# Patient Record
Sex: MALE | Race: WHITE | Employment: PART TIME | URBAN - METROPOLITAN AREA
[De-identification: names, ages, dates, MRNs, and addresses within clinical notes are randomized per-mention and may not be internally consistent; named-entity substitution may affect disease eponyms.]

---

## 2017-06-01 ENCOUNTER — OFFICE VISIT (OUTPATIENT)
Dept: INTERNAL MEDICINE CLINIC | Age: 62
End: 2017-06-01

## 2017-06-01 VITALS
TEMPERATURE: 98 F | BODY MASS INDEX: 28.63 KG/M2 | HEIGHT: 70 IN | WEIGHT: 200 LBS | SYSTOLIC BLOOD PRESSURE: 110 MMHG | DIASTOLIC BLOOD PRESSURE: 70 MMHG

## 2017-06-01 DIAGNOSIS — L98.9 SKIN ABNORMALITY: Primary | ICD-10-CM

## 2017-06-01 PROCEDURE — 99213 OFFICE O/P EST LOW 20 MIN: CPT | Performed by: NURSE PRACTITIONER

## 2017-06-01 RX ORDER — PREDNISONE 20 MG/1
20 TABLET ORAL DAILY
COMMUNITY
End: 2017-07-20

## 2017-06-01 RX ORDER — DOXYCYCLINE HYCLATE 100 MG
100 TABLET ORAL 2 TIMES DAILY
COMMUNITY
End: 2017-06-22

## 2017-06-01 ASSESSMENT — ENCOUNTER SYMPTOMS
EYES NEGATIVE: 1
CONSTIPATION: 0
BACK PAIN: 0
DIARRHEA: 0
COLOR CHANGE: 0
SHORTNESS OF BREATH: 0
BLOOD IN STOOL: 0
COUGH: 0

## 2017-06-05 ENCOUNTER — TELEPHONE (OUTPATIENT)
Dept: INTERNAL MEDICINE CLINIC | Age: 62
End: 2017-06-05

## 2017-06-21 ENCOUNTER — TELEPHONE (OUTPATIENT)
Dept: INTERNAL MEDICINE CLINIC | Age: 62
End: 2017-06-21

## 2017-06-21 ENCOUNTER — OFFICE VISIT (OUTPATIENT)
Dept: INTERNAL MEDICINE CLINIC | Age: 62
End: 2017-06-21

## 2017-06-21 ENCOUNTER — HOSPITAL ENCOUNTER (OUTPATIENT)
Dept: GENERAL RADIOLOGY | Age: 62
Discharge: HOME | End: 2017-06-21
Attending: INTERNAL MEDICINE | Admitting: INTERNAL MEDICINE

## 2017-06-21 VITALS
DIASTOLIC BLOOD PRESSURE: 86 MMHG | OXYGEN SATURATION: 96 % | BODY MASS INDEX: 28.12 KG/M2 | WEIGHT: 196 LBS | SYSTOLIC BLOOD PRESSURE: 136 MMHG | HEART RATE: 94 BPM

## 2017-06-21 DIAGNOSIS — E78.5 HYPERLIPIDEMIA, UNSPECIFIED HYPERLIPIDEMIA TYPE: ICD-10-CM

## 2017-06-21 DIAGNOSIS — R21 RASH: Primary | ICD-10-CM

## 2017-06-21 DIAGNOSIS — R21 SKIN RASH: ICD-10-CM

## 2017-06-21 DIAGNOSIS — Z13.9 SCREENING: ICD-10-CM

## 2017-06-21 DIAGNOSIS — H02.402 PTOSIS OF EYELID, LEFT: Primary | ICD-10-CM

## 2017-06-21 LAB
A/G RATIO: 1.4 (ref 1.1–2.2)
ALBUMIN SERPL-MCNC: 4.2 G/DL (ref 3.4–5)
ALP BLD-CCNC: 61 U/L (ref 40–129)
ALT SERPL-CCNC: 26 U/L (ref 10–40)
ANION GAP SERPL CALCULATED.3IONS-SCNC: 16 MMOL/L (ref 3–16)
AST SERPL-CCNC: 23 U/L (ref 15–37)
BASOPHILS ABSOLUTE: 0 K/UL (ref 0–0.2)
BASOPHILS RELATIVE PERCENT: 0.4 %
BILIRUB SERPL-MCNC: 0.5 MG/DL (ref 0–1)
BUN BLDV-MCNC: 14 MG/DL (ref 7–20)
CALCIUM SERPL-MCNC: 9.6 MG/DL (ref 8.3–10.6)
CHLORIDE BLD-SCNC: 100 MMOL/L (ref 99–110)
CHOLESTEROL, TOTAL: 212 MG/DL (ref 0–199)
CO2: 23 MMOL/L (ref 21–32)
CREAT SERPL-MCNC: 0.8 MG/DL (ref 0.8–1.3)
EOSINOPHILS ABSOLUTE: 0.1 K/UL (ref 0–0.6)
EOSINOPHILS RELATIVE PERCENT: 1.1 %
GFR AFRICAN AMERICAN: >60
GFR NON-AFRICAN AMERICAN: >60
GLOBULIN: 2.9 G/DL
GLUCOSE BLD-MCNC: 117 MG/DL (ref 70–99)
HCT VFR BLD CALC: 46.1 % (ref 40.5–52.5)
HDLC SERPL-MCNC: 59 MG/DL (ref 40–60)
HEMOGLOBIN: 15.1 G/DL (ref 13.5–17.5)
LDL CHOLESTEROL CALCULATED: 123 MG/DL
LYMPHOCYTES ABSOLUTE: 2.5 K/UL (ref 1–5.1)
LYMPHOCYTES RELATIVE PERCENT: 25.9 %
MCH RBC QN AUTO: 28.8 PG (ref 26–34)
MCHC RBC AUTO-ENTMCNC: 32.7 G/DL (ref 31–36)
MCV RBC AUTO: 87.9 FL (ref 80–100)
MONOCYTES ABSOLUTE: 0.6 K/UL (ref 0–1.3)
MONOCYTES RELATIVE PERCENT: 6.2 %
NEUTROPHILS ABSOLUTE: 6.3 K/UL (ref 1.7–7.7)
NEUTROPHILS RELATIVE PERCENT: 66.4 %
PDW BLD-RTO: 13.6 % (ref 12.4–15.4)
PLATELET # BLD: 239 K/UL (ref 135–450)
PMV BLD AUTO: 9.3 FL (ref 5–10.5)
POTASSIUM SERPL-SCNC: 4.4 MMOL/L (ref 3.5–5.1)
PROSTATE SPECIFIC ANTIGEN: 3.02 NG/ML (ref 0–4)
RBC # BLD: 5.24 M/UL (ref 4.2–5.9)
SEDIMENTATION RATE, ERYTHROCYTE: 12 MM/HR (ref 0–20)
SODIUM BLD-SCNC: 139 MMOL/L (ref 136–145)
TOTAL PROTEIN: 7.1 G/DL (ref 6.4–8.2)
TRIGL SERPL-MCNC: 151 MG/DL (ref 0–150)
VLDLC SERPL CALC-MCNC: 30 MG/DL
WBC # BLD: 9.5 K/UL (ref 4–11)

## 2017-06-21 PROCEDURE — 99215 OFFICE O/P EST HI 40 MIN: CPT | Performed by: INTERNAL MEDICINE

## 2017-06-21 RX ORDER — VALACYCLOVIR HYDROCHLORIDE 1 G/1
1000 TABLET, FILM COATED ORAL 3 TIMES DAILY
Qty: 21 TABLET | Refills: 0 | Status: SHIPPED | OUTPATIENT
Start: 2017-06-21 | End: 2017-07-20

## 2017-06-22 ENCOUNTER — INITIAL CONSULT (OUTPATIENT)
Dept: NEUROLOGY | Age: 62
End: 2017-06-22

## 2017-06-22 VITALS
DIASTOLIC BLOOD PRESSURE: 73 MMHG | BODY MASS INDEX: 28.49 KG/M2 | HEIGHT: 70 IN | HEART RATE: 71 BPM | OXYGEN SATURATION: 97 % | WEIGHT: 199 LBS | SYSTOLIC BLOOD PRESSURE: 117 MMHG

## 2017-06-22 DIAGNOSIS — G70.00 MG (MYASTHENIA GRAVIS) (HCC): ICD-10-CM

## 2017-06-22 DIAGNOSIS — H02.402 PTOSIS, LEFT: Primary | ICD-10-CM

## 2017-06-22 DIAGNOSIS — I77.74 DISSECTION, VERTEBRAL ARTERY (HCC): ICD-10-CM

## 2017-06-22 DIAGNOSIS — E78.5 DYSLIPIDEMIA: ICD-10-CM

## 2017-06-22 PROBLEM — H02.409 PTOSIS: Status: ACTIVE | Noted: 2017-06-22

## 2017-06-22 PROCEDURE — 99244 OFF/OP CNSLTJ NEW/EST MOD 40: CPT | Performed by: PSYCHIATRY & NEUROLOGY

## 2017-06-23 ENCOUNTER — HOSPITAL ENCOUNTER (OUTPATIENT)
Dept: GENERAL RADIOLOGY | Age: 62
Discharge: HOME | End: 2017-06-23
Attending: PSYCHIATRY & NEUROLOGY | Admitting: PSYCHIATRY & NEUROLOGY

## 2017-06-23 LAB
FOLATE: >20 NG/ML (ref 4.78–24.2)
LYME, EIA: 0.36 LIV (ref 0–1.2)
VITAMIN B-12: 514 PG/ML (ref 211–911)

## 2017-06-26 LAB
ANA INTERPRETATION: ABNORMAL
ANA TITER: ABNORMAL
ANTI-NUCLEAR ANTIBODY (ANA): POSITIVE

## 2017-06-27 LAB
ACETYLCHOLINE BINDING ANTIBODY: 0 NMOL/L (ref 0–0.4)
ACETYLCHOLINE BLOCKING AB: 14 % (ref 0–26)

## 2017-06-29 ENCOUNTER — HOSPITAL ENCOUNTER (OUTPATIENT)
Dept: CT IMAGING | Age: 62
Discharge: HOME | End: 2017-06-29
Attending: PSYCHIATRY & NEUROLOGY | Admitting: PSYCHIATRY & NEUROLOGY

## 2017-06-29 DIAGNOSIS — I77.74 DISSECTION, VERTEBRAL ARTERY (HCC): ICD-10-CM

## 2017-06-29 DIAGNOSIS — I77.74 DISSECTION OF VERTEBRAL ARTERY (HCC): ICD-10-CM

## 2017-07-06 ENCOUNTER — OFFICE VISIT (OUTPATIENT)
Dept: INTERNAL MEDICINE CLINIC | Age: 62
End: 2017-07-06

## 2017-07-06 VITALS
WEIGHT: 200 LBS | HEART RATE: 74 BPM | SYSTOLIC BLOOD PRESSURE: 128 MMHG | OXYGEN SATURATION: 98 % | DIASTOLIC BLOOD PRESSURE: 82 MMHG | BODY MASS INDEX: 28.63 KG/M2 | HEIGHT: 70 IN

## 2017-07-06 DIAGNOSIS — R21 RASH: ICD-10-CM

## 2017-07-06 DIAGNOSIS — H02.402 PTOSIS, LEFT: Primary | ICD-10-CM

## 2017-07-06 DIAGNOSIS — E78.5 HYPERLIPIDEMIA, UNSPECIFIED HYPERLIPIDEMIA TYPE: ICD-10-CM

## 2017-07-06 DIAGNOSIS — Z82.49 FAMILY HISTORY OF EARLY CAD: ICD-10-CM

## 2017-07-06 PROCEDURE — 99214 OFFICE O/P EST MOD 30 MIN: CPT | Performed by: INTERNAL MEDICINE

## 2017-07-06 RX ORDER — TRIAMCINOLONE ACETONIDE 1 MG/G
CREAM TOPICAL 2 TIMES DAILY
COMMUNITY
End: 2017-07-20

## 2017-07-20 ENCOUNTER — OFFICE VISIT (OUTPATIENT)
Dept: NEUROLOGY | Age: 62
End: 2017-07-20

## 2017-07-20 VITALS
HEIGHT: 70 IN | SYSTOLIC BLOOD PRESSURE: 111 MMHG | WEIGHT: 200 LBS | BODY MASS INDEX: 28.63 KG/M2 | OXYGEN SATURATION: 98 % | DIASTOLIC BLOOD PRESSURE: 75 MMHG | HEART RATE: 68 BPM

## 2017-07-20 DIAGNOSIS — R21 RASH: ICD-10-CM

## 2017-07-20 DIAGNOSIS — H02.402 PTOSIS, LEFT: Primary | ICD-10-CM

## 2017-07-20 DIAGNOSIS — E78.5 DYSLIPIDEMIA: ICD-10-CM

## 2017-07-20 PROCEDURE — 99213 OFFICE O/P EST LOW 20 MIN: CPT | Performed by: PSYCHIATRY & NEUROLOGY

## 2017-07-20 RX ORDER — CLOBETASOL PROPIONATE 0.5 MG/G
AEROSOL, FOAM TOPICAL
COMMUNITY
Start: 2017-07-14

## 2017-07-21 ENCOUNTER — OFFICE VISIT (OUTPATIENT)
Dept: INTERNAL MEDICINE CLINIC | Age: 62
End: 2017-07-21

## 2017-07-21 VITALS
SYSTOLIC BLOOD PRESSURE: 118 MMHG | OXYGEN SATURATION: 98 % | BODY MASS INDEX: 28.46 KG/M2 | WEIGHT: 198.8 LBS | TEMPERATURE: 98.2 F | HEART RATE: 62 BPM | DIASTOLIC BLOOD PRESSURE: 74 MMHG | HEIGHT: 70 IN

## 2017-07-21 DIAGNOSIS — R21 RASH: Primary | ICD-10-CM

## 2017-07-21 PROCEDURE — 99213 OFFICE O/P EST LOW 20 MIN: CPT | Performed by: FAMILY MEDICINE

## 2017-07-21 RX ORDER — HYDROXYZINE HYDROCHLORIDE 25 MG/1
25 TABLET, FILM COATED ORAL 3 TIMES DAILY PRN
Qty: 30 TABLET | Refills: 0 | Status: SHIPPED | OUTPATIENT
Start: 2017-07-21 | End: 2017-07-31

## 2017-07-21 RX ORDER — PREDNISONE 10 MG/1
TABLET ORAL
Qty: 30 TABLET | Refills: 0 | Status: SHIPPED | OUTPATIENT
Start: 2017-07-21 | End: 2017-07-31

## 2017-09-26 ENCOUNTER — OFFICE VISIT (OUTPATIENT)
Dept: INTERNAL MEDICINE CLINIC | Age: 62
End: 2017-09-26

## 2017-09-26 VITALS
BODY MASS INDEX: 28.98 KG/M2 | WEIGHT: 202 LBS | DIASTOLIC BLOOD PRESSURE: 70 MMHG | HEART RATE: 68 BPM | SYSTOLIC BLOOD PRESSURE: 128 MMHG | OXYGEN SATURATION: 98 %

## 2017-09-26 DIAGNOSIS — R21 RASH: Primary | ICD-10-CM

## 2017-09-26 PROCEDURE — 99213 OFFICE O/P EST LOW 20 MIN: CPT | Performed by: FAMILY MEDICINE

## 2017-09-26 NOTE — PATIENT INSTRUCTIONS
Try taking claritin daily to help with itching, can take additional benadryl at night if needed     Try taking tylenol 500 mg tablets - take 2 tablets every 6-8 hours

## 2017-09-26 NOTE — PROGRESS NOTES
[facility]          Chief Complaint   Patient presents with    Rash       HPI:     Has persistent issues with intermittent itchy red rash all over his body, including on his scalp. It is so uncomfortable it gets painful. Has been seen several times in this office and treated with oral steroids for this over the past few months. Sx's improve with steroids, but only temporarily. Has seen derm Dr Malika Gupta or Dr Kimberly Nelson at the [facility] office, and prescribed topical steroid shampoo and cream. Using them both, but sx's are flared up again. No wheezing or SOB. No new foods or exposures. He made an appt with a Tafhernandezy Kandi Dr Hoda Pascual, but unfortunately he missed his appt and they can't get him in for 2 more months. He came to ask for help getting in sooner somewhere        Current Outpatient Prescriptions on File Prior to Visit   Medication Sig Dispense Refill    clobetasol (OLUX) 0.05 % foam       econazole nitrate 1 % cream       pravastatin (PRAVACHOL) 80 MG tablet TAKE ONE TABLET BY MOUTH EVERY EVENING FOR HIGH CHOLESTEROL 30 tablet 9    multivitamin (THERAGRAN) per tablet Take 1 tablet by mouth daily. No current facility-administered medications on file prior to visit. Review of Systems   Constitutional: Negative for activity change, diaphoresis, fatigue and fever. HENT: Negative for congestion. Eyes: Negative for redness, itching and visual disturbance. Respiratory: Negative for shortness of breath and wheezing. Gastrointestinal: Negative for diarrhea, nausea and vomiting. Skin: Positive for rash (diffuse, recurrent, see HPI). Neurological: Negative for headaches. Psychiatric/Behavioral: The patient is nervous/anxious. Physical Exam   Constitutional: He is oriented to person, place, and time. He appears well-developed and well-nourished. No distress. HENT:   Head: Normocephalic and atraumatic.    Mouth/Throat: Oropharynx is clear and moist.   Eyes:

## 2017-09-26 NOTE — MR AVS SNAPSHOT
After Visit Summary             [facility]   2017 4:20 PM   Office Visit    Description:  Male : 1955   Provider:  Jamilah Ray MD   Department:  [facility]              Your Follow-Up and Future Appointments         Below is a list of your follow-up and future appointments. This may not be a complete list as you may have made appointments directly with providers that we are not aware of or your providers may have made some for you. Please call your providers to confirm appointments. It is important to keep your appointments. Please bring your current insurance card, photo ID, co-pay, and all medication bottles to your appointment. If self-pay, payment is expected at the time of service. Information from Your Visit        Department     Name Address Phone Fax    Unbound [facility]) [address] [facility] [address] [facility]  [address] [place] [address] Street  [address] [place] [address] [address] [place] [address] [phone]      You Were Seen for:         Comments    Rash   [[id number]]         Vital Signs     Blood Pressure Pulse Weight Oxygen Saturation Body Mass Index Smoking Status    128/70 (Site: Right Arm, Position: Sitting, Cuff Size: Large Adult) 68 202 lb (91.6 kg) 98% 28.98 kg/m2 Never Smoker      Additional Information about your Body Mass Index (BMI)           Your BMI as listed above is considered overweight (25.0-29.9). BMI is an estimate of body fat, calculated from your height and weight. The higher your BMI, the greater your risk of heart disease, high blood pressure, type 2 diabetes, stroke, gallstones, arthritis, sleep apnea, and certain cancers. BMI is not perfect. It may overestimate body fat in athletes and people who are more muscular. If your body fat is high you can improve your BMI by decreasing your calorie intake and becoming more physically active.      Learn more at: [employer].co.uk          Instructions Try taking claritin daily to help with itching, can take additional benadryl at night if needed     Try taking tylenol 500 mg tablets - take 2 tablets every 6-8 hours            Medications and Orders      Your Current Medications Are              clobetasol (OLUX) 0.05 % foam     econazole nitrate 1 % cream     pravastatin (PRAVACHOL) 80 MG tablet TAKE ONE TABLET BY MOUTH EVERY EVENING FOR HIGH CHOLESTEROL    multivitamin (THERAGRAN) per tablet Take 1 tablet by mouth daily. Allergies           No Known Allergies         Additional Information        Basic Information     Date Of Birth Sex Race Ethnicity Preferred Language    1955 Male White Non-/Non  English      Problem List as of 9/26/2017  Date Reviewed: 7/21/2017                Hyperlipidemia    Family history of early CAD    ED (erectile dysfunction)    Kidney stone    Adenomatous colon polyp    Shingles    Agitation    Rash    Ptosis    MG (myasthenia gravis) ([facility] [address]    Dissection, vertebral artery (HCC)    Dyslipidemia    Diarrhea    Lesion of ulnar nerve    Pain in limb    Lateral epicondylitis of left elbow      Immunizations as of 9/26/2017     Name Date    Influenza Virus Vaccine 1/26/2016, 12/11/2014, 11/15/2010, 1/25/2010    Influenza, Quadrivalent, 3 Years and above, IM 11/4/2016    Tdap (Boostrix, Adacel) 4/6/2009      Preventive Care        Date Due    Hepatitis C screening is recommended for all adults regardless of risk factors born between [facility] at least once (lifetime) who have never been tested. 1955    HIV screening is recommended for all people regardless of risk factors  aged 15-65 years at least once (lifetime) who have never been HIV tested.  1/19/1972    Zoster Vaccine 1/19/2017    Yearly Flu Vaccine (1) 9/1/2017    Tetanus Combination Vaccine (2 - Td) 4/6/2019    Diabetes Screening 9/22/2019    Cholesterol Screening 6/21/2022    Colonoscopy 1/12/2025            [facility] Signup [employer] allows you to send messages to your doctor, view your test results, renew your prescriptions, schedule appointments, view visit notes, and more. How Do I Sign Up? 1. In your Internet browser, go to [email or web address]  2. Click on the Sign Up Now link in the Sign In box. You will see the New Member Sign Up page. 3. Enter your P[employer] Access Code exactly as it appears below. You will not need to use this code after youve completed the sign-up process. If you do not sign up before the expiration date, you must request a new code. P[employer]Pal Access Code: [email or web address]  Expires: 11/20/2017 10:06 AM    4. Enter your Social Security Number ([id number]) and Date of Birth (mm/dd/yyyy) as indicated and click Submit. You will be taken to the next sign-up page. 5. Create a PicksPal ID. This will be your P[employer]Pal login ID and cannot be changed, so think of one that is secure and easy to remember. 6. Create a P[employer]Pal password. You can change your password at any time. 7. Enter your Password Reset Question and Answer. This can be used at a later time if you forget your password. 8. Enter your e-mail address. You will receive e-mail notification when new information is available in [address] 9. Click Sign Up. You can now view your medical record. Additional Information  If you have questions, please contact the physician practice where you receive care. Remember, P[employer]Pal is NOT to be used for urgent needs. For medical emergencies, dial 911. For questions regarding your [employer] account call [phone]. If you have a clinical question, please call your doctor's office.

## 2017-10-01 ASSESSMENT — ENCOUNTER SYMPTOMS
DIARRHEA: 0
VOMITING: 0
WHEEZING: 0
SHORTNESS OF BREATH: 0
NAUSEA: 0
EYE ITCHING: 0
EYE REDNESS: 0

## 2017-12-29 ENCOUNTER — NURSE ONLY (OUTPATIENT)
Dept: INTERNAL MEDICINE CLINIC | Age: 62
End: 2017-12-29

## 2017-12-29 DIAGNOSIS — Z23 NEED FOR IMMUNIZATION AGAINST INFLUENZA: Primary | ICD-10-CM

## 2017-12-29 PROCEDURE — 90688 IIV4 VACCINE SPLT 0.5 ML IM: CPT | Performed by: INTERNAL MEDICINE

## 2017-12-29 PROCEDURE — 90471 IMMUNIZATION ADMIN: CPT | Performed by: INTERNAL MEDICINE

## 2017-12-29 NOTE — PROGRESS NOTES
Vaccine Information Sheet, \"Influenza - Inactivated\"  given to Hugo Wong, or parent/legal guardian of  Hugo Wong and verbalized understanding. Patient responses:    Have you ever had a reaction to a flu vaccine? No  Are you able to eat eggs without adverse effects? Yes  Do you have any current illness? No  Have you ever had Guillian Littlerock Syndrome? No    Flu vaccine given per order. Please see immunization tab.

## 2018-02-06 ENCOUNTER — OFFICE VISIT (OUTPATIENT)
Dept: INTERNAL MEDICINE CLINIC | Age: 63
End: 2018-02-06

## 2018-02-06 VITALS
DIASTOLIC BLOOD PRESSURE: 86 MMHG | WEIGHT: 212 LBS | SYSTOLIC BLOOD PRESSURE: 142 MMHG | OXYGEN SATURATION: 98 % | HEART RATE: 85 BPM | BODY MASS INDEX: 30.42 KG/M2

## 2018-02-06 DIAGNOSIS — S39.012A STRAIN OF LUMBAR REGION, INITIAL ENCOUNTER: Primary | ICD-10-CM

## 2018-02-06 PROCEDURE — 99213 OFFICE O/P EST LOW 20 MIN: CPT | Performed by: FAMILY MEDICINE

## 2018-02-06 RX ORDER — METHYLPREDNISOLONE 4 MG/1
TABLET ORAL
COMMUNITY
Start: 2018-02-04 | End: 2018-08-09 | Stop reason: ALTCHOICE

## 2018-02-06 RX ORDER — NAPROXEN 500 MG/1
500 TABLET ORAL 2 TIMES DAILY WITH MEALS
Qty: 60 TABLET | Refills: 0 | Status: SHIPPED | OUTPATIENT
Start: 2018-02-06 | End: 2020-01-13 | Stop reason: ALTCHOICE

## 2018-02-06 RX ORDER — TRAMADOL HYDROCHLORIDE 50 MG/1
TABLET ORAL
COMMUNITY
Start: 2018-02-04 | End: 2018-08-09 | Stop reason: ALTCHOICE

## 2018-02-06 RX ORDER — CYCLOBENZAPRINE HCL 10 MG
TABLET ORAL
COMMUNITY
Start: 2018-02-04 | End: 2019-10-02 | Stop reason: CLARIF

## 2018-02-06 RX ORDER — HYDROCODONE BITARTRATE AND ACETAMINOPHEN 5; 325 MG/1; MG/1
1 TABLET ORAL
Qty: 30 TABLET | Refills: 0 | Status: SHIPPED | OUTPATIENT
Start: 2018-02-06 | End: 2018-02-11 | Stop reason: SDUPTHER

## 2018-02-11 ENCOUNTER — TELEPHONE (OUTPATIENT)
Dept: INTERNAL MEDICINE CLINIC | Age: 63
End: 2018-02-11

## 2018-02-13 ENCOUNTER — OFFICE VISIT (OUTPATIENT)
Dept: INTERNAL MEDICINE CLINIC | Age: 63
End: 2018-02-13

## 2018-02-13 VITALS
HEIGHT: 70 IN | TEMPERATURE: 97.8 F | WEIGHT: 206 LBS | SYSTOLIC BLOOD PRESSURE: 134 MMHG | DIASTOLIC BLOOD PRESSURE: 70 MMHG | BODY MASS INDEX: 29.49 KG/M2

## 2018-02-13 DIAGNOSIS — M54.50 ACUTE RIGHT-SIDED LOW BACK PAIN WITHOUT SCIATICA: Primary | ICD-10-CM

## 2018-02-13 DIAGNOSIS — N20.0 KIDNEY STONE: ICD-10-CM

## 2018-02-13 PROCEDURE — 99214 OFFICE O/P EST MOD 30 MIN: CPT | Performed by: NURSE PRACTITIONER

## 2018-02-13 ASSESSMENT — ENCOUNTER SYMPTOMS
BLOOD IN STOOL: 0
EYES NEGATIVE: 1
DIARRHEA: 0
SHORTNESS OF BREATH: 0
BACK PAIN: 0
COUGH: 0
COLOR CHANGE: 0
CONSTIPATION: 0

## 2018-06-22 ENCOUNTER — TELEPHONE (OUTPATIENT)
Dept: INTERNAL MEDICINE CLINIC | Age: 63
End: 2018-06-22

## 2018-06-24 DIAGNOSIS — Z00.00 ANNUAL PHYSICAL EXAM: Primary | ICD-10-CM

## 2018-07-19 ENCOUNTER — HOSPITAL ENCOUNTER (OUTPATIENT)
Age: 63
Discharge: HOME | End: 2018-07-19
Payer: COMMERCIAL

## 2018-07-19 DIAGNOSIS — Z00.00 ANNUAL PHYSICAL EXAM: ICD-10-CM

## 2018-07-19 LAB
A/G RATIO: 1.4 (ref 1.1–2.2)
ALBUMIN SERPL-MCNC: 4.3 G/DL (ref 3.4–5)
ALP BLD-CCNC: 59 U/L (ref 40–129)
ALT SERPL-CCNC: 29 U/L (ref 10–40)
ANION GAP SERPL CALCULATED.3IONS-SCNC: 11 MMOL/L (ref 3–16)
AST SERPL-CCNC: 25 U/L (ref 15–37)
BILIRUB SERPL-MCNC: 0.4 MG/DL (ref 0–1)
BUN BLDV-MCNC: 13 MG/DL (ref 7–20)
CALCIUM SERPL-MCNC: 9.5 MG/DL (ref 8.3–10.6)
CHLORIDE BLD-SCNC: 104 MMOL/L (ref 99–110)
CHOLESTEROL, TOTAL: 196 MG/DL (ref 0–199)
CO2: 28 MMOL/L (ref 21–32)
CREAT SERPL-MCNC: 0.8 MG/DL (ref 0.8–1.3)
GFR AFRICAN AMERICAN: >60
GFR NON-AFRICAN AMERICAN: >60
GLOBULIN: 3 G/DL
GLUCOSE BLD-MCNC: 100 MG/DL (ref 70–99)
HCT VFR BLD CALC: 45.6 % (ref 40.5–52.5)
HDLC SERPL-MCNC: 58 MG/DL (ref 40–60)
HEMOGLOBIN: 15.8 G/DL (ref 13.5–17.5)
LDL CHOLESTEROL CALCULATED: 106 MG/DL
MCH RBC QN AUTO: 30 PG (ref 26–34)
MCHC RBC AUTO-ENTMCNC: 34.6 G/DL (ref 31–36)
MCV RBC AUTO: 86.9 FL (ref 80–100)
PDW BLD-RTO: 14 % (ref 12.4–15.4)
PLATELET # BLD: 237 K/UL (ref 135–450)
PMV BLD AUTO: 9.2 FL (ref 5–10.5)
POTASSIUM SERPL-SCNC: 4.7 MMOL/L (ref 3.5–5.1)
PROSTATE SPECIFIC ANTIGEN: 4.15 NG/ML (ref 0–4)
RBC # BLD: 5.25 M/UL (ref 4.2–5.9)
SODIUM BLD-SCNC: 143 MMOL/L (ref 136–145)
TOTAL PROTEIN: 7.3 G/DL (ref 6.4–8.2)
TRIGL SERPL-MCNC: 162 MG/DL (ref 0–150)
VLDLC SERPL CALC-MCNC: 32 MG/DL
WBC # BLD: 5.6 K/UL (ref 4–11)

## 2018-07-19 PROCEDURE — 36415 COLL VENOUS BLD VENIPUNCTURE: CPT

## 2018-07-19 PROCEDURE — 85027 COMPLETE CBC AUTOMATED: CPT

## 2018-07-19 PROCEDURE — 80061 LIPID PANEL: CPT

## 2018-07-19 PROCEDURE — G0103 PSA SCREENING: HCPCS

## 2018-07-19 PROCEDURE — 80053 COMPREHEN METABOLIC PANEL: CPT

## 2018-08-09 ENCOUNTER — OFFICE VISIT (OUTPATIENT)
Dept: INTERNAL MEDICINE CLINIC | Age: 63
End: 2018-08-09

## 2018-08-09 VITALS
BODY MASS INDEX: 28.55 KG/M2 | OXYGEN SATURATION: 96 % | SYSTOLIC BLOOD PRESSURE: 124 MMHG | DIASTOLIC BLOOD PRESSURE: 78 MMHG | WEIGHT: 199 LBS | HEART RATE: 80 BPM

## 2018-08-09 DIAGNOSIS — M19.90 ARTHRITIS: ICD-10-CM

## 2018-08-09 DIAGNOSIS — N20.0 KIDNEY STONE: ICD-10-CM

## 2018-08-09 DIAGNOSIS — Z82.49 FAMILY HISTORY OF EARLY CAD: ICD-10-CM

## 2018-08-09 DIAGNOSIS — G70.00 MG (MYASTHENIA GRAVIS) (HCC): ICD-10-CM

## 2018-08-09 DIAGNOSIS — R06.00 DYSPNEA, UNSPECIFIED TYPE: ICD-10-CM

## 2018-08-09 DIAGNOSIS — Z00.00 ANNUAL PHYSICAL EXAM: Primary | ICD-10-CM

## 2018-08-09 DIAGNOSIS — E78.5 HYPERLIPIDEMIA, UNSPECIFIED HYPERLIPIDEMIA TYPE: ICD-10-CM

## 2018-08-09 LAB
BILIRUBIN, POC: NORMAL
BLOOD URINE, POC: NORMAL
CLARITY, POC: CLEAR
COLOR, POC: YELLOW
GLUCOSE URINE, POC: NORMAL
KETONES, POC: NORMAL
LEUKOCYTE EST, POC: NORMAL
NITRITE, POC: NORMAL
PH, POC: 5
PROTEIN, POC: NORMAL
SPECIFIC GRAVITY, POC: 1.03
UROBILINOGEN, POC: 0.2

## 2018-08-09 PROCEDURE — 99396 PREV VISIT EST AGE 40-64: CPT | Performed by: INTERNAL MEDICINE

## 2018-08-09 PROCEDURE — 81002 URINALYSIS NONAUTO W/O SCOPE: CPT | Performed by: INTERNAL MEDICINE

## 2018-08-09 ASSESSMENT — PATIENT HEALTH QUESTIONNAIRE - PHQ9
SUM OF ALL RESPONSES TO PHQ9 QUESTIONS 1 & 2: 0
SUM OF ALL RESPONSES TO PHQ QUESTIONS 1-9: 0
SUM OF ALL RESPONSES TO PHQ QUESTIONS 1-9: 0
2. FEELING DOWN, DEPRESSED OR HOPELESS: 0
1. LITTLE INTEREST OR PLEASURE IN DOING THINGS: 0

## 2018-08-09 NOTE — PROGRESS NOTES
History and Physical      [facility]  YOB: 1955    Date of Service:  8/9/2018    Chief Complaint:   Augie Swift is a 66 y.o. male who presents for complete physical examination. HPI: Patient here for wellness exam.  2/11/2018 seen in emergency room for right flank pain. CT scan abdomen/pelvis bilateral nonobstructing nephrolithiasis with 2 lesions question of benign and hyperdense cyst suggested pre-and post contrast renal mass CT study. Urology: 3/8/2018 follow-up for kidney stones. Dr. Lawrence Ruelas.     Wt Readings from Last 3 Encounters:   08/09/18 199 lb (90.3 kg)   02/13/18 206 lb (93.4 kg)   02/11/18 200 lb (90.7 kg)     BP Readings from Last 3 Encounters:   08/09/18 114/80   02/13/18 134/70   02/11/18 (!) 125/93       Patient Active Problem List   Diagnosis    Shingles    Agitation    ED (erectile dysfunction)    Kidney stone    Hyperlipidemia    Adenomatous colon polyp    Family history of early CAD    Lateral epicondylitis of left elbow    Pain in limb    Lesion of ulnar nerve    Diarrhea    Ptosis    MG (myasthenia gravis) ([address]    Dissection, vertebral artery ([address]    Dyslipidemia    Rash       Preventive Care:  Health Maintenance   Topic Date Due    Hepatitis C screen  1955    HIV screen  01/19/1972    Shingles Vaccine (1 of 2 - 2 Dose Series) 01/19/2007    Flu vaccine (1) 09/01/2018    DTaP/Tdap/Td vaccine (2 - Td) 04/06/2019    Lipid screen  07/19/2023    Colon cancer screen colonoscopy  01/12/2025      Self-testicular exams:   Sexual activity:  y  Last eye exam:  remote  Exercise: y  Seatbelt use:  y  Lipid panel:    Lab Results   Component Value Date    CHOL 196 07/19/2018    TRIG 162 (H) 07/19/2018    HDL 58 07/19/2018    LDLCALC 106 (H) 07/19/2018    LDLDIRECT 118 (H) 12/06/2011       Living will: n    Immunization History   Administered Date(s) Administered    Influenza Virus Vaccine 01/25/2010, 11/15/2010, 12/11/2014, 01/26/2016    Influenza, Quadv, 3 Years and older, IM 11/04/2016, 12/29/2017    Tdap (Boostrix, Adacel) 04/06/2009       Allergies   Allergen Reactions    Penicillins Nausea And Vomiting     Outpatient Prescriptions Marked as Taking for the 8/9/18 encounter (Office Visit) with Rosi Gao MD   Medication Sig Dispense Refill    pravastatin (PRAVACHOL) 80 MG tablet TAKE ONE TABLET BY MOUTH EVERY EVENING FOR HIGH CHOLESTEROL 30 tablet 9    cyclobenzaprine (FLEXERIL) 10 MG tablet       naproxen (NAPROXEN) 500 MG EC tablet Take 1 tablet by mouth 2 times daily (with meals) 60 tablet 0    clobetasol (OLUX) 0.05 % foam       econazole nitrate 1 % cream       multivitamin (THERAGRAN) per tablet Take 1 tablet by mouth daily. Past Medical History:   Diagnosis Date    Adenomatous colon polyp 04/17/09    re do 2014    Agitation     Erectile dysfunction     Family history of early CAD 04/2010    Coronary Ca+ Score = \" 10\"    Hx of blood clots     calf post op     Hyperlipidemia     Kidney stone 03/2005    Lesion of ulnar nerve 4/2/2015    PONV (postoperative nausea and vomiting)     Shingles      Past Surgical History:   Procedure Laterality Date    ACHILLES TENDON SURGERY      rupture/repair    COLONOSCOPY  04/07/09    Tubular Adenoma    COLONOSCOPY  1/12/15    ELBOW SURGERY Right     EXCISION OF AURAL MASS      KIDNEY STONE SURGERY      KNEE ARTHROSCOPY      rt    TENOLYSIS      VASECTOMY      VASECTOMY  1992     Family History   Problem Relation Age of Onset    Cancer Father         Stomach CA    Heart Disease Father 54        MI x 2     Social History     Social History    Marital status:      Spouse name: N/A    Number of children: N/A    Years of education: N/A     Occupational History    Not on file.      Social History Main Topics    Smoking status: Never Smoker    Smokeless tobacco: Current User    Alcohol use 3.6 oz/week     6 Cans of beer per week      Comment: 10 - 15  BEERS A

## 2018-08-15 ENCOUNTER — APPOINTMENT (OUTPATIENT)
Dept: CT IMAGING | Age: 63
End: 2018-08-15
Payer: COMMERCIAL

## 2018-08-15 ENCOUNTER — HOSPITAL ENCOUNTER (EMERGENCY)
Age: 63
Discharge: HOME | End: 2018-08-15
Attending: EMERGENCY MEDICINE
Payer: COMMERCIAL

## 2018-08-15 VITALS
DIASTOLIC BLOOD PRESSURE: 75 MMHG | SYSTOLIC BLOOD PRESSURE: 125 MMHG | HEIGHT: 70 IN | OXYGEN SATURATION: 97 % | RESPIRATION RATE: 20 BRPM | HEART RATE: 66 BPM | WEIGHT: 200 LBS | BODY MASS INDEX: 28.63 KG/M2

## 2018-08-15 DIAGNOSIS — N20.1 LEFT URETERAL CALCULUS: Primary | ICD-10-CM

## 2018-08-15 DIAGNOSIS — R10.9 ACUTE LEFT FLANK PAIN: ICD-10-CM

## 2018-08-15 DIAGNOSIS — R73.9 HYPERGLYCEMIA: ICD-10-CM

## 2018-08-15 LAB
A/G RATIO: 1.6 (ref 1.1–2.2)
ALBUMIN SERPL-MCNC: 4 G/DL (ref 3.4–5)
ALP BLD-CCNC: 53 U/L (ref 40–129)
ALT SERPL-CCNC: 30 U/L (ref 10–40)
ANION GAP SERPL CALCULATED.3IONS-SCNC: 11 MMOL/L (ref 3–16)
AST SERPL-CCNC: 30 U/L (ref 15–37)
BACTERIA: ABNORMAL /HPF
BASOPHILS ABSOLUTE: 0 K/UL (ref 0–0.2)
BASOPHILS RELATIVE PERCENT: 0.7 %
BILIRUB SERPL-MCNC: 0.4 MG/DL (ref 0–1)
BILIRUBIN URINE: NEGATIVE
BLOOD, URINE: ABNORMAL
BUN BLDV-MCNC: 16 MG/DL (ref 7–20)
CALCIUM SERPL-MCNC: 8.9 MG/DL (ref 8.3–10.6)
CHLORIDE BLD-SCNC: 102 MMOL/L (ref 99–110)
CLARITY: CLEAR
CO2: 28 MMOL/L (ref 21–32)
COLOR: YELLOW
CREAT SERPL-MCNC: 0.9 MG/DL (ref 0.8–1.3)
EOSINOPHILS ABSOLUTE: 0.2 K/UL (ref 0–0.6)
EOSINOPHILS RELATIVE PERCENT: 3.2 %
EPITHELIAL CELLS, UA: ABNORMAL /HPF
GFR AFRICAN AMERICAN: >60
GFR NON-AFRICAN AMERICAN: >60
GLOBULIN: 2.5 G/DL
GLUCOSE BLD-MCNC: 191 MG/DL (ref 70–99)
GLUCOSE URINE: NEGATIVE MG/DL
HCT VFR BLD CALC: 43.7 % (ref 40.5–52.5)
HEMOGLOBIN: 14.7 G/DL (ref 13.5–17.5)
KETONES, URINE: NEGATIVE MG/DL
LEUKOCYTE ESTERASE, URINE: NEGATIVE
LYMPHOCYTES ABSOLUTE: 3.2 K/UL (ref 1–5.1)
LYMPHOCYTES RELATIVE PERCENT: 48.2 %
MCH RBC QN AUTO: 29.2 PG (ref 26–34)
MCHC RBC AUTO-ENTMCNC: 33.6 G/DL (ref 31–36)
MCV RBC AUTO: 87 FL (ref 80–100)
MICROSCOPIC EXAMINATION: YES
MONOCYTES ABSOLUTE: 0.4 K/UL (ref 0–1.3)
MONOCYTES RELATIVE PERCENT: 5.9 %
MUCUS: ABNORMAL /LPF
NEUTROPHILS ABSOLUTE: 2.8 K/UL (ref 1.7–7.7)
NEUTROPHILS RELATIVE PERCENT: 42 %
NITRITE, URINE: NEGATIVE
PDW BLD-RTO: 14.1 % (ref 12.4–15.4)
PH UA: 6
PLATELET # BLD: 255 K/UL (ref 135–450)
PMV BLD AUTO: 7.6 FL (ref 5–10.5)
POTASSIUM REFLEX MAGNESIUM: 3.7 MMOL/L (ref 3.5–5.1)
PROTEIN UA: NEGATIVE MG/DL
RBC # BLD: 5.03 M/UL (ref 4.2–5.9)
RBC UA: ABNORMAL /HPF (ref 0–2)
SODIUM BLD-SCNC: 141 MMOL/L (ref 136–145)
SPECIFIC GRAVITY UA: >=1.03
TOTAL PROTEIN: 6.5 G/DL (ref 6.4–8.2)
URINE REFLEX TO CULTURE: ABNORMAL
URINE TYPE: ABNORMAL
UROBILINOGEN, URINE: 0.2 E.U./DL
WBC # BLD: 6.7 K/UL (ref 4–11)
WBC UA: ABNORMAL /HPF (ref 0–5)

## 2018-08-15 PROCEDURE — 96361 HYDRATE IV INFUSION ADD-ON: CPT

## 2018-08-15 PROCEDURE — 6360000002 HC RX W HCPCS

## 2018-08-15 PROCEDURE — 96374 THER/PROPH/DIAG INJ IV PUSH: CPT

## 2018-08-15 PROCEDURE — 6360000002 HC RX W HCPCS: Performed by: EMERGENCY MEDICINE

## 2018-08-15 PROCEDURE — 96375 TX/PRO/DX INJ NEW DRUG ADDON: CPT

## 2018-08-15 PROCEDURE — 99284 EMERGENCY DEPT VISIT MOD MDM: CPT

## 2018-08-15 PROCEDURE — 85025 COMPLETE CBC W/AUTO DIFF WBC: CPT

## 2018-08-15 PROCEDURE — 74176 CT ABD & PELVIS W/O CONTRAST: CPT

## 2018-08-15 PROCEDURE — 2580000003 HC RX 258: Performed by: EMERGENCY MEDICINE

## 2018-08-15 PROCEDURE — 6370000000 HC RX 637 (ALT 250 FOR IP): Performed by: EMERGENCY MEDICINE

## 2018-08-15 PROCEDURE — 80053 COMPREHEN METABOLIC PANEL: CPT

## 2018-08-15 PROCEDURE — 81001 URINALYSIS AUTO W/SCOPE: CPT

## 2018-08-15 RX ORDER — ONDANSETRON 4 MG/1
4 TABLET, ORALLY DISINTEGRATING ORAL EVERY 8 HOURS PRN
Qty: 20 TABLET | Refills: 0 | Status: SHIPPED | OUTPATIENT
Start: 2018-08-15 | End: 2019-01-07 | Stop reason: ALTCHOICE

## 2018-08-15 RX ORDER — KETOROLAC TROMETHAMINE 30 MG/ML
30 INJECTION, SOLUTION INTRAMUSCULAR; INTRAVENOUS ONCE
Status: COMPLETED | OUTPATIENT
Start: 2018-08-15 | End: 2018-08-15

## 2018-08-15 RX ORDER — HYDROCODONE BITARTRATE AND ACETAMINOPHEN 5; 325 MG/1; MG/1
1 TABLET ORAL EVERY 8 HOURS PRN
Qty: 5 TABLET | Refills: 0 | Status: SHIPPED | OUTPATIENT
Start: 2018-08-15 | End: 2018-08-17

## 2018-08-15 RX ORDER — TAMSULOSIN HYDROCHLORIDE 0.4 MG/1
0.4 CAPSULE ORAL ONCE
Status: COMPLETED | OUTPATIENT
Start: 2018-08-15 | End: 2018-08-15

## 2018-08-15 RX ORDER — ONDANSETRON 2 MG/ML
INJECTION INTRAMUSCULAR; INTRAVENOUS
Status: COMPLETED
Start: 2018-08-15 | End: 2018-08-15

## 2018-08-15 RX ORDER — ONDANSETRON 2 MG/ML
4 INJECTION INTRAMUSCULAR; INTRAVENOUS ONCE
Status: COMPLETED | OUTPATIENT
Start: 2018-08-15 | End: 2018-08-15

## 2018-08-15 RX ORDER — 0.9 % SODIUM CHLORIDE 0.9 %
1000 INTRAVENOUS SOLUTION INTRAVENOUS ONCE
Status: COMPLETED | OUTPATIENT
Start: 2018-08-15 | End: 2018-08-15

## 2018-08-15 RX ADMIN — Medication 1 MG: at 05:43

## 2018-08-15 RX ADMIN — ONDANSETRON 4 MG: 2 INJECTION INTRAMUSCULAR; INTRAVENOUS at 05:43

## 2018-08-15 RX ADMIN — HYDROMORPHONE HYDROCHLORIDE 1 MG: 1 INJECTION, SOLUTION INTRAMUSCULAR; INTRAVENOUS; SUBCUTANEOUS at 05:43

## 2018-08-15 RX ADMIN — KETOROLAC TROMETHAMINE 30 MG: 30 INJECTION, SOLUTION INTRAMUSCULAR at 06:44

## 2018-08-15 RX ADMIN — TAMSULOSIN HYDROCHLORIDE 0.4 MG: 0.4 CAPSULE ORAL at 06:44

## 2018-08-15 RX ADMIN — SODIUM CHLORIDE 1000 ML: 9 INJECTION, SOLUTION INTRAVENOUS at 06:44

## 2018-08-15 ASSESSMENT — PAIN DESCRIPTION - PAIN TYPE: TYPE: ACUTE PAIN

## 2018-08-15 ASSESSMENT — ENCOUNTER SYMPTOMS
CONSTIPATION: 0
ABDOMINAL PAIN: 1
COLOR CHANGE: 0
SHORTNESS OF BREATH: 0
DIARRHEA: 0
NAUSEA: 1
BACK PAIN: 0
VOMITING: 1

## 2018-08-15 ASSESSMENT — PAIN DESCRIPTION - PROGRESSION: CLINICAL_PROGRESSION: NOT CHANGED

## 2018-08-15 ASSESSMENT — PAIN DESCRIPTION - DESCRIPTORS: DESCRIPTORS: SHARP;SHOOTING;ACHING

## 2018-08-15 ASSESSMENT — PAIN DESCRIPTION - DIRECTION: RADIATING_TOWARDS: BACK

## 2018-08-15 ASSESSMENT — PAIN DESCRIPTION - FREQUENCY: FREQUENCY: CONTINUOUS

## 2018-08-15 ASSESSMENT — PAIN SCALES - GENERAL: PAINLEVEL_OUTOF10: 10

## 2018-08-15 ASSESSMENT — PAIN DESCRIPTION - ONSET: ONSET: AWAKENED FROM SLEEP

## 2018-08-15 ASSESSMENT — PAIN DESCRIPTION - ORIENTATION: ORIENTATION: LEFT

## 2018-08-15 NOTE — ED PROVIDER NOTES
[facility]  ED  eMERGENCY dEPARTMENT eNCOUnter        Pt Name: Avril Mendoza  MRN: [id number]  Romgfdon 1955  Date of evaluation: 8/15/2018  Provider: Sakina Dhaliwal MD  PCP: Erick Martinez MD      CHIEF COMPLAINT       Chief Complaint   Patient presents with    Groin Swelling     Pt ambulatory to triuage for report of intense left testicle pain that woke him from sleep. Pain is now radiating towards left flank. Hx kidney stones. HISTORY OF PRESENT ILLNESS   (Location/Symptom, Timing/Onset, Context/Setting, Quality, Duration, Modifying Factors, Severity)  Note limiting factors. Avril Mendoza is a 66 y.o. male presenting today due to concern for left flank pain radiating to his left testicle that awoke him at 4:30 AM this morning with 2 episodes of vomiting. Denies any chest pain or shortness of breath. No fever. No hematuria or dysuria. He does have a history of kidney stones but states this feels slightly worse than normal.  Denies taking any medications normally. He was feeling fine whenever he went to bed last night. The pain has been constant but waxing and waning since this morning. REVIEW OF SYSTEMS    (2-9 systems for level 4, 10 or more for level 5)     Review of Systems   Constitutional: Negative for chills and fever. HENT: Negative for congestion. Respiratory: Negative for shortness of breath. Cardiovascular: Negative for chest pain. Gastrointestinal: Positive for abdominal pain, nausea and vomiting. Negative for constipation and diarrhea. Genitourinary: Positive for flank pain (left) and testicular pain (left). Negative for difficulty urinating, discharge, dysuria and penile pain. Musculoskeletal: Negative for back pain (left flank only) and neck pain. Skin: Negative for color change. Neurological: Negative for headaches. Psychiatric/Behavioral: Negative for confusion. Positives and Pertinent negatives as per HPI. PAST MEDICAL HISTORY     Past Medical History:   Diagnosis Date    Adenomatous colon polyp 04/17/09    re do 2014    Agitation     Erectile dysfunction     Family history of early CAD 04/2010    Coronary Ca+ Score = \" 10\"    Hx of blood clots     calf post op     Hyperlipidemia     Kidney stone 03/2005    Lesion of ulnar nerve 4/2/2015    PONV (postoperative nausea and vomiting)     Shingles          SURGICAL HISTORY       Past Surgical History:   Procedure Laterality Date    ACHILLES TENDON SURGERY      rupture/repair    COLONOSCOPY  04/07/09    Tubular Adenoma    COLONOSCOPY  1/12/15    ELBOW SURGERY Right     EXCISION OF AURAL MASS      KIDNEY STONE SURGERY      KNEE ARTHROSCOPY      rt    TENOLYSIS      VASECTOMY      VASECTOMY  1992         CURRENT MEDICATIONS       Previous Medications    CLOBETASOL (OLUX) 0.05 % FOAM        CYCLOBENZAPRINE (FLEXERIL) 10 MG TABLET        ECONAZOLE NITRATE 1 % CREAM        MULTIVITAMIN (THERAGRAN) PER TABLET    Take 1 tablet by mouth daily.       NAPROXEN (NAPROXEN) 500 MG EC TABLET    Take 1 tablet by mouth 2 times daily (with meals)    PRAVASTATIN (PRAVACHOL) 80 MG TABLET    TAKE ONE TABLET BY MOUTH EVERY EVENING FOR HIGH CHOLESTEROL       ALLERGIES     Penicillins    FAMILY HISTORY       Family History   Problem Relation Age of Onset    Cancer Father         Stomach CA    Heart Disease Father 54        MI x 2          SOCIAL HISTORY       Social History     Social History    Marital status:      Spouse name: N/A    Number of children: N/A    Years of education: N/A     Social History Main Topics    Smoking status: Never Smoker    Smokeless tobacco: Current User    Alcohol use 3.6 oz/week     6 Cans of beer per week      Comment: 10 - 15  BEERS A WK x 15 yrs then decreased 10 a month    Drug use: No    Sexual activity: Not Asked     Other Topics Concern    None     Social History Narrative    None       SCREENINGS             PHYSICAL mg (1 mg Intravenous Given 8/15/18 0543)   ondansetron (ZOFRAN) injection 4 mg (4 mg Intravenous Given 8/15/18 0543)   ketorolac (TORADOL) injection 30 mg (30 mg Intravenous Given 8/15/18 0644)   tamsulosin (FLOMAX) capsule 0.4 mg (0.4 mg Oral Given 8/15/18 0644)   0.9 % sodium chloride bolus (0 mLs Intravenous Stopped 8/15/18 0722)     Patient was evaluated due to concern for left flank pain radiating to the left lower quadrant and left testicle that awoke him this morning. I considered kidney stone, pyelonephritis, testicular torsion, diverticulitis, AAA, bowel obstruction, amongst other pathology. CT scan showed kidney stone at the left UVJ that was 3 mm. After Toradol, his pain was gone and he no longer had any flank pain, testicular pain. Repeat abdominal exam and back exam had no tenderness. The patient then palpated his own testicles and stated he had absolute no tenderness. He was given strict return precautions for any worsening abdominal pain, back pain, testicular pain, but otherwise told to follow-up with urology in the next 3-5 days for any other concerns with his kidney stone. He was well-appearing and in no acute distress at time of discharge and felt comfortable this plan. The patient tolerated their visit well. The patient and / or the family were informed of the results of any tests, a time was given to answer questions. FINAL IMPRESSION      1. Left ureteral calculus    2. Acute left flank pain    3.  Hyperglycemia          DISPOSITION/PLAN   DISPOSITION Decision To Discharge 08/15/2018 07:53:27 AM      PATIENT REFERRED TO:  [facility]  ED  [address]  Go to   If symptoms worsen    [facility]  [phone]    Schedule an appointment as soon as possible for a visit in 3 days  For any other concerns with flank or testicular pain    Quita Gao MD  [address] [facility] [address]

## 2018-08-22 ENCOUNTER — TELEPHONE (OUTPATIENT)
Dept: INTERNAL MEDICINE CLINIC | Age: 63
End: 2018-08-22

## 2018-08-22 ENCOUNTER — HOSPITAL ENCOUNTER (OUTPATIENT)
Age: 63
Discharge: HOME | End: 2018-08-22
Payer: COMMERCIAL

## 2018-08-22 DIAGNOSIS — E78.5 DYSLIPIDEMIA: Primary | ICD-10-CM

## 2018-08-22 DIAGNOSIS — G70.00 MG (MYASTHENIA GRAVIS) (HCC): ICD-10-CM

## 2018-08-22 DIAGNOSIS — E78.5 HYPERLIPIDEMIA, UNSPECIFIED HYPERLIPIDEMIA TYPE: ICD-10-CM

## 2018-08-22 DIAGNOSIS — M19.90 ARTHRITIS: ICD-10-CM

## 2018-08-22 LAB
RHEUMATOID FACTOR: <10 IU/ML
SEDIMENTATION RATE, ERYTHROCYTE: 15 MM/HR (ref 0–20)

## 2018-08-22 PROCEDURE — 86431 RHEUMATOID FACTOR QUANT: CPT

## 2018-08-22 PROCEDURE — 85652 RBC SED RATE AUTOMATED: CPT

## 2018-08-22 PROCEDURE — 36415 COLL VENOUS BLD VENIPUNCTURE: CPT

## 2018-08-27 ENCOUNTER — TELEPHONE (OUTPATIENT)
Dept: INTERNAL MEDICINE CLINIC | Age: 63
End: 2018-08-27

## 2018-08-27 DIAGNOSIS — M25.561 CHRONIC PAIN OF BOTH KNEES: Primary | ICD-10-CM

## 2018-08-27 DIAGNOSIS — G89.29 CHRONIC PAIN OF BOTH KNEES: Primary | ICD-10-CM

## 2018-08-27 DIAGNOSIS — M25.562 CHRONIC PAIN OF BOTH KNEES: Primary | ICD-10-CM

## 2019-01-07 ENCOUNTER — OFFICE VISIT (OUTPATIENT)
Dept: INTERNAL MEDICINE CLINIC | Age: 64
End: 2019-01-07
Payer: COMMERCIAL

## 2019-01-07 VITALS
DIASTOLIC BLOOD PRESSURE: 82 MMHG | BODY MASS INDEX: 30.28 KG/M2 | SYSTOLIC BLOOD PRESSURE: 128 MMHG | OXYGEN SATURATION: 98 % | WEIGHT: 211 LBS | HEART RATE: 82 BPM

## 2019-01-07 DIAGNOSIS — Z23 NEED FOR PROPHYLACTIC VACCINATION AND INOCULATION AGAINST INFLUENZA: ICD-10-CM

## 2019-01-07 DIAGNOSIS — J01.00 ACUTE MAXILLARY SINUSITIS, RECURRENCE NOT SPECIFIED: Primary | ICD-10-CM

## 2019-01-07 DIAGNOSIS — G70.00 MG (MYASTHENIA GRAVIS) (HCC): ICD-10-CM

## 2019-01-07 DIAGNOSIS — Z82.49 FAMILY HISTORY OF EARLY CAD: ICD-10-CM

## 2019-01-07 DIAGNOSIS — Z12.5 SPECIAL SCREENING FOR MALIGNANT NEOPLASM OF PROSTATE: ICD-10-CM

## 2019-01-07 DIAGNOSIS — E78.5 HYPERLIPIDEMIA, UNSPECIFIED HYPERLIPIDEMIA TYPE: ICD-10-CM

## 2019-01-07 DIAGNOSIS — N20.0 KIDNEY STONE: ICD-10-CM

## 2019-01-07 DIAGNOSIS — R21 RASH: ICD-10-CM

## 2019-01-07 PROCEDURE — 90471 IMMUNIZATION ADMIN: CPT | Performed by: INTERNAL MEDICINE

## 2019-01-07 PROCEDURE — 90686 IIV4 VACC NO PRSV 0.5 ML IM: CPT | Performed by: INTERNAL MEDICINE

## 2019-01-07 PROCEDURE — 99214 OFFICE O/P EST MOD 30 MIN: CPT | Performed by: INTERNAL MEDICINE

## 2019-01-07 RX ORDER — AZITHROMYCIN 250 MG/1
TABLET, FILM COATED ORAL
Qty: 1 PACKET | Refills: 0 | Status: SHIPPED | OUTPATIENT
Start: 2019-01-07 | End: 2019-01-17

## 2019-01-25 ENCOUNTER — HOSPITAL ENCOUNTER (OUTPATIENT)
Age: 64
Discharge: HOME | End: 2019-01-25
Payer: COMMERCIAL

## 2019-01-25 DIAGNOSIS — G70.00 MG (MYASTHENIA GRAVIS) (HCC): ICD-10-CM

## 2019-01-25 DIAGNOSIS — E78.5 DYSLIPIDEMIA: ICD-10-CM

## 2019-01-25 LAB
A/G RATIO: 1.4 (ref 1.1–2.2)
ALBUMIN SERPL-MCNC: 4.3 G/DL (ref 3.4–5)
ALP BLD-CCNC: 59 U/L (ref 40–129)
ALT SERPL-CCNC: 39 U/L (ref 10–40)
ANION GAP SERPL CALCULATED.3IONS-SCNC: 12 MMOL/L (ref 3–16)
AST SERPL-CCNC: 32 U/L (ref 15–37)
BILIRUB SERPL-MCNC: 0.5 MG/DL (ref 0–1)
BUN BLDV-MCNC: 14 MG/DL (ref 7–20)
CALCIUM SERPL-MCNC: 9.5 MG/DL (ref 8.3–10.6)
CHLORIDE BLD-SCNC: 106 MMOL/L (ref 99–110)
CHOLESTEROL, TOTAL: 176 MG/DL (ref 0–199)
CO2: 26 MMOL/L (ref 21–32)
CREAT SERPL-MCNC: 0.9 MG/DL (ref 0.8–1.3)
GFR AFRICAN AMERICAN: >60
GFR NON-AFRICAN AMERICAN: >60
GLOBULIN: 3.1 G/DL
GLUCOSE BLD-MCNC: 114 MG/DL (ref 70–99)
HCT VFR BLD CALC: 45.4 % (ref 40.5–52.5)
HDLC SERPL-MCNC: 48 MG/DL (ref 40–60)
HEMOGLOBIN: 15.1 G/DL (ref 13.5–17.5)
LDL CHOLESTEROL CALCULATED: 90 MG/DL
MCH RBC QN AUTO: 29.3 PG (ref 26–34)
MCHC RBC AUTO-ENTMCNC: 33.2 G/DL (ref 31–36)
MCV RBC AUTO: 88.4 FL (ref 80–100)
PDW BLD-RTO: 13.7 % (ref 12.4–15.4)
PLATELET # BLD: 236 K/UL (ref 135–450)
PMV BLD AUTO: 8.8 FL (ref 5–10.5)
POTASSIUM SERPL-SCNC: 4.8 MMOL/L (ref 3.5–5.1)
RBC # BLD: 5.13 M/UL (ref 4.2–5.9)
SODIUM BLD-SCNC: 144 MMOL/L (ref 136–145)
TOTAL PROTEIN: 7.4 G/DL (ref 6.4–8.2)
TRIGL SERPL-MCNC: 189 MG/DL (ref 0–150)
VLDLC SERPL CALC-MCNC: 38 MG/DL
WBC # BLD: 6 K/UL (ref 4–11)

## 2019-01-25 PROCEDURE — 80061 LIPID PANEL: CPT

## 2019-01-25 PROCEDURE — 80053 COMPREHEN METABOLIC PANEL: CPT

## 2019-01-25 PROCEDURE — 36415 COLL VENOUS BLD VENIPUNCTURE: CPT

## 2019-01-25 PROCEDURE — 85027 COMPLETE CBC AUTOMATED: CPT

## 2019-10-02 ENCOUNTER — OFFICE VISIT (OUTPATIENT)
Dept: INTERNAL MEDICINE CLINIC | Age: 64
End: 2019-10-02
Payer: COMMERCIAL

## 2019-10-02 VITALS
HEART RATE: 82 BPM | OXYGEN SATURATION: 98 % | BODY MASS INDEX: 29.7 KG/M2 | WEIGHT: 207 LBS | DIASTOLIC BLOOD PRESSURE: 82 MMHG | SYSTOLIC BLOOD PRESSURE: 120 MMHG

## 2019-10-02 DIAGNOSIS — G89.29 CHRONIC PAIN OF BOTH FEET: Primary | ICD-10-CM

## 2019-10-02 DIAGNOSIS — M79.672 CHRONIC PAIN OF BOTH FEET: Primary | ICD-10-CM

## 2019-10-02 DIAGNOSIS — G89.29 CHRONIC PAIN OF BOTH KNEES: ICD-10-CM

## 2019-10-02 DIAGNOSIS — G25.0 ESSENTIAL TREMOR: ICD-10-CM

## 2019-10-02 DIAGNOSIS — R29.898 LEG HEAVINESS: ICD-10-CM

## 2019-10-02 DIAGNOSIS — M25.561 CHRONIC PAIN OF BOTH KNEES: ICD-10-CM

## 2019-10-02 DIAGNOSIS — M25.562 CHRONIC PAIN OF BOTH KNEES: ICD-10-CM

## 2019-10-02 DIAGNOSIS — M79.671 CHRONIC PAIN OF BOTH FEET: Primary | ICD-10-CM

## 2019-10-02 PROCEDURE — 99213 OFFICE O/P EST LOW 20 MIN: CPT | Performed by: NURSE PRACTITIONER

## 2019-10-02 RX ORDER — PREDNISONE 20 MG/1
TABLET ORAL
Qty: 10 TABLET | Refills: 0 | Status: SHIPPED | OUTPATIENT
Start: 2019-10-02 | End: 2020-01-13 | Stop reason: ALTCHOICE

## 2019-10-02 ASSESSMENT — PATIENT HEALTH QUESTIONNAIRE - PHQ9
SUM OF ALL RESPONSES TO PHQ QUESTIONS 1-9: 0
SUM OF ALL RESPONSES TO PHQ9 QUESTIONS 1 & 2: 0
1. LITTLE INTEREST OR PLEASURE IN DOING THINGS: 0
SUM OF ALL RESPONSES TO PHQ QUESTIONS 1-9: 0
2. FEELING DOWN, DEPRESSED OR HOPELESS: 0

## 2019-10-03 ASSESSMENT — ENCOUNTER SYMPTOMS
DIARRHEA: 0
VOMITING: 0
COUGH: 0
FACIAL SWELLING: 0
SINUS PRESSURE: 0
NAUSEA: 0
SORE THROAT: 0

## 2019-10-07 ENCOUNTER — HOSPITAL ENCOUNTER (OUTPATIENT)
Dept: GENERAL RADIOLOGY | Age: 64
Discharge: HOME | End: 2019-10-07
Payer: COMMERCIAL

## 2019-10-07 ENCOUNTER — HOSPITAL ENCOUNTER (OUTPATIENT)
Age: 64
Discharge: HOME | End: 2019-10-07
Payer: COMMERCIAL

## 2019-10-07 DIAGNOSIS — G89.29 CHRONIC PAIN OF BOTH KNEES: ICD-10-CM

## 2019-10-07 DIAGNOSIS — M79.671 CHRONIC PAIN OF BOTH FEET: ICD-10-CM

## 2019-10-07 DIAGNOSIS — M79.672 CHRONIC PAIN OF BOTH FEET: ICD-10-CM

## 2019-10-07 DIAGNOSIS — M25.561 CHRONIC PAIN OF BOTH KNEES: ICD-10-CM

## 2019-10-07 DIAGNOSIS — G89.29 CHRONIC PAIN OF BOTH FEET: ICD-10-CM

## 2019-10-07 DIAGNOSIS — M25.562 CHRONIC PAIN OF BOTH KNEES: ICD-10-CM

## 2019-10-07 PROCEDURE — 73620 X-RAY EXAM OF FOOT: CPT

## 2019-10-07 PROCEDURE — 73560 X-RAY EXAM OF KNEE 1 OR 2: CPT

## 2019-10-08 DIAGNOSIS — M25.562 CHRONIC PAIN OF BOTH KNEES: Primary | ICD-10-CM

## 2019-10-08 DIAGNOSIS — M25.561 CHRONIC PAIN OF BOTH KNEES: Primary | ICD-10-CM

## 2019-10-08 DIAGNOSIS — R93.6 ABNORMAL X-RAY OF KNEE: ICD-10-CM

## 2019-10-08 DIAGNOSIS — G89.29 CHRONIC PAIN OF BOTH KNEES: Primary | ICD-10-CM

## 2019-10-17 ENCOUNTER — OFFICE VISIT (OUTPATIENT)
Dept: ORTHOPEDIC SURGERY | Age: 64
End: 2019-10-17
Payer: COMMERCIAL

## 2019-10-17 VITALS — BODY MASS INDEX: 29.64 KG/M2 | HEIGHT: 70 IN | WEIGHT: 207.01 LBS

## 2019-10-17 DIAGNOSIS — M25.561 PAIN IN BOTH KNEES, UNSPECIFIED CHRONICITY: Primary | ICD-10-CM

## 2019-10-17 DIAGNOSIS — M17.0 BILATERAL PRIMARY OSTEOARTHRITIS OF KNEE: ICD-10-CM

## 2019-10-17 DIAGNOSIS — M25.562 PAIN IN BOTH KNEES, UNSPECIFIED CHRONICITY: Primary | ICD-10-CM

## 2019-10-17 PROCEDURE — 99214 OFFICE O/P EST MOD 30 MIN: CPT | Performed by: ORTHOPAEDIC SURGERY

## 2019-10-17 RX ORDER — DICLOFENAC SODIUM 75 MG/1
75 TABLET, DELAYED RELEASE ORAL 2 TIMES DAILY WITH MEALS
Qty: 60 TABLET | Refills: 1 | Status: SHIPPED | OUTPATIENT
Start: 2019-10-17

## 2020-01-10 ENCOUNTER — TELEPHONE (OUTPATIENT)
Dept: INTERNAL MEDICINE CLINIC | Age: 65
End: 2020-01-10

## 2020-01-11 ENCOUNTER — HOSPITAL ENCOUNTER (OUTPATIENT)
Age: 65
Discharge: HOME | End: 2020-01-11
Payer: COMMERCIAL

## 2020-01-11 LAB
A/G RATIO: 1.5 (ref 1.1–2.2)
ALBUMIN SERPL-MCNC: 4.2 G/DL (ref 3.4–5)
ALP BLD-CCNC: 65 U/L (ref 40–129)
ALT SERPL-CCNC: 43 U/L (ref 10–40)
ANION GAP SERPL CALCULATED.3IONS-SCNC: 14 MMOL/L (ref 3–16)
AST SERPL-CCNC: 31 U/L (ref 15–37)
BILIRUB SERPL-MCNC: 0.5 MG/DL (ref 0–1)
BUN BLDV-MCNC: 14 MG/DL (ref 7–20)
CALCIUM SERPL-MCNC: 9.4 MG/DL (ref 8.3–10.6)
CHLORIDE BLD-SCNC: 104 MMOL/L (ref 99–110)
CHOLESTEROL, TOTAL: 148 MG/DL (ref 0–199)
CO2: 24 MMOL/L (ref 21–32)
CREAT SERPL-MCNC: 0.8 MG/DL (ref 0.8–1.3)
GFR AFRICAN AMERICAN: >60
GFR NON-AFRICAN AMERICAN: >60
GLOBULIN: 2.8 G/DL
GLUCOSE BLD-MCNC: 115 MG/DL (ref 70–99)
HDLC SERPL-MCNC: 50 MG/DL (ref 40–60)
HEPATITIS C ANTIBODY INTERPRETATION: NORMAL
LDL CHOLESTEROL CALCULATED: 63 MG/DL
POTASSIUM SERPL-SCNC: 4.5 MMOL/L (ref 3.5–5.1)
PROSTATE SPECIFIC ANTIGEN: 4.93 NG/ML (ref 0–4)
SODIUM BLD-SCNC: 142 MMOL/L (ref 136–145)
TOTAL PROTEIN: 7 G/DL (ref 6.4–8.2)
TRIGL SERPL-MCNC: 175 MG/DL (ref 0–150)
VLDLC SERPL CALC-MCNC: 35 MG/DL

## 2020-01-11 PROCEDURE — 36415 COLL VENOUS BLD VENIPUNCTURE: CPT

## 2020-01-11 PROCEDURE — 86803 HEPATITIS C AB TEST: CPT

## 2020-01-11 PROCEDURE — 87390 HIV-1 AG IA: CPT

## 2020-01-11 PROCEDURE — 80061 LIPID PANEL: CPT

## 2020-01-11 PROCEDURE — 84153 ASSAY OF PSA TOTAL: CPT

## 2020-01-11 PROCEDURE — 80053 COMPREHEN METABOLIC PANEL: CPT

## 2020-01-11 PROCEDURE — 86702 HIV-2 ANTIBODY: CPT

## 2020-01-11 PROCEDURE — 86701 HIV-1ANTIBODY: CPT

## 2020-01-12 LAB
HIV AG/AB: NORMAL
HIV ANTIGEN: NORMAL
HIV-1 ANTIBODY: NORMAL
HIV-2 AB: NORMAL

## 2020-01-13 ENCOUNTER — OFFICE VISIT (OUTPATIENT)
Dept: INTERNAL MEDICINE CLINIC | Age: 65
End: 2020-01-13
Payer: COMMERCIAL

## 2020-01-13 VITALS
SYSTOLIC BLOOD PRESSURE: 118 MMHG | HEART RATE: 78 BPM | DIASTOLIC BLOOD PRESSURE: 78 MMHG | WEIGHT: 213 LBS | RESPIRATION RATE: 16 BRPM | BODY MASS INDEX: 30.56 KG/M2 | OXYGEN SATURATION: 96 %

## 2020-01-13 PROBLEM — M17.0 OSTEOARTHRITIS OF BOTH KNEES: Status: ACTIVE | Noted: 2020-01-13

## 2020-01-13 PROCEDURE — 99214 OFFICE O/P EST MOD 30 MIN: CPT | Performed by: INTERNAL MEDICINE

## 2020-01-13 PROCEDURE — 90686 IIV4 VACC NO PRSV 0.5 ML IM: CPT | Performed by: INTERNAL MEDICINE

## 2020-01-13 PROCEDURE — 90471 IMMUNIZATION ADMIN: CPT | Performed by: INTERNAL MEDICINE

## 2020-01-13 ASSESSMENT — PATIENT HEALTH QUESTIONNAIRE - PHQ9
SUM OF ALL RESPONSES TO PHQ QUESTIONS 1-9: 0
SUM OF ALL RESPONSES TO PHQ9 QUESTIONS 1 & 2: 0
SUM OF ALL RESPONSES TO PHQ QUESTIONS 1-9: 0
1. LITTLE INTEREST OR PLEASURE IN DOING THINGS: 0
2. FEELING DOWN, DEPRESSED OR HOPELESS: 0

## 2020-01-13 NOTE — PROGRESS NOTES
2020     Nichole Iglesias (:  1955) is a 60 y.o. male, here for evaluation of the following medical concerns:  CC: Hyperlipidemia  HPI  Patient with hyperlipidemia, arthritis, myasthenia gravis, positive family history for coronary artery disease, kidney stone history. Reviewed: Office visit: 10/17/2019: Dr. Mele Del Rosario, [address] [place] [address] orthopedics, bilateral knee pain. DX: Osteoarthritis. Treated with Voltaren and glucosamine. Persistent pain problems. Reviewed laboratory data 2020. Chemistry panel: Glucose: 115. ALT: 43.  Lipid panel: Total cholesterol: 148. LDL cholesterol: 63.  Triglycerides: 175. PSA  4.93.  HIV and hepatitis C nonreactive. Social history: Alcohol: 6-8 beers in a week. Review of Systems    Review of Systems   Constitutional: negative   HENT: negative   EYES: negative   Respiratory: negative   Gastrointestinal: negative   Endocrine: negative   Musculoskeletal: Bilateral knee osteoarthritis  Skin: negative   Allergic/Immunological: negative   Hematological: negative   Psychiatric/Behavorial: negative   CV: negative   CNS: negative   :Negative   S/E:Negative  Renal: Negative      Prior to Visit Medications    Medication Sig Taking? Authorizing Provider   diclofenac (VOLTAREN) 75 MG EC tablet Take 1 tablet by mouth 2 times daily (with meals) Yes Taran Bright MD   pravastatin (PRAVACHOL) 80 MG tablet TAKE ONE TABLET BY MOUTH EVERY EVENING FOR HIGH CHOLESTEROL Yes April Gao MD   clobetasol (OLUX) 0.05 % foam  Yes Historical Provider, MD   econazole nitrate 1 % cream  Yes Historical Provider, MD   multivitamin (THERAGRAN) per tablet Take 1 tablet by mouth daily. Yes Historical Provider, MD        Social History     Tobacco Use    Smoking status: Never Smoker    Smokeless tobacco: Current User   Substance Use Topics    Alcohol use:  Yes     Alcohol/week: 6.0 standard drinks     Types: 6 Cans of beer per week     Comment: 10 - 15  BEERS A WK x 15 yrs then decreased 10 a month        Vitals:    01/13/20 1448   BP: 118/78   Site: Right Upper Arm   Position: Sitting   Cuff Size: Large Adult   Pulse: 78   Resp: 16   SpO2: 96%   Weight: 213 lb (96.6 kg)     Estimated body mass index is 30.56 kg/m² as calculated from the following:    Height as of 10/17/19: 5' 10\" (1.778 m). Weight as of this encounter: 213 lb (96.6 kg). Physical Exam  Head/neck: Ears: Normal TM. No obstruction. Throat: No exudates, no erythema, no tonsillar enlargement. Neck: No lymphadenopathy. Thyroid not palpable. Eyes: EOMI, PERRLA with no nystagmus  Lungs: Clear to auscultation. CV: S1-S2 normal.   RAJENDRA murmur. Carotid: No bruit. Abdominal Examination: Bowel sounds present. Soft nontender. No mass no guarding or   rebound. Spine/extremities: No edema. No tenderness to palpation. Skin: No rash  CNS: Patient is alert, cooperative, moves all 4 limbs, ambulates without difficulty, light touch normal.  Deep tendon reflexes normal.  Good orientation. Rectal/Prostate Exam: Declined by patient  Blood pressure 118/78, pulse 78, resp. rate 16, weight 213 lb (96.6 kg), SpO2 96 %. ASSESSMENT/PLAN:  1. Hyperlipidemia, unspecified hyperlipidemia type     Borderline elevated triglycerides. - Lipid Panel; Future  - Comprehensive Metabolic Panel; Future    2. Elevated PSA     Elevated PSA  4.93. Alisha Chairez Referral to urology:  [facility]    3. IFG (impaired fasting glucose)  Glucose: 115.   - Hemoglobin A1C; Future    4. Primary osteoarthritis of both knees  Seen by orthopedics as noted above. 5.  Immunization:  Influenza vaccine given today after discussing risk and benefits    6. Give slip to obtain fasting laboratory study before next office visit  Return to follow-up  Dr. brandi Coelho in about 6 months (around 7/13/2020) for LIPIDS. An electronic signature was used to authenticate this note.     --Rancho Gaspar MD on 1/13/2020 at 6:17 PM

## 2020-02-14 ENCOUNTER — TELEPHONE (OUTPATIENT)
Dept: INTERNAL MEDICINE CLINIC | Age: 65
End: 2020-02-14

## 2020-02-14 RX ORDER — TAMSULOSIN HYDROCHLORIDE 0.4 MG/1
0.4 CAPSULE ORAL DAILY
Qty: 10 CAPSULE | Refills: 0 | Status: SHIPPED | OUTPATIENT
Start: 2020-02-14 | End: 2022-09-19

## 2020-02-14 RX ORDER — HYDROCODONE BITARTRATE AND ACETAMINOPHEN 5; 325 MG/1; MG/1
1 TABLET ORAL EVERY 6 HOURS PRN
Qty: 12 TABLET | Refills: 0 | Status: SHIPPED | OUTPATIENT
Start: 2020-02-14 | End: 2020-03-13 | Stop reason: SDUPTHER

## 2020-02-15 ENCOUNTER — HOSPITAL ENCOUNTER (EMERGENCY)
Age: 65
Discharge: HOME | End: 2020-02-15
Attending: EMERGENCY MEDICINE
Payer: COMMERCIAL

## 2020-02-15 ENCOUNTER — APPOINTMENT (OUTPATIENT)
Dept: CT IMAGING | Age: 65
End: 2020-02-15
Payer: COMMERCIAL

## 2020-02-15 VITALS
HEIGHT: 70 IN | BODY MASS INDEX: 30.06 KG/M2 | RESPIRATION RATE: 10 BRPM | HEART RATE: 81 BPM | SYSTOLIC BLOOD PRESSURE: 130 MMHG | OXYGEN SATURATION: 98 % | DIASTOLIC BLOOD PRESSURE: 77 MMHG | WEIGHT: 210 LBS | TEMPERATURE: 97.6 F

## 2020-02-15 LAB
A/G RATIO: 1.5 (ref 1.1–2.2)
ALBUMIN SERPL-MCNC: 4.4 G/DL (ref 3.4–5)
ALP BLD-CCNC: 81 U/L (ref 40–129)
ALT SERPL-CCNC: 29 U/L (ref 10–40)
ANION GAP SERPL CALCULATED.3IONS-SCNC: 14 MMOL/L (ref 3–16)
AST SERPL-CCNC: 28 U/L (ref 15–37)
BASOPHILS ABSOLUTE: 0 K/UL (ref 0–0.2)
BASOPHILS RELATIVE PERCENT: 0.4 %
BILIRUB SERPL-MCNC: 0.6 MG/DL (ref 0–1)
BILIRUBIN URINE: NEGATIVE
BLOOD, URINE: NEGATIVE
BUN BLDV-MCNC: 13 MG/DL (ref 7–20)
CALCIUM SERPL-MCNC: 9.2 MG/DL (ref 8.3–10.6)
CHLORIDE BLD-SCNC: 101 MMOL/L (ref 99–110)
CLARITY: CLEAR
CO2: 25 MMOL/L (ref 21–32)
COLOR: YELLOW
CREAT SERPL-MCNC: 0.8 MG/DL (ref 0.8–1.3)
EOSINOPHILS ABSOLUTE: 0.2 K/UL (ref 0–0.6)
EOSINOPHILS RELATIVE PERCENT: 3 %
GFR AFRICAN AMERICAN: >60
GFR NON-AFRICAN AMERICAN: >60
GLOBULIN: 3 G/DL
GLUCOSE BLD-MCNC: 134 MG/DL (ref 70–99)
GLUCOSE URINE: NEGATIVE MG/DL
HCT VFR BLD CALC: 45.9 % (ref 40.5–52.5)
HEMOGLOBIN: 15.4 G/DL (ref 13.5–17.5)
KETONES, URINE: NEGATIVE MG/DL
LEUKOCYTE ESTERASE, URINE: NEGATIVE
LYMPHOCYTES ABSOLUTE: 1.6 K/UL (ref 1–5.1)
LYMPHOCYTES RELATIVE PERCENT: 19.4 %
MCH RBC QN AUTO: 29.3 PG (ref 26–34)
MCHC RBC AUTO-ENTMCNC: 33.6 G/DL (ref 31–36)
MCV RBC AUTO: 87.3 FL (ref 80–100)
MICROSCOPIC EXAMINATION: NORMAL
MONOCYTES ABSOLUTE: 0.7 K/UL (ref 0–1.3)
MONOCYTES RELATIVE PERCENT: 8.9 %
NEUTROPHILS ABSOLUTE: 5.5 K/UL (ref 1.7–7.7)
NEUTROPHILS RELATIVE PERCENT: 68.3 %
NITRITE, URINE: NEGATIVE
PDW BLD-RTO: 14 % (ref 12.4–15.4)
PH UA: 5.5 (ref 5–8)
PLATELET # BLD: 234 K/UL (ref 135–450)
PMV BLD AUTO: 7.7 FL (ref 5–10.5)
POTASSIUM SERPL-SCNC: 3.8 MMOL/L (ref 3.5–5.1)
PROTEIN UA: NEGATIVE MG/DL
RBC # BLD: 5.26 M/UL (ref 4.2–5.9)
SODIUM BLD-SCNC: 140 MMOL/L (ref 136–145)
SPECIFIC GRAVITY UA: 1.02 (ref 1–1.03)
TOTAL PROTEIN: 7.4 G/DL (ref 6.4–8.2)
URINE REFLEX TO CULTURE: NORMAL
URINE TYPE: NORMAL
UROBILINOGEN, URINE: 0.2 E.U./DL
WBC # BLD: 8.1 K/UL (ref 4–11)

## 2020-02-15 PROCEDURE — 6370000000 HC RX 637 (ALT 250 FOR IP): Performed by: EMERGENCY MEDICINE

## 2020-02-15 PROCEDURE — 99284 EMERGENCY DEPT VISIT MOD MDM: CPT

## 2020-02-15 PROCEDURE — 2580000003 HC RX 258: Performed by: EMERGENCY MEDICINE

## 2020-02-15 PROCEDURE — 96374 THER/PROPH/DIAG INJ IV PUSH: CPT

## 2020-02-15 PROCEDURE — 85025 COMPLETE CBC W/AUTO DIFF WBC: CPT

## 2020-02-15 PROCEDURE — 6360000002 HC RX W HCPCS: Performed by: EMERGENCY MEDICINE

## 2020-02-15 PROCEDURE — 74176 CT ABD & PELVIS W/O CONTRAST: CPT

## 2020-02-15 PROCEDURE — 81003 URINALYSIS AUTO W/O SCOPE: CPT

## 2020-02-15 PROCEDURE — 80053 COMPREHEN METABOLIC PANEL: CPT

## 2020-02-15 RX ORDER — KETOROLAC TROMETHAMINE 30 MG/ML
30 INJECTION, SOLUTION INTRAMUSCULAR; INTRAVENOUS ONCE
Status: COMPLETED | OUTPATIENT
Start: 2020-02-15 | End: 2020-02-15

## 2020-02-15 RX ORDER — METHOCARBAMOL 500 MG/1
500 TABLET, FILM COATED ORAL 4 TIMES DAILY PRN
Qty: 20 TABLET | Refills: 0 | Status: SHIPPED | OUTPATIENT
Start: 2020-02-15

## 2020-02-15 RX ORDER — LIDOCAINE 4 G/G
1 PATCH TOPICAL ONCE
Status: DISCONTINUED | OUTPATIENT
Start: 2020-02-15 | End: 2020-02-15 | Stop reason: HOSPADM

## 2020-02-15 RX ORDER — 0.9 % SODIUM CHLORIDE 0.9 %
1000 INTRAVENOUS SOLUTION INTRAVENOUS ONCE
Status: COMPLETED | OUTPATIENT
Start: 2020-02-15 | End: 2020-02-15

## 2020-02-15 RX ADMIN — SODIUM CHLORIDE 1000 ML: 9 INJECTION, SOLUTION INTRAVENOUS at 08:13

## 2020-02-15 RX ADMIN — KETOROLAC TROMETHAMINE 30 MG: 30 INJECTION, SOLUTION INTRAMUSCULAR at 08:41

## 2020-02-15 RX ADMIN — HYDROMORPHONE HYDROCHLORIDE 1 MG: 1 INJECTION, SOLUTION INTRAMUSCULAR; INTRAVENOUS; SUBCUTANEOUS at 08:13

## 2020-02-15 ASSESSMENT — PAIN DESCRIPTION - ORIENTATION
ORIENTATION: LEFT
ORIENTATION: LEFT

## 2020-02-15 ASSESSMENT — PAIN SCALES - GENERAL
PAINLEVEL_OUTOF10: 7
PAINLEVEL_OUTOF10: 9
PAINLEVEL_OUTOF10: 5
PAINLEVEL_OUTOF10: 9

## 2020-02-15 ASSESSMENT — PAIN DESCRIPTION - LOCATION
LOCATION: FLANK
LOCATION: FLANK

## 2020-02-15 NOTE — ED PROVIDER NOTES
[address] [facility]  ED      CHIEF COMPLAINT  Flank Pain (left sided flank pain started mid day yesterday. history of kidney stones quite often. Last spring he was in for the same thing. Urologist with Macario Munguia)       HISTORY OF PRESENT ILLNESS  Kate Lewis is a 63 y.o. male  who presents to the ED complaining of left-sided flank pain. Started yesterday afternoon and he has been working out quite a bit to thought that possibly it was secondary to that. He states that he is bone spur in his back. However, is progressively worsened and last night he had called the on-call provider and was told to try to start Flomax and Percocet as the ER is quite busy at that time. He tried to treat it supportively at home but the pain is been unrelenting so he presents now for further evaluation. Is a sharp pain localized to the left flank without any radiation. It is worse with certain movements. The Percocet only helped for about 15 minutes but otherwise has been not controlling the pain. He is not noticing difficulty urinating, dysuria or hematuria. He has felt slightly nauseated but no vomiting. No change to bowel movements. He has a history of kidney stones and follows with Dr. Macario Munguia for his urologic care. He states that his last stone that needed any type of intervention was about 2 years ago. No other complaints, modifying factors or associated symptoms. I have reviewed the following from the nursing documentation.     Past Medical History:   Diagnosis Date    Adenomatous colon polyp 04/17/09    re do 2014    Agitation     Erectile dysfunction     Family history of early CAD 04/2010    Coronary Ca+ Score = \" 10\"    Hx of blood clots     calf post op     Hyperlipidemia     Kidney stone 03/2005    Lesion of ulnar nerve 4/2/2015    Osteoarthritis of both knees 1/13/2020    PONV (postoperative nausea and vomiting)     Shingles      Past Surgical History:   Procedure Laterality Date  ACHILLES TENDON SURGERY      rupture/repair    COLONOSCOPY  04/07/09    Tubular Adenoma    COLONOSCOPY  1/12/15    ELBOW SURGERY Right     EXCISION OF AURAL MASS      KIDNEY STONE SURGERY      KNEE ARTHROSCOPY      rt    TENOLYSIS      VASECTOMY      VASECTOMY  1992     Family History   Problem Relation Age of Onset    Cancer Father         Stomach CA    Heart Disease Father 54        MI x 2     Social History     Socioeconomic History    Marital status:      Spouse name: Not on file    Number of children: Not on file    Years of education: Not on file    Highest education level: Not on file   Occupational History    Not on file   Social Needs    Financial resource strain: Not on file    Food insecurity:     Worry: Not on file     Inability: Not on file    Transportation needs:     Medical: Not on file     Non-medical: Not on file   Tobacco Use    Smoking status: Never Smoker    Smokeless tobacco: Current User   Substance and Sexual Activity    Alcohol use:  Yes     Alcohol/week: 6.0 standard drinks     Types: 6 Cans of beer per week     Comment: 10 - 15  BEERS A WK x 15 yrs then decreased 10 a month    Drug use: No    Sexual activity: Not on file   Lifestyle    Physical activity:     Days per week: Not on file     Minutes per session: Not on file    Stress: Not on file   Relationships    Social connections:     Talks on phone: Not on file     Gets together: Not on file     Attends Catholic service: Not on file     Active member of club or organization: Not on file     Attends meetings of clubs or organizations: Not on file     Relationship status: Not on file    Intimate partner violence:     Fear of current or ex partner: Not on file     Emotionally abused: Not on file     Physically abused: Not on file     Forced sexual activity: Not on file   Other Topics Concern    Not on file   Social History Narrative    Not on file     Current Facility-Administered Medications Medication Dose Route Frequency Provider Last Rate Last Dose    HYDROmorphone (DILAUDID) injection 1 mg  1 mg Intravenous Q1H PRN Abigail Billy MD   1 mg at 02/15/20 0813    lidocaine 4 % external patch 1 patch  1 patch Transdermal Once Abigail Billy MD   1 patch at 02/15/20 1000     Current Outpatient Medications   Medication Sig Dispense Refill    methocarbamol (ROBAXIN) 500 MG tablet Take 1 tablet by mouth 4 times daily as needed (muscle spasms) 20 tablet 0    HYDROcodone-acetaminophen (NORCO) 5-325 MG per tablet Take 1 tablet by mouth every 6 hours as needed for Pain for up to 3 days. Intended supply: 3 days. Take lowest dose possible to manage pain 12 tablet 0    tamsulosin (FLOMAX) 0.4 MG capsule Take 1 capsule by mouth daily 10 capsule 0    diclofenac (VOLTAREN) 75 MG EC tablet Take 1 tablet by mouth 2 times daily (with meals) 60 tablet 1    pravastatin (PRAVACHOL) 80 MG tablet TAKE ONE TABLET BY MOUTH EVERY EVENING FOR HIGH CHOLESTEROL 30 tablet 9    clobetasol (OLUX) 0.05 % foam       econazole nitrate 1 % cream       multivitamin (THERAGRAN) per tablet Take 1 tablet by mouth daily. Allergies   Allergen Reactions    Penicillins Nausea And Vomiting       REVIEW OF SYSTEMS  10 systems reviewed, pertinent positives per HPI otherwise noted to be negative. PHYSICAL EXAM  /77   Pulse 81   Temp 97.6 °F (36.4 °C) (Oral)   Resp 10   Ht 5' 10\" (1.778 m)   Wt 210 lb (95.3 kg)   SpO2 98%   BMI 30.13 kg/m²    GENERAL APPEARANCE: Awake and alert. Cooperative. No acute distress. HENT: Normocephalic. Atraumatic. Mucous membranes are moist.  No drooling or stridor. NECK: Supple. EYES: PERRL. EOM's grossly intact. HEART/CHEST: RRR. No murmurs. LUNGS: Respirations unlabored. CTAB. Good air exchange. Speaking comfortably in full sentences. ABDOMEN: No tenderness. Soft. Non-distended. No masses. No organomegaly. No guarding or rebound. MUSCULOSKELETAL: No extremity edema. Globulin 3.0 g/dL   Urinalysis Reflex to Culture   Result Value Ref Range    Color, UA Yellow Straw/Yellow    Clarity, UA Clear Clear    Glucose, Ur Negative Negative mg/dL    Bilirubin Urine Negative Negative    Ketones, Urine Negative Negative mg/dL    Specific Gravity, UA 1.025 1.005 - 1.030    Blood, Urine Negative Negative    pH, UA 5.5 5.0 - 8.0    Protein, UA Negative Negative mg/dL    Urobilinogen, Urine 0.2 <2.0 E.U./dL    Nitrite, Urine Negative Negative    Leukocyte Esterase, Urine Negative Negative    Microscopic Examination Not Indicated     Urine Type NotGiven     Urine Reflex to Culture Not Indicated        RADIOLOGY  Ct Abdomen Pelvis Wo Contrast Additional Contrast? None    Result Date: 2/15/2020  EXAMINATION: CT OF THE ABDOMEN AND PELVIS WITHOUT CONTRAST 2/15/2020 8:29 am TECHNIQUE: CT of the abdomen and pelvis was performed without the administration of intravenous contrast. Multiplanar reformatted images are provided for review. Dose modulation, iterative reconstruction, and/or weight based adjustment of the mA/kV was utilized to reduce the radiation dose to as low as reasonably achievable. COMPARISON: 08/15/2018, 02/11/2018 HISTORY: ORDERING SYSTEM PROVIDED HISTORY: left flank pain, hx stones TECHNOLOGIST PROVIDED HISTORY: Additional Contrast?->None Reason for exam:->left flank pain, hx stones Reason for Exam: left flank pain Relevant Medical/Surgical History: hx of kidney  stones FINDINGS: Lower Chest: Bibasilar atelectasis. Organs: Lack of IV and oral contrast limits sensitivity for visceral organ pathology. Bilateral nonobstructing renal calculi are seen. No hydronephrosis. 1.2 x 1.0 cm hyperdense lesion at the upper pole of the right kidney laterally again demonstrated, nonspecific. No ureteral calculus. Liver is fatty. Gallbladder is grossly unremarkable. Spleen is within normal limits.   1.4 cm possible diverticulum along the medial margin of stomach, fluid-filled, similar to prior

## 2020-02-17 ENCOUNTER — OFFICE VISIT (OUTPATIENT)
Dept: INTERNAL MEDICINE CLINIC | Age: 65
End: 2020-02-17
Payer: COMMERCIAL

## 2020-02-17 VITALS
BODY MASS INDEX: 29.7 KG/M2 | SYSTOLIC BLOOD PRESSURE: 128 MMHG | DIASTOLIC BLOOD PRESSURE: 66 MMHG | WEIGHT: 207 LBS | HEART RATE: 67 BPM | OXYGEN SATURATION: 98 %

## 2020-02-17 PROCEDURE — 99213 OFFICE O/P EST LOW 20 MIN: CPT | Performed by: NURSE PRACTITIONER

## 2020-02-17 RX ORDER — PREDNISONE 20 MG/1
TABLET ORAL
Qty: 19 TABLET | Refills: 0 | Status: SHIPPED | OUTPATIENT
Start: 2020-02-17 | End: 2020-03-09

## 2020-02-17 ASSESSMENT — ENCOUNTER SYMPTOMS
BACK PAIN: 1
VOMITING: 0
NAUSEA: 0
SINUS PRESSURE: 0
DIARRHEA: 0
FACIAL SWELLING: 0
COUGH: 1
SORE THROAT: 0

## 2020-02-17 NOTE — LETTER
[facility] [address] [facility]  Phone: [phone]  Fax: [phone]    NIC Hardy CNP        February 17, 2020     Patient: Nolan Edmonds [facility]   YOB: 1955   Date of Visit: 2/17/2020       To Whom it May Concern:    Romana Poll was seen in my clinic on 2/17/2020. He may return to work on 2/19/2020. If you have any questions or concerns, please don't hesitate to call.     Sincerely,         NIC Hardy CNP

## 2020-02-17 NOTE — PATIENT INSTRUCTIONS
Work on increasing stretching (hamstrings)    Core work out to prevent in future    Use prednisone and tylenol but if you need ibuprofen once in awhile over the next few days that is ok.      Robitussin for cough

## 2020-02-17 NOTE — PROGRESS NOTES
HENT: Positive for congestion. Negative for ear discharge, ear pain, facial swelling, hearing loss, sinus pressure, sneezing and sore throat. Respiratory: Positive for cough. Cardiovascular: Negative for chest pain. Gastrointestinal: Negative for diarrhea, nausea and vomiting. Genitourinary: Negative for difficulty urinating, dysuria, hematuria and urgency. Musculoskeletal: Positive for back pain and gait problem. Negative for arthralgias. Neurological: Negative for dizziness, weakness and headaches. Hematological: Negative for adenopathy. Psychiatric/Behavioral: Negative for sleep disturbance and suicidal ideas. Physical Exam  Constitutional:       Appearance: Normal appearance. He is normal weight. HENT:      Head: Normocephalic. Right Ear: Tympanic membrane, ear canal and external ear normal.      Left Ear: Tympanic membrane, ear canal and external ear normal.      Nose: Nose normal.      Mouth/Throat:      Mouth: Mucous membranes are moist.      Pharynx: Oropharynx is clear. Eyes:      Conjunctiva/sclera: Conjunctivae normal.      Pupils: Pupils are equal, round, and reactive to light. Cardiovascular:      Rate and Rhythm: Normal rate and regular rhythm. Pulses: Normal pulses. Heart sounds: Normal heart sounds. Pulmonary:      Effort: Pulmonary effort is normal.      Breath sounds: Normal breath sounds. Abdominal:      General: Bowel sounds are normal.      Tenderness: There is no abdominal tenderness. Musculoskeletal:      Right lower leg: No edema. Left lower leg: No edema. Comments: Tenderness over L SI joint. Pain on flexion and extension. FROM but with pain   Lymphadenopathy:      Cervical: No cervical adenopathy. Neurological:      General: No focal deficit present. Mental Status: He is alert and oriented to person, place, and time. Psychiatric:         Mood and Affect: Mood normal.         Thought Content:  Thought content normal.

## 2020-03-09 ENCOUNTER — OFFICE VISIT (OUTPATIENT)
Dept: INTERNAL MEDICINE CLINIC | Age: 65
End: 2020-03-09
Payer: COMMERCIAL

## 2020-03-09 VITALS
HEART RATE: 75 BPM | SYSTOLIC BLOOD PRESSURE: 120 MMHG | OXYGEN SATURATION: 96 % | RESPIRATION RATE: 16 BRPM | DIASTOLIC BLOOD PRESSURE: 78 MMHG | BODY MASS INDEX: 30.64 KG/M2 | WEIGHT: 214 LBS | TEMPERATURE: 98 F | HEIGHT: 70 IN

## 2020-03-09 LAB
BILIRUBIN, POC: NORMAL
BLOOD URINE, POC: NORMAL
CLARITY, POC: NORMAL
COLOR, POC: NORMAL
GLUCOSE URINE, POC: NORMAL
KETONES, POC: NORMAL
LEUKOCYTE EST, POC: NORMAL
NITRITE, POC: NORMAL
PH, POC: 5
PROTEIN, POC: NORMAL
SPECIFIC GRAVITY, POC: 1.03
UROBILINOGEN, POC: 0.2

## 2020-03-09 PROCEDURE — 81002 URINALYSIS NONAUTO W/O SCOPE: CPT | Performed by: NURSE PRACTITIONER

## 2020-03-09 PROCEDURE — 99213 OFFICE O/P EST LOW 20 MIN: CPT | Performed by: NURSE PRACTITIONER

## 2020-03-09 ASSESSMENT — ENCOUNTER SYMPTOMS
FACIAL SWELLING: 0
VOMITING: 0
SORE THROAT: 0
BACK PAIN: 1
DIARRHEA: 0
NAUSEA: 0
SINUS PRESSURE: 0
COUGH: 0

## 2020-03-09 NOTE — LETTER
[facility] [address]anna [address] [facility]  Phone: [phone]  Fax: [phone]    NIC Samaniego CNP        March 9, 2020     Patient: Junior Delgadillo [facility]   YOB: 1955   Date of Visit: 3/9/2020       To Whom it May Concern:    Afia Nolasco was seen in my clinic on 3/9/2020. It is not recommended that the patient perform jury duty at this time as he is not physically able to sit for long periods of time without interruption. He suffers from frequent urination s/t kidney stones and enlarged prostate. If you have any questions or concerns, please don't hesitate to call.     Sincerely,         NIC Samaniego CNP

## 2020-03-09 NOTE — PROGRESS NOTES
[facility]  1955      HPI:  Chief Complaint   Patient presents with    Back Pain     lower slight to left       L lower back pain. He has improved but not continuing to improve (Stable for 1 week). No radiation into L leg. No numbness or tingling. Exercise / stretching. No gross blood in urine. Stream is normal, intermittent flow. Hydrocodone 1/2 pill twice a day. /78 (Site: Left Upper Arm, Position: Sitting)   Pulse 75   Temp 98 °F (36.7 °C) (Oral)   Resp 16   Ht 5' 10\" (1.778 m)   Wt 214 lb (97.1 kg)   SpO2 96%   BMI 30.71 kg/m²     Prior to Visit Medications    Medication Sig Taking? Authorizing Provider   methocarbamol (ROBAXIN) 500 MG tablet Take 1 tablet by mouth 4 times daily as needed (muscle spasms) Yes Nohemy Crystal MD   diclofenac (VOLTAREN) 75 MG EC tablet Take 1 tablet by mouth 2 times daily (with meals) Yes Vicky Johnson MD   pravastatin (PRAVACHOL) 80 MG tablet TAKE ONE TABLET BY MOUTH EVERY EVENING FOR HIGH CHOLESTEROL Yes Inessa Gao MD   clobetasol (OLUX) 0.05 % foam  Yes Historical Provider, MD   econazole nitrate 1 % cream  Yes Historical Provider, MD   multivitamin [facility]) per tablet Take 1 tablet by mouth daily.    Yes Historical Provider, MD   tamsulosin (FLOMAX) 0.4 MG capsule Take 1 capsule by mouth daily  Patient not taking: Reported on 3/9/2020  NIC Leroy - CNP     Family History   Problem Relation Age of Onset    Cancer Father         Stomach CA    Heart Disease Father 54        MI x 2     Social History     Socioeconomic History    Marital status:      Spouse name: Not on file    Number of children: Not on file    Years of education: Not on file    Highest education level: Not on file   Occupational History    Not on file   Social Needs    Financial resource strain: Not on file    Food insecurity     Worry: Not on file     Inability: Not on file    Transportation needs     Medical: Not on file     Non-medical: Not on file   Tobacco Use    Smoking status: Never Smoker    Smokeless tobacco: Current User   Substance and Sexual Activity    Alcohol use: Yes     Alcohol/week: 6.0 standard drinks     Types: 6 Cans of beer per week     Comment: 10 - 15  BEERS A WK x 15 yrs then decreased 10 a month    Drug use: No    Sexual activity: Not on file   Lifestyle    Physical activity     Days per week: Not on file     Minutes per session: Not on file    Stress: Not on file   Relationships    Social connections     Talks on phone: Not on file     Gets together: Not on file     Attends Anabaptism service: Not on file     Active member of club or organization: Not on file     Attends meetings of clubs or organizations: Not on file     Relationship status: Not on file    Intimate partner violence     Fear of current or ex partner: Not on file     Emotionally abused: Not on file     Physically abused: Not on file     Forced sexual activity: Not on file   Other Topics Concern    Not on file   Social History Narrative    Not on file       Review of Systems   Constitutional: Negative for appetite change, chills, fatigue, fever and unexpected weight change. HENT: Negative for congestion, ear discharge, ear pain, facial swelling, hearing loss, sinus pressure, sneezing and sore throat. Respiratory: Negative for cough. Cardiovascular: Negative for chest pain. Gastrointestinal: Negative for diarrhea, nausea and vomiting. Genitourinary: Negative for difficulty urinating, dysuria, hematuria and urgency. Musculoskeletal: Positive for back pain (L lumbar) and gait problem. Negative for arthralgias. Neurological: Negative for dizziness, weakness and headaches. Hematological: Negative for adenopathy. Psychiatric/Behavioral: Negative for sleep disturbance and suicidal ideas. Physical Exam  HENT:      Head: Normocephalic. Cardiovascular:      Rate and Rhythm: Normal rate and regular rhythm.       Heart sounds: Normal heart

## 2020-03-12 ENCOUNTER — HOSPITAL ENCOUNTER (OUTPATIENT)
Dept: PHYSICAL THERAPY | Age: 65
Setting detail: THERAPIES SERIES
Discharge: HOME | End: 2020-03-12
Payer: COMMERCIAL

## 2020-03-12 PROCEDURE — 97161 PT EVAL LOW COMPLEX 20 MIN: CPT | Performed by: PHYSICAL THERAPIST

## 2020-03-12 PROCEDURE — 97110 THERAPEUTIC EXERCISES: CPT | Performed by: PHYSICAL THERAPIST

## 2020-03-12 PROCEDURE — 97140 MANUAL THERAPY 1/> REGIONS: CPT | Performed by: PHYSICAL THERAPIST

## 2020-03-12 NOTE — FLOWSHEET NOTE
[place] [facility] [facility],  [address] [place]  Phone: [phone]  Fax [phone]    Physical Therapy Treatment Note/ Progress Report:           Date:  3/12/2020    Patient Name:  Geovanna Condon    :  1955  MRN: [id number]  Restrictions/Precautions:    Medical/Treatment Diagnosis Information:  · Diagnosis: M54.32 (ICD-10-CM) - Sciatica of left side, S39.012D (ICD-10-CM) - Strain of lumbar region, subsequent encounter  Treatment Diagnosis: lumbar [id number] ICD-10-CM Diagnosis Code M54.5   , SIJ/pelvic component, L hip [id number] ICD-10-CM Diagnosis Code M25.652  ·    Insurance/Certification information:  PT Insurance Information: BCBS  Physician Information:  Referring Practitioner: NIC Tao CNP  Has the plan of care been signed (Y/N):        []  Yes  [x]  No     Date of Patient follow up with Physician:       Is this a Progress Report:     []  Yes  [x]  No        If Yes:  Date Range for reporting period:  Beginning  Ending    Progress report will be due (10 Rx or 30 days whichever is less):        Recertification will be due (POC Duration  / 90 days whichever is less): 6 weeks       Visit # Insurance Allowable Requires auth   1 ?     []no        []yes:     Functional Scale: LEFS 58%    Date assessed:  3/12/20      Therapy Diagnosis/Practice Pattern:F      Number of Comorbidities:  []0     [x]1-2    []3+    Latex Allergy:  [x]NO      []YES  Preferred Language for Healthcare:   [x]English       []other:      Pain level:  4-6/10     SUBJECTIVE:  See eval    OBJECTIVE: See eval   Observation:    Test measurements:      RESTRICTIONS/PRECAUTIONS: OA    Exercises/Interventions:     Therapeutic Ex (33837) Sets/sec Reps Notes/CUES   Hand heel rock x6     Prone HF stretch with strap and GS :20x4     Supine HS stretch with strap :30x3                                                           Manual Intervention (11370)      Prone Lumbar PA's L1-S1 x6'  Gr 1   LLP L 5'     Supine long leg SIJ manip x5     Supine lumbo pelvic roll L X                 NMR re-education (93165)   CUES NEEDED                                                         Therapeutic Activity (61426)                                                Therapeutic Exercise and NMR EXR  [x] (79937) Provided verbal/tactile cueing for activities related to strengthening, flexibility, endurance, ROM for improvements in LE, proximal hip, and core control with self care, mobility, lifting, ambulation. [x] (78322) Provided verbal/tactile cueing for activities related to improving balance, coordination, kinesthetic sense, posture, motor skill, proprioception  to assist with LE, proximal hip, and core control in self care, mobility, lifting, ambulation and eccentric single leg control.      NMR and Therapeutic Activities:    [] (34231 or 07498) Provided verbal/tactile cueing for activities related to improving balance, coordination, kinesthetic sense, posture, motor skill, proprioception and motor activation to allow for proper function of core, proximal hip and LE with self care and ADLs  [] (39046) Gait Re-education- Provided training and instruction to the patient for proper LE, core and proximal hip recruitment and positioning and eccentric body weight control with ambulation re-education including up and down stairs     Home Exercise Program:    [x] (38056) Reviewed/Progressed HEP activities related to strengthening, flexibility, endurance, ROM of core, proximal hip and LE for functional self-care, mobility, lifting and ambulation/stair navigation   [] (02256)Reviewed/Progressed HEP activities related to improving balance, coordination, kinesthetic sense, posture, motor skill, proprioception of core, proximal hip and LE for self care, mobility, lifting, and ambulation/stair navigation      Manual Treatments:  PROM / STM / Oscillations-Mobs:  G-I, II, III, IV (PA's, Inf., Post.)  [x] (03757) Provided manual therapy to mobilize LE, proximal hip and/or LS spine soft tissue/joints for the purpose of modulating pain, promoting relaxation,  increasing ROM, reducing/eliminating soft tissue swelling/inflammation/restriction, improving soft tissue extensibility and allowing for proper ROM for normal function with self care, mobility, lifting and ambulation. Modalities:     [] GAME READY (VASO)- for significant edema, swelling, pain control. Charges:  Timed Code Treatment Minutes: 30   Total Treatment Minutes: 60     BWC time in/time out:   (and requires time in and out for each CPT code)    [x] EVAL (LOW) 94714 (typically 20 minutes face-to-face)  [] EVAL (MOD) 02082 (typically 30 minutes face-to-face)  [] EVAL (HIGH) 00165 (typically 45 minutes face-to-face)  [] RE-EVAL     [x] YB(00795) x 1    [] IONTO  [] NMR (76667) x     [] VASO  [x] Manual (43340) x 1     [] Other:  [] TA x      [] Mech Traction (01131)  [] ES(attended) (88340)      [] ES (un) (60316):       GOALS:   Patient stated goal: Return to working out and golf, pain free with PLOF  [] Progressing: [] Met: [] Not Met: [] Adjusted    Therapist goals for Patient:   Short Term Goals: To be achieved in: 2 weeks  1. Independent in HEP and progression per patient tolerance, in order to prevent re-injury. [] Progressing: [] Met: [] Not Met: [] Adjusted  2. Patient will have a decrease in pain to facilitate improvement in movement, function, and ADLs as indicated by Functional Deficits. [] Progressing: [] Met: [] Not Met: [] Adjusted      Long Term Goals: To be achieved in: 6 weeks  1. Disability index score of 29% or less for the LEFS  to assist with reaching prior level of function. [] Progressing: [] Met: [] Not Met: [] Adjusted  2. Patient will demonstrate increased AROM to WNL, good LS mobility, good hip ROM to allow for proper joint functioning as indicated by patients Functional Deficits.    [] Progressing: [] Met: [] Not Met: [] Adjusted  3. Patient will demonstrate an increase in Strength to good proximal hip and core activation to allow for proper functional mobility as indicated by patients Functional Deficits. [] Progressing: [] Met: [] Not Met: [] Adjusted  4. Patient will return to bending and lifting functional activities without increased symptoms or restriction. [] Progressing: [] Met: [] Not Met: [] Adjusted  5. Pt. To be able to return to golf without limitation(patient specific functional goal)    [] Progressing: [] Met: [] Not Met: [] Adjusted       Progression Towards Functional goals:  [] Patient is progressing as expected towards functional goals listed. [] Progression is slowed due to complexities listed. [] Progression has been slowed due to co-morbidities. [x] Plan just implemented, too soon to assess goals progression  [] Other:         Overall Progression Towards Functional goals/ Treatment Progress Update:  [] Patient is progressing as expected towards functional goals listed. [] Progression is slowed due to complexities/Impairments listed. [] Progression has been slowed due to co-morbidities.   [x] Plan just implemented, too soon to assess goals progression <30days   [] Goals require adjustment due to lack of progress  [] Patient is not progressing as expected and requires additional follow up with physician  [] Other    Prognosis for POC: [x] Good [] Fair  [] Poor      Patient requires continued skilled intervention: [x] Yes  [] No    Treatment/Activity Tolerance:  [x] Patient able to complete treatment  [] Patient limited by fatigue  [] Patient limited by pain    [] Patient limited by other medical complications  [] Other:     ASSESSMENT: see eval    Return to Play: (if applicable)   []  Stage 1: Intro to Strength   []  Stage 2: Return to Run and Strength   []  Stage 3: Return to Jump and Strength   []  Stage 4: Dynamic Strength and Agility   []  Stage 5: Sport Specific Training     []  Ready to Return to Play, Meets All Above Stages   []  Not Ready for Return to Sports   Comments:                               PLAN: See eval  [] Continue per plan of care [] Alter current plan (see comments above)  [x] Plan of care initiated [] Hold pending MD visit [] Discharge      Electronically signed by:  González Gay PT, MSPT, OMT-C [id number]    Note: If patient does not return for scheduled/ recommended follow up visits, this note will serve as a discharge from care along with most recent update on progress.

## 2020-03-12 NOTE — PLAN OF CARE
spring R, (+)lumbar spring L 1-S1, tight L HF                       [x] Patient history, allergies, meds reviewed. Medical chart reviewed. See intake form. Review Of Systems (ROS):  [x]Performed Review of systems (Integumentary, CardioPulmonary, Neurological) by intake and observation. Intake form has been scanned into medical record. Patient has been instructed to contact their primary care physician regarding ROS issues if not already being addressed at this time. Co-morbidities/Complexities (which will affect course of rehabilitation):   []None           Arthritic conditions   []Rheumatoid arthritis (M05.9)  [x]Osteoarthritis (M19.91)   Cardiovascular conditions   []Hypertension (I10)  []Hyperlipidemia (E78.5)  []Angina pectoris (I20)  []Atherosclerosis (I70)   Musculoskeletal conditions   []Disc pathology   []Congenital spine pathologies   []Prior surgical intervention  []Osteoporosis (M81.8)  []Osteopenia (M85.8)   Endocrine conditions   []Hypothyroid (E03.9)  []Hyperthyroid Gastrointestinal conditions   []Constipation (O72.15)   Metabolic conditions   []Morbid obesity (E66.01)  []Diabetes type 1(E10.65) or 2 (E11.65)   []Neuropathy (G60.9)     Pulmonary conditions   []Asthma (J45)  []Coughing   []COPD (J44.9)   Psychological Disorders  []Anxiety (F41.9)  []Depression (F32.9)   []Other:   [x]Other:   L achilles repair '89       Barriers to/and or personal factors that will affect rehab potential:              []Age  []Sex              []Motivation/Lack of Motivation                        [x]Co-Morbidities              []Cognitive Function, education/learning barriers              []Environmental, home barriers              []profession/work barriers  []past PT/medical experience  []other:  Justification: Comorbidities may effect response to treatment, however should respond well    Falls Risk Assessment (30 days):   [x] Falls Risk assessed and no intervention required.   [] Falls Risk assessed and Patient requires intervention due to being higher risk   TUG score (>12s at risk):     [] Falls education provided, including       G-Codes:       ASSESSMENT:   Functional Impairments:     [x]Noted lumbar/proximal hip hypomobility   []Noted lumbosacral and/or generalized hypermobility   [x]Decreased Lumbosacral/hip/LE functional ROM   [x]Decreased core/proximal hip strength and neuromuscular control    []Decreased LE functional strength    []Abnormal reflexes/sensation/myotomal/dermatomal deficits  []Reduced balance/proprioceptive control    []other:      Functional Activity Limitations (from functional questionnaire and intake)   [x]Reduced ability to tolerate prolonged functional positions   []Reduced ability or difficulty with changes of positions or transfers between positions   [x]Reduced ability to maintain good posture and demonstrate good body mechanics with sitting, bending, and lifting   [x]Reduced ability to sleep   [] Reduced ability or tolerance with driving and/or computer work   [x]Reduced ability to perform lifting, reaching, carrying tasks   []Reduced ability to squat   [x]Reduced ability to forward bend   [x]Reduced ability to ambulate prolonged functional periods/distances/surfaces   [x]Reduced ability to ascend/descend stairs   []other:       Participation Restrictions   [x]Reduced participation in self care activities   []Reduced participation in home management activities   []Reduced participation in work activities   [x]Reduced participation in social activities. [x]Reduced participation in sport/recreation activities. Classification:   []Signs/symptoms consistent with Lumbar instability/stabilization subgroup. [x]Signs/symptoms consistent with Lumbar mobilization/manipulation subgroup, myotomes and dermatomes intact. Meets manipulation criteria.     []Signs/symptoms consistent with Lumbar direction specific/centralization subgroup   []Signs/symptoms consistent with Lumbar traction subgroup     []Signs/symptoms consistent with lumbar facet dysfunction   []Signs/symptoms consistent with lumbar stenosis type dysfunction   []Signs/symptoms consistent with nerve root involvement including myotome & dermatome dysfunction   []Signs/symptoms consistent with post-surgical status including: decreased ROM, strength and function. []signs/symptoms consistent with pathology which may benefit from Dry needling     [x]other: pelvic vs. Sacral component    Prognosis/Rehab Potential:      []Excellent   [x]Good    []Fair   []Poor    Tolerance of evaluation/treatment:    []Excellent   [x]Good    []Fair   []Poor  Physical Therapy Evaluation Complexity Justification  [x] A history of present problem with:  [] no personal factors and/or comorbidities that impact the plan of care;  [x]1-2 personal factors and/or comorbidities that impact the plan of care  []3 personal factors and/or comorbidities that impact the plan of care  [x] An examination of body systems using standardized tests and measures addressing any of the following: body structures and functions (impairments), activity limitations, and/or participation restrictions;:  [] a total of 1-2 or more elements   [x] a total of 3 or more elements   [] a total of 4 or more elements   [x] A clinical presentation with:  [x] stable and/or uncomplicated characteristics   [] evolving clinical presentation with changing characteristics  [] unstable and unpredictable characteristics;   [x] Clinical decision making of [x] low, [] moderate, [] high complexity using standardized patient assessment instrument and/or measurable assessment of functional outcome.     [x] EVAL (LOW) 35994 (typically 20 minutes face-to-face)  [] EVAL (MOD) 33683 (typically 30 minutes face-to-face)  [] EVAL (HIGH) 29799 (typically 45 minutes face-to-face)  [] RE-EVAL         PLAN: Begin PT focusing on: proximal hip mobilizations, LB mobs, LB core activation, proximal hip activation, and Progressing: [] Met: [] Not Met: [] Adjusted  5. Pt.  To be able to return to golf without limitation(patient specific functional goal)    [] Progressing: [] Met: [] Not Met: [] Adjusted       Electronically signed by:  Sally Funes, PT, [address] [place] [address], T-C [id number]

## 2020-03-13 ENCOUNTER — TELEPHONE (OUTPATIENT)
Dept: INTERNAL MEDICINE CLINIC | Age: 65
End: 2020-03-13

## 2020-03-13 RX ORDER — HYDROCODONE BITARTRATE AND ACETAMINOPHEN 5; 325 MG/1; MG/1
1 TABLET ORAL EVERY 6 HOURS PRN
Qty: 28 TABLET | Refills: 0 | Status: SHIPPED | OUTPATIENT
Start: 2020-03-13 | End: 2020-03-20

## 2020-03-16 NOTE — TELEPHONE ENCOUNTER
Pt states that back pain is improving. He is performing PT. He is out of Norco which he has used as needed 1/2 pill twice daily. Needs refills. Sent rx.

## 2020-03-23 ENCOUNTER — HOSPITAL ENCOUNTER (OUTPATIENT)
Dept: PHYSICAL THERAPY | Age: 65
Setting detail: THERAPIES SERIES
Discharge: HOME | End: 2020-03-23
Payer: COMMERCIAL

## 2020-03-25 PROBLEM — E78.5 HYPERLIPIDEMIA: Status: RESOLVED | Noted: 2020-03-25 | Resolved: 2020-03-24

## 2020-05-15 ENCOUNTER — APPOINTMENT (OUTPATIENT)
Dept: PHYSICAL THERAPY | Age: 65
End: 2020-05-15
Payer: COMMERCIAL

## 2020-05-18 ENCOUNTER — HOSPITAL ENCOUNTER (OUTPATIENT)
Dept: PHYSICAL THERAPY | Age: 65
Setting detail: THERAPIES SERIES
Discharge: HOME | End: 2020-05-18
Payer: COMMERCIAL

## 2020-05-18 PROCEDURE — 97140 MANUAL THERAPY 1/> REGIONS: CPT

## 2020-05-18 PROCEDURE — 97110 THERAPEUTIC EXERCISES: CPT

## 2020-05-18 PROCEDURE — 97164 PT RE-EVAL EST PLAN CARE: CPT

## 2020-05-18 NOTE — PLAN OF CARE
[]? Adjusted  5. Pt. To be able to return to golf without limitation(patient specific functional goal)    []? Progressing: []? Met: []? Not Met: []? Adjusted         Rehab Potential:   []Excellent   [x] Good   [] Fair   [] Poor    Plan of Care:  [x] Continue Current Therapy Intervention  Frequency/Duration:  1-2 days per week for 6 Weeks:  HEP instruction: See media  1. Therapeutic exercsise including: strength training, ROM, NMR and proprioception for the proximal hip, core and Lower extremity  2. Manual therapy as indicated including Dry Needling/IASTM, STM, PROM, Gr I-IV mobilizations, spinal mobilization/manipulation. 3. Modalities as needed including: thermal agents, E-stim, US, iontophoresis as indicated. 4. Patient education on spine protection, activity modification, progression of HEP.         Electronically signed by:  Liz Woodward, PT,DPT [id number]

## 2020-05-18 NOTE — FLOWSHEET NOTE
[facility],  [address]  Phone: [phone]  Fax [phone]    Physical Therapy Treatment Note/ Progress Report:           Date:  2020    Patient Name:  Ki Isaac   \"Luis Miguel\"   :  1955  MRN: [id number]  Restrictions/Precautions:    Medical/Treatment Diagnosis Information:  · Diagnosis: M54.32 (ICD-10-CM) - Sciatica of left side, S39.012D (ICD-10-CM) - Strain of lumbar region, subsequent encounter  · Treatment Diagnosis: lumbar pain (M54.5) weakness (R53.1), Abnormal posture (R29.3), right hip pain (R73.549)  Insurance/Certification information:  PT Insurance Information: [employer]  Physician Information:  Referring Practitioner: NIC Dalton CNP  Has the plan of care been signed (Y/N):        []  Yes  [x]  No     Date of Patient follow up with Physician: none scheduled      Is this a Progress Report:     []  Yes  [x]  No        If Yes:  Date Range for reporting period:  Beginnin20  Ending    Progress report will be due (10 Rx or 30 days whichever is less):        Recertification will be due (POC Duration  / 90 days whichever is less): 20         Visit # Insurance Allowable Auth Required   1 30 (1 used) []  Yes [x]  No        Functional Scale: Modified oswestry 2250 (44%)   Date assessed: 20     Therapy Diagnosis/Practice Pattern:F     Number of Comorbidities:  []0     [x]1-2    []3+    Latex Allergy:  [x]NO      []YES  Preferred Language for Healthcare:   [x]English       []other:      Pain level:  3-8/10    SUBJECTIVE:  See eval    OBJECTIVE: See eval   Observation:    Test measurements:      RESTRICTIONS/PRECAUTIONS: none    Exercises/Interventions:     Therapeutic Ex (31027) Sets/sec Reps Notes/CUES   SB roll in  10\" 10    Child's pose 3D 10\" 10x fwd  3x ea side +HEP   HS stretch B 30\" 3 HEP   LTR 5\" 20x HEP   Fig 4  30\" 3 HEP

## 2020-05-21 ENCOUNTER — HOSPITAL ENCOUNTER (OUTPATIENT)
Dept: PHYSICAL THERAPY | Age: 65
Setting detail: THERAPIES SERIES
Discharge: HOME | End: 2020-05-21
Payer: COMMERCIAL

## 2020-05-28 ENCOUNTER — HOSPITAL ENCOUNTER (OUTPATIENT)
Dept: PHYSICAL THERAPY | Age: 65
Setting detail: THERAPIES SERIES
Discharge: HOME | End: 2020-05-28
Payer: COMMERCIAL

## 2020-05-28 ENCOUNTER — HOSPITAL ENCOUNTER (OUTPATIENT)
Age: 65
Discharge: HOME | End: 2020-05-28
Payer: COMMERCIAL

## 2020-05-28 LAB
ALBUMIN SERPL-MCNC: 4.2 G/DL (ref 3.4–5)
ALP BLD-CCNC: 69 U/L (ref 40–129)
ALT SERPL-CCNC: 34 U/L (ref 10–40)
AST SERPL-CCNC: 47 U/L (ref 15–37)
BILIRUB SERPL-MCNC: 0.6 MG/DL (ref 0–1)
BILIRUBIN DIRECT: <0.2 MG/DL (ref 0–0.3)
BILIRUBIN, INDIRECT: ABNORMAL MG/DL (ref 0–1)
TOTAL PROTEIN: 6.8 G/DL (ref 6.4–8.2)

## 2020-05-28 PROCEDURE — 97110 THERAPEUTIC EXERCISES: CPT

## 2020-05-28 PROCEDURE — 97140 MANUAL THERAPY 1/> REGIONS: CPT

## 2020-05-28 PROCEDURE — 80076 HEPATIC FUNCTION PANEL: CPT

## 2020-05-28 PROCEDURE — 36415 COLL VENOUS BLD VENIPUNCTURE: CPT

## 2020-05-28 NOTE — FLOWSHEET NOTE
[facility],  [address] [place]  Phone: [phone]  Fax [phone]    Physical Therapy Treatment Note/ Progress Report:           Date:  2020    Patient Name:  Lazarus Moritz   \"Luis Miguel\"   :  1955  MRN: [id number]  Restrictions/Precautions:    Medical/Treatment Diagnosis Information:  · Diagnosis: M54.32 (ICD-10-CM) - Sciatica of left side, S39.012D (ICD-10-CM) - Strain of lumbar region, subsequent encounter  · Treatment Diagnosis: lumbar pain (M54.5) weakness (R53.1), Abnormal posture (R29.3), right hip pain (E61.756)  Insurance/Certification information:  PT Insurance Information: [employer]  Physician Information:  Referring Practitioner: NIC Galloway CNP  Has the plan of care been signed (Y/N):        []  Yes  [x]  No     Date of Patient follow up with Physician: none scheduled      Is this a Progress Report:     []  Yes  [x]  No        If Yes:  Date Range for reporting period:  Beginnin20  Ending    Progress report will be due (10 Rx or 30 days whichever is less):        Recertification will be due (POC Duration  / 90 days whichever is less): 20         Visit # Insurance Allowable Auth Required   2 30 (1 used) []  Yes [x]  No        Functional Scale: Modified oswestry 22/50 (44%)   Date assessed: 20     Therapy Diagnosis/Practice Pattern:F     Number of Comorbidities:  []0     [x]1-2    []3+    Latex Allergy:  [x]NO      []YES  Preferred Language for Healthcare:   [x]English       []other:      Pain level:  4-5/10    SUBJECTIVE:  Pt states LTR irritates his back. Went to the [employer] yesterday and did the elliptical for 40 minutes so he is pretty sore.     OBJECTIVE:   Observation:    Test measurements:      RESTRICTIONS/PRECAUTIONS: none    Exercises/Interventions:     Therapeutic Ex (59185) Sets/sec Reps Notes/CUES   SB roll in  10\" 10    Child's pose 3D 10\" 10x fwd  3x ea side +HEP   HS stretch B 30\" 3 HEP   HEP   Fig 4  30\" 3 HEP         Prone TA w/ glute set 5\" 15x    Prone TA w/ SLR ext alt 3\" 10x R/L    Prone prop 10\" 10x          Bike L3 5'  For ROM   Pt ed 6'  Not pushing with exercises, only 15-20 min light resistance on elliptical   Manual Intervention (31324)      STM lumbar region   SIJ distraction  Lumbar traction manual  Long axis distraction LLE  STM glutes/piriformis 20'     SIJ assessment 3'  L upslip                           NMR re-education (40943)   CUES NEEDED                                                         Therapeutic Activity (53083)                                              Therapeutic Exercise and NMR EXR  [x] (96031) Provided verbal/tactile cueing for activities related to strengthening, flexibility, endurance, ROM  for improvements in proximal hip and core control with self care, mobility, lifting and ambulation.  [] (33631) Provided verbal/tactile cueing for activities related to improving balance, coordination, kinesthetic sense, posture, motor skill, proprioception  to assist with core control in self care, mobility, lifting, and ambulation.      Therapeutic Activities:    [] (16366 or 74470) Provided verbal/tactile cueing for activities related to improving balance, coordination, kinesthetic sense, posture, motor skill, proprioception and motor activation to allow for proper function  with self care and ADLs  [] (83847) Provided training and instruction to the patient for proper core and proximal hip recruitment and positioning with ambulation re-education     Home Exercise Program:    [x] (70095) Reviewed/Progressed HEP activities related to strengthening, flexibility, endurance, ROM of core, proximal hip and LE for functional self-care, mobility, lifting and ambulation   [] (08630) Reviewed/Progressed HEP activities related to improving balance, coordination, kinesthetic sense, posture, motor skill, proprioception of core, proximal Functional Deficits. []? Progressing: []? Met: []? Not Met: []? Adjusted  3. Patient will demonstrate an increase in Strength to grossly 4+/5 R hip and good core activation to allow for proper functional mobility as indicated by patients Functional Deficits. []? Progressing: []? Met: []? Not Met: []? Adjusted  4. Patient will return to bending and lifting functional activities without increased symptoms or restriction. []? Progressing: []? Met: []? Not Met: []? Adjusted  5. Pt. To be able to return to golf without limitation(patient specific functional goal)    []? Progressing: []? Met: []? Not Met: []? Adjusted    Progression Towards Functional goals:  [] Patient is progressing as expected towards functional goals listed. [x] Progression is slowed due to complexities listed. [] Progression has been slowed due to co-morbidities. [] Plan just implemented, too soon to assess goals progression  [] Other:     Overall Progression Towards Functional goals/ Treatment Progress Update:  [] Patient is progressing as expected towards functional goals listed. [] Progression is slowed due to complexities/Impairments listed. [] Progression has been slowed due to co-morbidities. [x] Plan just implemented, too soon to assess goals progression <30days   [] Goals require adjustment due to lack of progress  [] Patient is not progressing as expected and requires additional follow up with physician  [] Other    Prognosis for POC: [x] Good [] Fair  [] Poor      Patient requires continued skilled intervention: [x] Yes  [] No    Treatment/Activity Tolerance:  [x] Patient able to complete treatment  [] Patient limited by fatigue  [] Patient limited by pain    [] Patient limited by other medical complications  [] Other:     ASSESSMENT: Pt continuously pushes himself too far with work at home, at the gym and exercises.   Again was heavily educated that he needs to modify his activity level to gentle stretching and bump back to only 15-20 min on elliptical.      PLAN: Add to HEP  [x] Continue per plan of care [] Alter current plan (see comments above)  [] Plan of care initiated [] Hold pending MD visit [] Discharge      Electronically signed by:  Vineet Senior, PT,DPT [id number]    Note: If patient does not return for scheduled/ recommended follow up visits, this note will serve as a discharge from care along with most recent update on progress.

## 2020-06-01 ENCOUNTER — HOSPITAL ENCOUNTER (OUTPATIENT)
Dept: PHYSICAL THERAPY | Age: 65
Setting detail: THERAPIES SERIES
Discharge: HOME | End: 2020-06-01
Payer: COMMERCIAL

## 2020-06-04 ENCOUNTER — HOSPITAL ENCOUNTER (OUTPATIENT)
Dept: PHYSICAL THERAPY | Age: 65
Setting detail: THERAPIES SERIES
Discharge: HOME | End: 2020-06-04
Payer: COMMERCIAL

## 2020-06-04 PROCEDURE — 97140 MANUAL THERAPY 1/> REGIONS: CPT

## 2020-06-04 PROCEDURE — 97110 THERAPEUTIC EXERCISES: CPT

## 2020-06-08 ENCOUNTER — HOSPITAL ENCOUNTER (OUTPATIENT)
Dept: PHYSICAL THERAPY | Age: 65
Setting detail: THERAPIES SERIES
Discharge: HOME | End: 2020-06-08
Payer: COMMERCIAL

## 2020-06-08 PROCEDURE — 97110 THERAPEUTIC EXERCISES: CPT

## 2020-06-08 PROCEDURE — 97140 MANUAL THERAPY 1/> REGIONS: CPT

## 2020-06-08 NOTE — FLOWSHEET NOTE
mobility   [x] Continue per plan of care [] Alter current plan (see comments above)  [] Plan of care initiated [] Hold pending MD visit [] Discharge      Electronically signed by:  Luigi Dobson PT,DPT [id number]    Note: If patient does not return for scheduled/ recommended follow up visits, this note will serve as a discharge from care along with most recent update on progress.

## 2020-06-11 ENCOUNTER — HOSPITAL ENCOUNTER (OUTPATIENT)
Dept: PHYSICAL THERAPY | Age: 65
Setting detail: THERAPIES SERIES
Discharge: HOME | End: 2020-06-11
Payer: COMMERCIAL

## 2020-06-16 ENCOUNTER — HOSPITAL ENCOUNTER (OUTPATIENT)
Dept: PHYSICAL THERAPY | Age: 65
Setting detail: THERAPIES SERIES
Discharge: HOME | End: 2020-06-16
Payer: COMMERCIAL

## 2020-06-16 PROCEDURE — 97140 MANUAL THERAPY 1/> REGIONS: CPT

## 2020-06-16 PROCEDURE — 97110 THERAPEUTIC EXERCISES: CPT

## 2020-06-16 PROCEDURE — 97112 NEUROMUSCULAR REEDUCATION: CPT

## 2020-06-16 NOTE — FLOWSHEET NOTE
[facility],  [address]  Phone: [phone]  Fax [phone]    Physical Therapy Treatment Note/ Progress Report:           Date:  2020    Patient Name:  Arsenio High   \"Luis Miguel\"   :  1955  MRN: [id number]  Restrictions/Precautions:    Medical/Treatment Diagnosis Information:  · Diagnosis: M54.32 (ICD-10-CM) - Sciatica of left side, S39.012D (ICD-10-CM) - Strain of lumbar region, subsequent encounter  · Treatment Diagnosis: lumbar pain (M54.5) weakness (R53.1), Abnormal posture (R29.3), right hip pain (S99.448)  Insurance/Certification information:  PT Insurance Information: [employer] 150  Physician Information:  Referring Practitioner: NIC Tinoco CNP  Has the plan of care been signed (Y/N):        []  Yes  [x]  No     Date of Patient follow up with Physician: none scheduled      Is this a Progress Report:     []  Yes  [x]  No        If Yes:  Date Range for reporting period:  Beginnin20  Ending    Progress report will be due (10 Rx or 30 days whichever is less): 85       Recertification will be due (POC Duration  / 90 days whichever is less): 20         Visit # Insurance Allowable Auth Required   5 30 (1 used) []  Yes [x]  No        Functional Scale: Modified oswestry 22/50 (44%)   Date assessed: 20     Therapy Diagnosis/Practice Pattern:F     Number of Comorbidities:  []0     [x]1-2    []3+    Latex Allergy:  [x]NO      []YES  Preferred Language for Healthcare:   [x]English       []other:      Pain level:  2/10    SUBJECTIVE:  Pt reports he still is having incidents of locking after sitting unsupported ~15 minutes. Has very sharp pain on the right side of his back.      OBJECTIVE:   Observation:    Test measurements:      RESTRICTIONS/PRECAUTIONS: none    Exercises/Interventions:     Therapeutic Ex (44696) Sets/sec Reps Notes/CUES   SB roll in  10\" 10    Child's pose  30\" 3x lumbar spine and his locking. Pt was able to tolerate exercises on swiss ball but did have difficulty returning to an upright position after 3 exercises in that position. PLAN: core/TA work   [x] Continue per plan of care [] Alter current plan (see comments above)  [] Plan of care initiated [] Hold pending MD visit [] Discharge      Electronically signed by:  Benito Ambrosio, PT,DPT [id number]    Note: If patient does not return for scheduled/ recommended follow up visits, this note will serve as a discharge from care along with most recent update on progress.

## 2020-06-18 ENCOUNTER — HOSPITAL ENCOUNTER (OUTPATIENT)
Dept: PHYSICAL THERAPY | Age: 65
Setting detail: THERAPIES SERIES
Discharge: HOME | End: 2020-06-18
Payer: COMMERCIAL

## 2020-06-18 PROCEDURE — 97140 MANUAL THERAPY 1/> REGIONS: CPT

## 2020-06-18 PROCEDURE — 97110 THERAPEUTIC EXERCISES: CPT

## 2020-06-18 NOTE — FLOWSHEET NOTE
will demonstrate an increase in Strength to grossly 4+/5 R hip and good core activation to allow for proper functional mobility as indicated by patients Functional Deficits. []? Progressing: []? Met: []? Not Met: []? Adjusted  4. Patient will return to bending and lifting functional activities without increased symptoms or restriction. []? Progressing: []? Met: []? Not Met: []? Adjusted  5. Pt. To be able to return to golf without limitation(patient specific functional goal)    []? Progressing: []? Met: []? Not Met: []? Adjusted    Progression Towards Functional goals:  [] Patient is progressing as expected towards functional goals listed. [x] Progression is slowed due to complexities listed. [] Progression has been slowed due to co-morbidities. [] Plan just implemented, too soon to assess goals progression  [] Other:     Overall Progression Towards Functional goals/ Treatment Progress Update:  [] Patient is progressing as expected towards functional goals listed. [] Progression is slowed due to complexities/Impairments listed. [] Progression has been slowed due to co-morbidities. [x] Plan just implemented, too soon to assess goals progression <30days   [] Goals require adjustment due to lack of progress  [] Patient is not progressing as expected and requires additional follow up with physician  [] Other    Prognosis for POC: [x] Good [] Fair  [] Poor      Patient requires continued skilled intervention: [x] Yes  [] No    Treatment/Activity Tolerance:  [x] Patient able to complete treatment  [] Patient limited by fatigue  [] Patient limited by pain    [] Patient limited by other medical complications  [] Other:     ASSESSMENT: Pt did well today but was limited in TE progression d/t time restraint. Pt was able to transition from sitting on the swiss ball to standing with much less difficulty and no locking today.         PLAN: core/TA work   [x] Continue per plan of care [] Alter current plan (see comments above)  [] Plan of care initiated [] Hold pending MD visit [] Discharge      Electronically signed by:  [address], PT,DPT [id number]    Note: If patient does not return for scheduled/ recommended follow up visits, this note will serve as a discharge from care along with most recent update on progress.

## 2020-06-23 ENCOUNTER — HOSPITAL ENCOUNTER (OUTPATIENT)
Dept: PHYSICAL THERAPY | Age: 65
Setting detail: THERAPIES SERIES
Discharge: HOME | End: 2020-06-23
Payer: COMMERCIAL

## 2020-06-23 PROCEDURE — 97112 NEUROMUSCULAR REEDUCATION: CPT

## 2020-06-23 PROCEDURE — 97110 THERAPEUTIC EXERCISES: CPT

## 2020-06-23 PROCEDURE — 97140 MANUAL THERAPY 1/> REGIONS: CPT

## 2020-06-23 NOTE — FLOWSHEET NOTE
none    Exercises/Interventions:     Therapeutic Ex (33039) Sets/sec Reps Notes/CUES   SB roll in  10\" 10    Child's pose  30\" 3x +HEP   HS stretch seated EOT B 30\" 3 HEP-stiff/sore when standing after this exercise   HS contract relax stretch w/ black heavy TB 5x ea     HEP   HEP   Cat cow 5\" 10x ea Tactile assist for form   SLR abd 15x R/L  Breaks at 8       Prone prop 10\" 10x    Prone SLR ext  15x alt    Back against wall          Bike L4 5'           Manual Intervention (72413)      STM lumbar region/lower tpsine  Gentle G1 PA mobs L spine  STM glutes 13     \                              NMR re-education (15242)   CUES NEEDED   HL TA w/ ADD 5\" 15x              Cues for TA                     Therapeutic Activity (63920)                                              Therapeutic Exercise and NMR EXR  [x] (52811) Provided verbal/tactile cueing for activities related to strengthening, flexibility, endurance, ROM  for improvements in proximal hip and core control with self care, mobility, lifting and ambulation.  [] (39465) Provided verbal/tactile cueing for activities related to improving balance, coordination, kinesthetic sense, posture, motor skill, proprioception  to assist with core control in self care, mobility, lifting, and ambulation.      Therapeutic Activities:    [] (65990 or 55832) Provided verbal/tactile cueing for activities related to improving balance, coordination, kinesthetic sense, posture, motor skill, proprioception and motor activation to allow for proper function  with self care and ADLs  [] (05840) Provided training and instruction to the patient for proper core and proximal hip recruitment and positioning with ambulation re-education     Home Exercise Program:    [x] (90781) Reviewed/Progressed HEP activities related to strengthening, flexibility, endurance, ROM of core, proximal hip and LE for functional self-care, mobility, lifting and ambulation   [] (80543) Reviewed/Progressed HEP

## 2020-06-25 ENCOUNTER — APPOINTMENT (OUTPATIENT)
Dept: PHYSICAL THERAPY | Age: 65
End: 2020-06-25
Payer: COMMERCIAL

## 2020-07-02 ENCOUNTER — HOSPITAL ENCOUNTER (OUTPATIENT)
Dept: PHYSICAL THERAPY | Age: 65
Setting detail: THERAPIES SERIES
Discharge: HOME | End: 2020-07-02
Payer: COMMERCIAL

## 2020-07-02 NOTE — FLOWSHEET NOTE
[facility], [address]        Physical Therapy  Cancellation/No-show Note  Patient Name:  Lg Goins  :  1955   Date:  2020  Cancelled visits to date: 3  No-shows to date: 1    For today's appointment patient:  x Cancelled  ? Rescheduled appointment  ? No-show     Reason given by patient:  ?  Patient ill  ? Conflicting appointment  ? No transportation        Conflict with work  ?   No reason given  x  Other:  Wife had surgery   Comments:    Electronically signed by:  Sherlyn Fitzpatrick, PT,DPT [id number]

## 2020-07-09 ENCOUNTER — HOSPITAL ENCOUNTER (OUTPATIENT)
Dept: PHYSICAL THERAPY | Age: 65
Setting detail: THERAPIES SERIES
Discharge: HOME | End: 2020-07-09
Payer: COMMERCIAL

## 2020-07-09 NOTE — FLOWSHEET NOTE
[facility],  [address]        Physical Therapy  Cancellation/No-show Note  Patient Name:  Davy Allison  :  1955   Date:  2020  Cancelled visits to date: 4  No-shows to date: 1    For today's appointment patient:  X  Cancelled  ? Rescheduled appointment  ? No-show     Reason given by patient:  ?  Patient ill  ? Conflicting appointment  ? No transportation    X  Conflict with work  ? No reason given  ? Other:     Comments:      Electronically signed by:  Philip French, PT; Jovi Zimmerman SPT.

## 2020-07-20 ENCOUNTER — OFFICE VISIT (OUTPATIENT)
Dept: ORTHOPEDIC SURGERY | Age: 65
End: 2020-07-20
Payer: COMMERCIAL

## 2020-07-20 VITALS — BODY MASS INDEX: 30.64 KG/M2 | WEIGHT: 214 LBS | HEIGHT: 70 IN

## 2020-07-20 PROCEDURE — 99203 OFFICE O/P NEW LOW 30 MIN: CPT | Performed by: PHYSICAL MEDICINE & REHABILITATION

## 2020-07-20 NOTE — PROGRESS NOTES
New Patient: SPINE    CHIEF COMPLAINT:    Chief Complaint   Patient presents with    Back Pain     low back pain xyears getting worse, was refered by our physical therapy [address]:                The patient is a 63 y.o. male self-referred for back pain to physical therapy. His history is chronic intermittent back pain is all right mechanical pain worse in a stationary positioning whether sitting or standing. Better with movement and change of position. No rating pain distally. Initially constant severe. He thought he had kidney stones went to the emergency department with negative work-up per his report. Reports increasing worsening right buttock pain. This is been responsiveHe now has pain intermittently. Symptoms wax and wane    Past Medical History:   Diagnosis Date    Adenomatous colon polyp 04/17/09    re do 2014    Agitation     Erectile dysfunction     Family history of early CAD 04/2010    Coronary Ca+ Score = \" 10\"    Hx of blood clots     calf post op     Hyperlipidemia     Kidney stone 03/2005    Lesion of ulnar nerve 4/2/2015    Osteoarthritis of both knees 1/13/2020    PONV (postoperative nausea and vomiting)     Shingles           Pain Assessment  Location of Pain: Back  Severity of Pain: 5  Quality of Pain: Aching  Duration of Pain: Persistent  Frequency of Pain: Constant  Aggravating Factors: Standing, Walking, Other (Comment)  Limiting Behavior: Yes  Relieving Factors: Rest  Result of Injury: No  Work-Related Injury: No  Are there other pain locations you wish to document?: No    The pain assessment was noted & reviewed in the medical record today.      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[employer];  [employer]    Past Medical History: Medical history form was reviewed today & scanned into the media tab  Past Medical History:   Diagnosis Date    Adenomatous colon polyp 04/17/09    re do 2014    Agitation     Erectile dysfunction     Family history of early CAD 04/2010    Coronary Ca+ Score = \" 10\"    Hx of blood clots     calf post op     Hyperlipidemia     Kidney stone 03/2005    Lesion of ulnar nerve 4/2/2015    Osteoarthritis of both knees 1/13/2020    PONV (postoperative nausea and vomiting)     Shingles       Past Surgical History:     Past Surgical History:   Procedure Laterality Date    ACHILLES TENDON SURGERY      rupture/repair    COLONOSCOPY  04/07/09    Tubular Adenoma    COLONOSCOPY  1/12/15    ELBOW SURGERY Right     EXCISION OF AURAL MASS      KIDNEY STONE SURGERY      KNEE ARTHROSCOPY      rt    TENOLYSIS      VASECTOMY      VASECTOMY  1992     Current Medications:     Current Outpatient Medications:     methocarbamol (ROBAXIN) 500 MG tablet, Take 1 tablet by mouth 4 times daily as needed (muscle spasms), Disp: 20 tablet, Rfl: 0    tamsulosin (FLOMAX) 0.4 MG capsule, Take 1 capsule by mouth daily (Patient not taking: Reported on 3/9/2020), Disp: 10 capsule, Rfl: 0    diclofenac (VOLTAREN) 75 MG EC tablet, Take 1 tablet by mouth 2 times daily (with meals), Disp: 60 tablet, Rfl: 1    pravastatin (PRAVACHOL) 80 MG tablet, TAKE ONE TABLET BY MOUTH EVERY EVENING FOR HIGH CHOLESTEROL, Disp: 30 tablet, Rfl: 9    clobetasol (OLUX) 0.05 % foam, , Disp: , Rfl:     econazole nitrate 1 % cream, , Disp: , Rfl:     multivitamin (THERAGRAN) per tablet, Take 1 tablet by mouth daily. , Disp: , Rfl:   Allergies:  Penicillins  Social History:    reports that he has never smoked.  He uses smokeless tobacco. He reports current alcohol use of about 6.0 standard drinks of alcohol per week. He reports that he does not use drugs. Family History:   Family History   Problem Relation Age of Onset    Cancer Father         Stomach CA    Heart Disease Father 54        MI x 2       REVIEW OF SYSTEMS: Full ROS noted & scanned   CONSTITUTIONAL: Denies unexplained weight loss, fevers, chills or fatigue  NEUROLOGICAL: Denies unsteady gait or progressive weakness  MUSCULOSKELETAL: Denies joint swelling or redness  PSYCHOLOGICAL: Denies anxiety, depression   SKIN: Denies skin changes, delayed healing, rash, itching   HEMATOLOGIC: Denies easy bleeding or bruising  ENDOCRINE: Denies excessive thirst, urination, heat/cold  RESPIRATORY: Denies current dyspnea, cough  GI: Denies nausea, vomiting, diarrhea   : Denies bowel or bladder issues       PHYSICAL EXAM:    Vitals: Height 5' 10\" (1.778 m), weight 214 lb (97.1 kg). GENERAL EXAM:  · General Apparence: Patient is adequately groomed with no evidence of malnutrition. · Orientation: The patient is oriented to time, place and person. · Mood & Affect:The patient's mood and affect are appropriate   · Vascular: Examination reveals no swelling tenderness in upper or lower extremities. Good capillary refill  · Lymphatic: The lymphatic examination bilaterally reveals all areas to be without enlargement or induration  · Sensation: Sensation is intact without deficit  · Coordination/Balance: Good coordination       LUMBAR/SACRAL EXAMINATION:  · Inspection: Local inspection shows no step-off or bruising. Lumbar alignment is normal.  Sagittal and Coronal balance is neutral.      · Palpation: He is mildly tender over the right L5 junction and the paraspinal  · Range of Motion: Mild loss of both flexion extension with mild discomfort right low back  · Strength:   Strength testing is 5/5 in all muscle groups tested. · Special Tests:   Straight leg raise and crossed SLR negative. Leg length and pelvis level.  0 out of 5 Brendan's signs. · Skin: There are no rashes, ulcerations or lesions. · Reflexes: Reflexes are symmetrically 2+ at the patellar and ankle tendons. Clonus absent bilaterally at the feet. · Gait & station: Normal gait  · Additional Examinations:   · RIGHT LOWER EXTREMITY: Inspection/examination of the right lower extremity does not show any tenderness, deformity or injury. Range of motion is full. There is no gross instability. There are no rashes, ulcerations or lesions. Strength and tone are normal.  ·   · LEFT LOWER EXTREMITY:  Inspection/examination of the left lower extremity does not show any tenderness, deformity or injury. Range of motion is full. There is no gross instability. There are no rashes, ulcerations or lesions.   Strength and tone are normal.    Diagnostic Testin/15/2020  9:37 AM - Eloy Boot Incoming Lab Results From Soft (Epic Adt)     Specimen Information:  Urine, clean catch          Component  Value  Ref Range & Units  Status  Collected  Lab    Color, UA  Yellow  Straw/Yellow  Final  02/15/2020  9:19 AM  [address] [place] [address]  Clear  Clear  Final  02/15/2020  9:19 AM  [facility]    Glucose, Ur  Negative  Negative mg/dL  Final  02/15/2020  9:19 AM  [facility]    Bilirubin Urine  Negative  Negative  Final  02/15/2020  9:19 AM  [facility]    Ketones, Urine  Negative  Negative mg/dL  Final  02/15/2020  9:19 AM  [facility]    Specific Gravity, UA  1.025  1.005 - 1.030  Final  02/15/2020  9:19 AM  [facility]    Blood, Urine  Negative  Negative  Final  02/15/2020  9:19 AM  [facility]    pH, UA  5.5  5.0 - 8.0  Final  02/15/2020  9:19 AM  [facility]    Protein, UA  Negative  Negative mg/dL  Final  02/15/2020  9:19 AM  [facility]    Urobilinogen, Urine  0.2  <2.0 E.U./dL  Final  02/15/2020  9:19 AM  [facility]    Nitrite, Urine  Negative  Negative  Final 02/15/2020  9:19 AM  [address] [facility]    Leukocyte Esterase, Urine  Negative  Negative  Final  02/15/2020  9:19 AM  - [facility]    Microscopic Examination  Not Indicated   Final  02/15/2020  9:19 AM  [address] [facility]    Urine Type  NotGiven   Final  02/15/2020  9:19 AM  [facility]    Urine Reflex to Culture  Not Indicated              2 views lumbar spine 7/20/2020 show a moderate to advanced diffuse DDD spondylosis with  Facet arthropathy  Impression:      Subacute on chronic worsening right mechanical back pain  Discogenic spondylosis    Plan: Local course not improved    MRI lumbar spine given lack of improvements with conservative care        MERCEDES Lewis

## 2020-07-22 ENCOUNTER — TELEPHONE (OUTPATIENT)
Dept: ORTHOPEDIC SURGERY | Age: 65
End: 2020-07-22

## 2020-07-22 NOTE — TELEPHONE ENCOUNTER
MRI/CT SCAN approval. [facility] has been faxed auth# and demographic information. Patient was instructed to call the central scheduling department for a follow-up appointment after test is scheduled.

## 2020-08-13 ENCOUNTER — OFFICE VISIT (OUTPATIENT)
Dept: ORTHOPEDIC SURGERY | Age: 65
End: 2020-08-13
Payer: COMMERCIAL

## 2020-08-13 VITALS — HEIGHT: 70 IN | BODY MASS INDEX: 30.64 KG/M2 | WEIGHT: 214 LBS

## 2020-08-13 PROCEDURE — 99214 OFFICE O/P EST MOD 30 MIN: CPT | Performed by: PHYSICAL MEDICINE & REHABILITATION

## 2020-08-13 NOTE — LETTER
[facility]    Please Schedule the following with: Dr. Kevon Patricio    Date:  8/13/20     Patient: Ranulfo Butler     YOB: 1955    Patient Home Phone: [phone] (home)    Diagnosis: Right L3-4 disc bulging M51.26, DDD M51.36 foraminal stenosis M48.062    []LT     [x]RT     []JAIME     []Midline    Levels: Right L3-4-5 transforaminal epidural, #1    []Cervical FABI 16228, 41943  []L-MBB 93404, 49852  []SI Joint 70188   []C-FACET 09738, 99257, 15061  []L-FACET 18879, 14913  []Interlaminar FABI 99193     []HIP 65137    []C-MBB  [x]Transforaminal FABI 00148  []Neurotomy 97204, 59929, 13583    Attending Physician: Victor M Cueto    Injection Schedule for: At: [facility]    First Insurance: [employer] #:  Second Insurance:                 Pre-cert #:    Comments:    SEDATION:       [] IV           [] ORAL    [] Blood Thinner:                 []Diabetic           []Antibiotic:               []Glaucoma:    [] Pacemaker/defib       [] Current Open Wounds, Lacerations or Sores     Allergies:    Allergies   Allergen Reactions    Penicillins Nausea And Vomiting       Past Medical History:   Diagnosis Date    Adenomatous colon polyp 04/17/09    re do 2014    Agitation     Erectile dysfunction     Family history of early CAD 04/2010    Coronary Ca+ Score = \" 10\"    Hx of blood clots     calf post op     Hyperlipidemia     Kidney stone 03/2005    Lesion of ulnar nerve 4/2/2015    Osteoarthritis of both knees 1/13/2020    PONV (postoperative nausea and vomiting)     Shingles         Current Outpatient Medications   Medication Sig Dispense Refill    methocarbamol (ROBAXIN) 500 MG tablet Take 1 tablet by mouth 4 times daily as needed (muscle spasms) 20 tablet 0    tamsulosin (FLOMAX) 0.4 MG capsule Take 1 capsule by mouth daily (Patient not taking: Reported on 3/9/2020) 10 capsule 0  diclofenac (VOLTAREN) 75 MG EC tablet Take 1 tablet by mouth 2 times daily (with meals) 60 tablet 1    pravastatin (PRAVACHOL) 80 MG tablet TAKE ONE TABLET BY MOUTH EVERY EVENING FOR HIGH CHOLESTEROL 30 tablet 9    clobetasol (OLUX) 0.05 % foam       econazole nitrate 1 % cream       multivitamin (THERAGRAN) per tablet Take 1 tablet by mouth daily. No current facility-administered medications for this visit. [facility]                     ______________________________________________________________________      [address] [facility]      1. Admit to preop. 2. Start IV 1000 ml LR at [facility] or _____ml/hr for planned conscious sedation     3. May inject 1 % Lidocaine 0.1 ml Intradermal to numb IV site     4. Protime/INR if patient is on Coumadin     5. Urine Pregnancy Test (females only) - 12 -50 years     6. Accu Check Glucose if diabetic. Notify physician if <80 or >250.      7. Sedate all neurotomies          ______________________________________________________________________    POST-OPERATIVE ORDERS - DR. TORO      1. Admit to Post Op Phase 2     2. Implement Standards of Care for Phase 2 Post Op     3. Check Site - May discharge when site is free of bleeding     4. Discharge to home after meets Phase 2 criteria     5. Discharge cervical patients after 30 minutes and when meets Phase 2 criteria. 6. Give discharge instruction sheet     7. For Diabetic patient, if blood sugar less than 80 in preop,          Recheck blood sugar in Post Op. 8. Discontinue IV     9.  For Nausea may give Zofran 4 MG IV/IM/ODT           ______________________________________________________________________    [facility]     1955 8/13/20 2:56 PM

## 2020-08-13 NOTE — PROGRESS NOTES
Lumbar follow-up: SPINE    CHIEF COMPLAINT:    Chief Complaint   Patient presents with    Back Pain     MRI result lumbar spine       HISTORY OF PRESENT ILLNESS:                The patient is a 63 y.o. male keyon pain worse in a stationary positioning whether sitting or standing. Better with movement and change of position. No rating pain distally. Initially constant severe. 2 out of 7 days he has bad pain that stops his activity. Mainly in his right low back right lateral hip he does have some tightness sensation of his right anterior thigh close to the knee. No progressive weakness overall not improved  Past Medical History:   Diagnosis Date    Adenomatous colon polyp 04/17/09    re do 2014    Agitation     Erectile dysfunction     Family history of early CAD 04/2010    Coronary Ca+ Score = \" 10\"    Hx of blood clots     calf post op     Hyperlipidemia     Kidney stone 03/2005    Lesion of ulnar nerve 4/2/2015    Osteoarthritis of both knees 1/13/2020    PONV (postoperative nausea and vomiting)     Shingles           Pain Assessment  Location of Pain: Back  Severity of Pain: 5  Quality of Pain: Aching  Duration of Pain: Persistent  Frequency of Pain: Constant  Aggravating Factors: Standing, Walking, Other (Comment)  Limiting Behavior: Yes  Relieving Factors: Rest  Result of Injury: No  Work-Related Injury: No  Are there other pain locations you wish to document?: No    The pain assessment was noted & reviewed in the medical record today.      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[employer];  [employer] [place]    Past Medical History: Medical history form was reviewed today & scanned into the media tab  Past Medical History:   Diagnosis Date    Adenomatous colon polyp 04/17/09    re do 2014    Agitation     Erectile dysfunction     Family history of early CAD 04/2010    Coronary Ca+ Score = \" 10\"    Hx of blood clots     calf post op     Hyperlipidemia     Kidney stone 03/2005    Lesion of ulnar nerve 4/2/2015    Osteoarthritis of both knees 1/13/2020    PONV (postoperative nausea and vomiting)     Shingles       Past Surgical History:     Past Surgical History:   Procedure Laterality Date    ACHILLES TENDON SURGERY      rupture/repair    COLONOSCOPY  04/07/09    Tubular Adenoma    COLONOSCOPY  1/12/15    ELBOW SURGERY Right     EXCISION OF AURAL MASS      KIDNEY STONE SURGERY      KNEE ARTHROSCOPY      rt    TENOLYSIS      VASECTOMY      VASECTOMY  1992     Current Medications:     Current Outpatient Medications:     methocarbamol (ROBAXIN) 500 MG tablet, Take 1 tablet by mouth 4 times daily as needed (muscle spasms), Disp: 20 tablet, Rfl: 0    tamsulosin (FLOMAX) 0.4 MG capsule, Take 1 capsule by mouth daily (Patient not taking: Reported on 3/9/2020), Disp: 10 capsule, Rfl: 0    diclofenac (VOLTAREN) 75 MG EC tablet, Take 1 tablet by mouth 2 times daily (with meals), Disp: 60 tablet, Rfl: 1    pravastatin (PRAVACHOL) 80 MG tablet, TAKE ONE TABLET BY MOUTH EVERY EVENING FOR HIGH CHOLESTEROL, Disp: 30 tablet, Rfl: 9    clobetasol (OLUX) 0.05 % foam, , Disp: , Rfl:     econazole nitrate 1 % cream, , Disp: , Rfl:     multivitamin (THERAGRAN) per tablet, Take 1 tablet by mouth daily. , Disp: , Rfl:   Allergies:  Penicillins  Social History:    reports that he has never smoked. He uses smokeless tobacco. He reports current alcohol use of about 6.0 standard drinks of alcohol per week. He reports that he does not use drugs.   Family History:   Family History   Problem Relation Age of Onset    Cancer Father         Stomach CA    Heart Disease Father 54        MI x 2       REVIEW OF SYSTEMS: Full ROS noted & scanned   CONSTITUTIONAL: Denies unexplained weight loss, fevers, chills or fatigue  NEUROLOGICAL: Denies unsteady gait or progressive weakness  MUSCULOSKELETAL: Denies joint swelling or redness  PSYCHOLOGICAL: Denies anxiety, depression   SKIN: Denies skin changes, delayed healing, rash, itching   HEMATOLOGIC: Denies easy bleeding or bruising  ENDOCRINE: Denies excessive thirst, urination, heat/cold  RESPIRATORY: Denies current dyspnea, cough  GI: Denies nausea, vomiting, diarrhea   : Denies bowel or bladder issues       PHYSICAL EXAM:    Vitals: Height 5' 10\" (1.778 m), weight 214 lb (97.1 kg). GENERAL EXAM:  · General Apparence: Patient is adequately groomed with no evidence of malnutrition. · Orientation: The patient is oriented to time, place and person. · Mood & Affect:The patient's mood and affect are appropriate   · Vascular: Examination reveals no swelling tenderness in upper or lower extremities. Good capillary refill  · Lymphatic: The lymphatic examination bilaterally reveals all areas to be without enlargement or induration  · Sensation: Sensation is intact without deficit  · Coordination/Balance: Good coordination       LUMBAR/SACRAL EXAMINATION:  · Inspection: Local inspection shows no step-off or bruising. Lumbar alignment is normal.  Sagittal and Coronal balance is neutral.      · Palpation: He is mildly tender over the right L5 junction and the paraspinal  · Range of Motion: Mild loss of both flexion extension with mild discomfort right low back  · Strength:   Strength testing is 5/5 in all muscle groups tested. · Special Tests:   Straight leg raise and crossed SLR negative. Leg length and pelvis level.  0 out of 5 Brendan's signs. · Skin: There are no rashes, ulcerations or lesions. · Reflexes: Reflexes are symmetrically 2+ at the patellar and ankle tendons. Clonus absent bilaterally at the feet.   · Gait & station: Normal gait  · Additional Examinations:   · RIGHT LOWER EXTREMITY: Inspection/examination of the right lower extremity does not show any tenderness, deformity or injury. Range of motion is full. There is no gross instability. There are no rashes, ulcerations or lesions. Strength and tone are normal.  ·   · LEFT LOWER EXTREMITY:  Inspection/examination of the left lower extremity does not show any tenderness, deformity or injury. Range of motion is full. There is no gross instability. There are no rashes, ulcerations or lesions.   Strength and tone are normal.    Diagnostic Testing:      His new lumbar MRI shows diffuse discogenic spondylosis with superimposed disc bulging with foraminal narrowing and Modic changes    2/15/2020  9:37 AM - Dmitry Carson Incoming Lab Results From Soft (Epic Adt)     Specimen Information:  Urine, clean catch          Component  Value  Ref Range & Units  Status  Collected  Lab    Color, UA  Yellow  Straw/Yellow  Final  02/15/2020  9:19 AM  [address] [place] [address]  Clear  Clear  Final  02/15/2020  9:19 AM  [facility]    Glucose, Ur  Negative  Negative mg/dL  Final  02/15/2020  9:19 AM  [facility]    Bilirubin Urine  Negative  Negative  Final  02/15/2020  9:19 AM  [facility]    Ketones, Urine  Negative  Negative mg/dL  Final  02/15/2020  9:19 AM  [facility]    Specific Gravity, UA  1.025  1.005 - 1.030  Final  02/15/2020  9:19 AM  [facility]    Blood, Urine  Negative  Negative  Final  02/15/2020  9:19 AM  [facility]    pH, UA  5.5  5.0 - 8.0  Final  02/15/2020  9:19 AM  [facility]    Protein, UA  Negative  Negative mg/dL  Final  02/15/2020  9:19 AM  [facility]    Urobilinogen, Urine  0.2  <2.0 E.U./dL  Final  02/15/2020  9:19 AM  [address] [facility]    Nitrite, Urine  Negative  Negative  Final  02/15/2020  9:19 AM  [facility]

## 2020-08-21 ENCOUNTER — TELEPHONE (OUTPATIENT)
Dept: ORTHOPEDIC SURGERY | Age: 65
End: 2020-08-21

## 2020-09-02 ENCOUNTER — TELEPHONE (OUTPATIENT)
Dept: ORTHOPEDIC SURGERY | Age: 65
End: 2020-09-02

## 2020-09-02 NOTE — TELEPHONE ENCOUNTER
Auth: # NPR  Ref # [id number]    Date: 09/15/2020    rescheduled  Type of SX:  OP  Location: [facility]  CPT: 28530   DX Code: M51.26   M51.36    M48.062  SX area: Right L3 -  L4  Transforaminal FABI  Insurance: Ana Thomas

## 2020-09-03 ENCOUNTER — OFFICE VISIT (OUTPATIENT)
Dept: ORTHOPEDIC SURGERY | Age: 65
End: 2020-09-03
Payer: COMMERCIAL

## 2020-09-03 VITALS — WEIGHT: 214.07 LBS | HEIGHT: 70 IN | BODY MASS INDEX: 30.65 KG/M2

## 2020-09-03 PROCEDURE — 99213 OFFICE O/P EST LOW 20 MIN: CPT | Performed by: PHYSICIAN ASSISTANT

## 2020-09-03 RX ORDER — TRAMADOL HYDROCHLORIDE 50 MG/1
50 TABLET ORAL 2 TIMES DAILY PRN
Qty: 14 TABLET | Refills: 0 | Status: SHIPPED | OUTPATIENT
Start: 2020-09-03 | End: 2020-09-10

## 2020-09-15 ENCOUNTER — HOSPITAL ENCOUNTER (OUTPATIENT)
Age: 65
Setting detail: OUTPATIENT SURGERY
Discharge: HOME | End: 2020-09-15
Attending: PHYSICAL MEDICINE & REHABILITATION | Admitting: PHYSICAL MEDICINE & REHABILITATION
Payer: COMMERCIAL

## 2020-09-15 VITALS
OXYGEN SATURATION: 100 % | RESPIRATION RATE: 12 BRPM | DIASTOLIC BLOOD PRESSURE: 79 MMHG | HEART RATE: 80 BPM | SYSTOLIC BLOOD PRESSURE: 129 MMHG | TEMPERATURE: 97.4 F

## 2020-09-15 PROCEDURE — 6360000002 HC RX W HCPCS: Performed by: PHYSICAL MEDICINE & REHABILITATION

## 2020-09-15 PROCEDURE — 3600000002 HC SURGERY LEVEL 2 BASE: Performed by: PHYSICAL MEDICINE & REHABILITATION

## 2020-09-15 PROCEDURE — 2709999900 HC NON-CHARGEABLE SUPPLY: Performed by: PHYSICAL MEDICINE & REHABILITATION

## 2020-09-15 PROCEDURE — 2500000003 HC RX 250 WO HCPCS: Performed by: PHYSICAL MEDICINE & REHABILITATION

## 2020-09-15 PROCEDURE — 3600000012 HC SURGERY LEVEL 2 ADDTL 15MIN: Performed by: PHYSICAL MEDICINE & REHABILITATION

## 2020-09-15 PROCEDURE — 6360000004 HC RX CONTRAST MEDICATION: Performed by: PHYSICAL MEDICINE & REHABILITATION

## 2020-09-15 PROCEDURE — 7100000010 HC PHASE II RECOVERY - FIRST 15 MIN: Performed by: PHYSICAL MEDICINE & REHABILITATION

## 2020-09-15 RX ORDER — DEXAMETHASONE SODIUM PHOSPHATE 10 MG/ML
INJECTION, SOLUTION INTRAMUSCULAR; INTRAVENOUS
Status: DISCONTINUED
Start: 2020-09-15 | End: 2020-09-15 | Stop reason: HOSPADM

## 2020-09-15 RX ORDER — METHYLPREDNISOLONE ACETATE 40 MG/ML
INJECTION, SUSPENSION INTRA-ARTICULAR; INTRALESIONAL; INTRAMUSCULAR; SOFT TISSUE
Status: DISCONTINUED
Start: 2020-09-15 | End: 2020-09-15 | Stop reason: WASHOUT

## 2020-09-15 RX ORDER — LIDOCAINE HYDROCHLORIDE 10 MG/ML
INJECTION, SOLUTION EPIDURAL; INFILTRATION; INTRACAUDAL; PERINEURAL PRN
Status: DISCONTINUED | OUTPATIENT
Start: 2020-09-15 | End: 2020-09-15 | Stop reason: ALTCHOICE

## 2020-09-15 ASSESSMENT — PAIN - FUNCTIONAL ASSESSMENT
PAIN_FUNCTIONAL_ASSESSMENT: PREVENTS OR INTERFERES WITH MANY ACTIVE NOT PASSIVE ACTIVITIES
PAIN_FUNCTIONAL_ASSESSMENT: 0-10

## 2020-09-15 ASSESSMENT — PAIN DESCRIPTION - DESCRIPTORS: DESCRIPTORS: DULL

## 2020-09-15 NOTE — OP NOTE
Patient:  Aguilar Barnes  Record #:  [id number]   Date:  9/15/2020  Physician:  Farnaz Zavaleta M.D. Facility: [facility]       Pre-op diagnosis: Lumbar radiculitis, lumbar spondylosis   Post-op diagnosis:  same  Procedure: Right L3-4 transforaminal epidural injection #1 with flouroscopic guidance    Procedure Note:    The patient was admitted through pre-op and written consent was obtained. The patient was advised of the risks and benefits of the procedure, including but not limited to the following: bleeding, pain, infection, temporary paralysis, nerve damage and spinal headache. The patient was given the opportunity to ask questions. There were no contraindications for this procedure. The appropriate area was prepped and draped in a sterile fashion. Landmarks were identified and marked. A 23G spinal needle was advanced to the right L3 neural foramen using fluoroscopic guidance with ideal needle tip placement confirmed by multiple views. Injection of contrast showed epidural flow. There were no signs of intravascular or intrathecal injection. 10 mg Dexamethasone and 2 mL lidocaine were then injected. There were no complications and the patient tolerated the procedure well. The patient was transferred to the recovery area and monitored. Discharge instructions were given. The patient is to contact me for any post-procedure concerns. The patient is to follow up as scheduled.     Estimated blood loss: none    MERCEDES Peck MD

## 2020-09-15 NOTE — H&P
HISTORY AND PHYSICAL/PRE-SEDATION ASSESSMENT    Patient:  Maureen Singleton   :  1955  Medical Record No.:  [id number]   Date:  9/15/2020  Physician:  Angelique Andersen M.D. Facility: [facility]     Nursing History and Physical reviewed and agreed upon. Additional findings:    Allergies:  Penicillins    Home Medications:    Prior to Admission medications    Medication Sig Start Date End Date Taking? Authorizing Provider   methocarbamol (ROBAXIN) 500 MG tablet Take 1 tablet by mouth 4 times daily as needed (muscle spasms) 2/15/20  Yes Milo Lopez MD   clobetasol (OLUX) 0.05 % foam  17  Yes Historical Provider, MD   econazole nitrate 1 % cream  17  Yes Historical Provider, MD   tamsulosin (FLOMAX) 0.4 MG capsule Take 1 capsule by mouth daily 20   Trang Channel, APRN - CNP   diclofenac (VOLTAREN) 75 MG EC tablet Take 1 tablet by mouth 2 times daily (with meals) 10/17/19   Nam Romero MD   pravastatin (PRAVACHOL) 80 MG tablet TAKE ONE TABLET BY MOUTH EVERY EVENING FOR HIGH CHOLESTEROL 3/11/18   Christelle Gao MD   multivitamin [facility]) per tablet Take 1 tablet by mouth daily. Historical Provider, MD       Vitals: Stable     PHYSICAL EXAM:  HENT: Airway patent and reviewed  Cardiovascular: Normal rate, regular rhythm, normal heart sounds. Pulmonary/Chest: No wheezes. No rhonchi. No rales. Abdominal: Soft. Bowel sounds are normal. No distension. Mallampati: 2      MALLAMPATI:           []   I. Complete visualization of the soft palate           [x]   II. Complete visualization of the uvula            []   III. Visualization of only the base of the uvula           []   IV. Soft palate is not visible     ASA CLASS:         []   I. Normal, healthy adult           [x]   II.  Mild systemic disease            []   III.   Severe systemic disease      Sedation plan:   [x]  Local              []  Minimal                  []  General anesthesia    Patient's condition acceptable for planned procedure/sedation. Post Procedure Plan   Return to same level of care   ______________________     The risks and benefits as well as alternatives to the procedure have been discussed with the patient and or family. The patient and or next of kin understands and agrees to proceed.     Kobe Singh M.D.

## 2020-09-29 ENCOUNTER — VIRTUAL VISIT (OUTPATIENT)
Dept: ORTHOPEDIC SURGERY | Age: 65
End: 2020-09-29
Payer: COMMERCIAL

## 2020-09-29 PROCEDURE — 99441 PR PHYS/QHP TELEPHONE EVALUATION 5-10 MIN: CPT | Performed by: PHYSICIAN ASSISTANT

## 2020-09-29 NOTE — PROGRESS NOTES
TELEPHONE VISIT: SPINE    CHIEF COMPLAINT:    Chief Complaint   Patient presents with    Back Pain       Patient provided verbal consent to proceed with telephone visit; aware he/she may receive a bill for this telephone service, depending on insurance coverage. The patient is being evaluated by a telephone encounter due to the restrictions of the COVID-19 pandemic. A caregiver was present when appropriate. All issues as below were discussed and addressed, but no physical exam was performed unless allowed by visual confirmation. If it was felt that patient should be evaluated in clinic then they were directed there. Due to this being a TeleHealth encounter (During [facility]- public health emergency), evaluation of the following organ systems was limited to: spine. Pursuant to the emergency declaration under the [facility] waiver authority and the [employer] Act, this Virtual Visit was conducted with patient's verbal consent, to reduce the risk of exposure to COVID-19 and provide necessary medical care. The patient (and/or legal guardian) has also been advised to contact this office for worsening conditions or problems, and seek emergency medical treatment and/or call 911 if deemed necessary. Individuals present during telemedicine consultation-Fanta Quinonez, [facility] & the patient     HISTORY OF PRESENT ILLNESS:                The patient is a 63 y.o. male consent granted for telephone visit f/u after R L34 TX FABI #1 from 9/15/20 for 7-month history of aching right low back and buttock pain. Symptoms were increased with any prolonged positioning. He was having difficulty sleeping at night. Some relief with changing position. He is 90% improved since FABI with improved function. Other conservative care includes oral steroids, NSAIDs, Robaxin, physical therapy, Tramadol--d/c.   He denies any distal radiating pain, no progressive numbness tingling weakness. No side effects to procedure.      Current/Past Treatment:   · Physical Therapy: X6 visits  · Chiropractic:     · Injection:   9/15/2020 Right L3-4 transforaminal epidural injection #1--90%  Medications:            NSAIDS: Diclofenac            Muscle relaxer:   Robaxin            Steriods:   Yes            Neuropathic medications:              Opioids:            Other:   · Surgery/Consult:     Medical history:  Past Medical History:   Diagnosis Date    Adenomatous colon polyp 04/17/09    re do 2014    Agitation     Erectile dysfunction     Family history of early CAD 04/2010    Coronary Ca+ Score = \" 10\"    Hx of blood clots     calf post op     Hyperlipidemia     Kidney stone 03/2005    Lesion of ulnar nerve 4/2/2015    Osteoarthritis of both knees 1/13/2020    PONV (postoperative nausea and vomiting)     Shingles        Past Surgical History:     Past Surgical History:   Procedure Laterality Date    ACHILLES TENDON SURGERY      rupture/repair    COLONOSCOPY  04/07/09    Tubular Adenoma    COLONOSCOPY  1/12/15    ELBOW SURGERY Right     EXCISION OF AURAL MASS      KIDNEY STONE SURGERY      KNEE ARTHROSCOPY      rt    PAIN MANAGEMENT PROCEDURE Right 9/15/2020    RIGHT LUMBAR THREE, FOUR, FIVE EPIDURAL STEROID INJECTION SITE CONFIRMED BY FLUOROSCOPY performed by Faizan Way MD at [address] TENOLYSIS      VASECTOMY      VASECTOMY  1992     Current Medications:     Current Outpatient Medications:     methocarbamol (ROBAXIN) 500 MG tablet, Take 1 tablet by mouth 4 times daily as needed (muscle spasms), Disp: 20 tablet, Rfl: 0    tamsulosin (FLOMAX) 0.4 MG capsule, Take 1 capsule by mouth daily, Disp: 10 capsule, Rfl: 0    diclofenac (VOLTAREN) 75 MG EC tablet, Take 1 tablet by mouth 2 times daily (with meals), Disp: 60 tablet, Rfl: 1    pravastatin (PRAVACHOL) 80 MG tablet, TAKE ONE TABLET BY MOUTH EVERY EVENING FOR HIGH CHOLESTEROL, Disp: 30 tablet, Rfl: 9    clobetasol (OLUX) 0.05 % foam, , Disp: , Rfl:     econazole nitrate 1 % cream, , Disp: , Rfl:     multivitamin (THERAGRAN) per tablet, Take 1 tablet by mouth daily. , Disp: , Rfl:   Allergies:  Penicillins  Social History:    reports that he has never smoked. He uses smokeless tobacco. He reports current alcohol use of about 6.0 standard drinks of alcohol per week. He reports that he does not use drugs. Family History:   Family History   Problem Relation Age of Onset    Cancer Father         Stomach CA    Heart Disease Father 54        MI x 2       REVIEW OF SYSTEMS:   CONSTITUTIONAL: Denies unexplained weight loss, fevers, chills or fatigue  NEUROLOGICAL: Denies unsteady gait or progressive weakness  MUSCULOSKELETAL: Denies joint swelling or redness  GI: Denies nausea, vomiting, diarrhea   : Denies bowel or bladder issues         Vitals: Height 5' 10\" (1.778 m), weight 214 lb 1.1 oz (97.1 kg).     Diagnostic Testing:   His new lumbar MRI shows diffuse discogenic spondylosis with superimposed disc bulging with foraminal narrowing and Modic changes     2 views lumbar spine 7/20/2020 show a moderate to advanced diffuse DDD spondylosis with Facet arthropathy        Impression:  1) Subacute on chronic worsening right mechanical back pain--improved   2) Discogenic spondylosis right L3-4 mild right radiculitis  3) ORT=0      Plan:  1) Cont HEP  2) May call for repeat R L34 TX FABI #2 if needed      Present during telephone call: Syed Shi, [facility]    Total time spent on medical discussion/telephone visit: 5min    Fanta Hollis [facility]

## 2020-12-01 ENCOUNTER — NURSE TRIAGE (OUTPATIENT)
Dept: OTHER | Facility: CLINIC | Age: 65
End: 2020-12-01

## 2020-12-02 ENCOUNTER — OFFICE VISIT (OUTPATIENT)
Dept: PRIMARY CARE CLINIC | Age: 65
End: 2020-12-02
Payer: COMMERCIAL

## 2020-12-02 ENCOUNTER — CLINICAL DOCUMENTATION (OUTPATIENT)
Dept: INTERNAL MEDICINE CLINIC | Age: 65
End: 2020-12-02

## 2020-12-02 ENCOUNTER — TELEPHONE (OUTPATIENT)
Dept: INTERNAL MEDICINE CLINIC | Age: 65
End: 2020-12-02

## 2020-12-02 PROCEDURE — 99211 OFF/OP EST MAY X REQ PHY/QHP: CPT | Performed by: NURSE PRACTITIONER

## 2020-12-02 NOTE — PROGRESS NOTES
Tiffanie Dose Mullenax received a viral test for COVID-19. They were educated on isolation and quarantine as appropriate. For any symptoms, they were directed to seek care from their PCP, given contact information to establish with a doctor, directed to an urgent care or the emergency room.

## 2020-12-02 NOTE — PATIENT INSTRUCTIONS

## 2020-12-02 NOTE — TELEPHONE ENCOUNTER
----- Message from [facility] [place], operated by [facility] sent at 12/2/2020 11:15 AM EST -----  Subject: Results Request    QUESTIONS  Which lab or imaging result is the patient calling about? Covid Test  Which provider ordered the test? Ethel Martin A Day   At what location was the test performed? Date the test was performed? 2020-12-02  Additional Information for Provider?   ---------------------------------------------------------------------------  --------------  CALL BACK INFO  What is the best way for the office to contact you? OK to leave message on   voicemail  Preferred Call Back Phone Number?  [phone]

## 2020-12-04 LAB — SARS-COV-2, NAA: DETECTED

## 2021-09-16 ENCOUNTER — OFFICE VISIT (OUTPATIENT)
Dept: INTERNAL MEDICINE CLINIC | Age: 66
End: 2021-09-16
Payer: COMMERCIAL

## 2021-09-16 VITALS
BODY MASS INDEX: 31.14 KG/M2 | HEART RATE: 75 BPM | DIASTOLIC BLOOD PRESSURE: 78 MMHG | OXYGEN SATURATION: 97 % | WEIGHT: 217 LBS | SYSTOLIC BLOOD PRESSURE: 122 MMHG

## 2021-09-16 DIAGNOSIS — I25.10 CORONARY ARTERY DISEASE INVOLVING NATIVE CORONARY ARTERY OF NATIVE HEART WITHOUT ANGINA PECTORIS: ICD-10-CM

## 2021-09-16 DIAGNOSIS — M17.0 PRIMARY OSTEOARTHRITIS OF BOTH KNEES: ICD-10-CM

## 2021-09-16 DIAGNOSIS — M79.671 PAIN IN BOTH FEET: ICD-10-CM

## 2021-09-16 DIAGNOSIS — E78.5 DYSLIPIDEMIA: Primary | ICD-10-CM

## 2021-09-16 DIAGNOSIS — R21 RASH: ICD-10-CM

## 2021-09-16 DIAGNOSIS — H02.402 PTOSIS OF LEFT EYELID: ICD-10-CM

## 2021-09-16 DIAGNOSIS — M79.672 PAIN IN BOTH FEET: ICD-10-CM

## 2021-09-16 DIAGNOSIS — B35.1 TOENAIL FUNGUS: ICD-10-CM

## 2021-09-16 DIAGNOSIS — G70.00 MG (MYASTHENIA GRAVIS) (HCC): ICD-10-CM

## 2021-09-16 PROCEDURE — 99215 OFFICE O/P EST HI 40 MIN: CPT | Performed by: INTERNAL MEDICINE

## 2021-09-16 RX ORDER — KETOCONAZOLE 20 MG/G
CREAM TOPICAL
Qty: 30 G | Refills: 0 | Status: SHIPPED | OUTPATIENT
Start: 2021-09-16

## 2021-09-16 RX ORDER — ATORVASTATIN CALCIUM 80 MG/1
80 TABLET, FILM COATED ORAL DAILY
Qty: 90 TABLET | Refills: 1 | Status: SHIPPED | OUTPATIENT
Start: 2021-09-16

## 2021-09-16 SDOH — ECONOMIC STABILITY: FOOD INSECURITY: WITHIN THE PAST 12 MONTHS, THE FOOD YOU BOUGHT JUST DIDN'T LAST AND YOU DIDN'T HAVE MONEY TO GET MORE.: NEVER TRUE

## 2021-09-16 SDOH — ECONOMIC STABILITY: FOOD INSECURITY: WITHIN THE PAST 12 MONTHS, YOU WORRIED THAT YOUR FOOD WOULD RUN OUT BEFORE YOU GOT MONEY TO BUY MORE.: NEVER TRUE

## 2021-09-16 ASSESSMENT — PATIENT HEALTH QUESTIONNAIRE - PHQ9
2. FEELING DOWN, DEPRESSED OR HOPELESS: 0
1. LITTLE INTEREST OR PLEASURE IN DOING THINGS: 0
SUM OF ALL RESPONSES TO PHQ QUESTIONS 1-9: 0
SUM OF ALL RESPONSES TO PHQ9 QUESTIONS 1 & 2: 0

## 2021-09-16 ASSESSMENT — SOCIAL DETERMINANTS OF HEALTH (SDOH): HOW HARD IS IT FOR YOU TO PAY FOR THE VERY BASICS LIKE FOOD, HOUSING, MEDICAL CARE, AND HEATING?: NOT HARD AT ALL

## 2021-09-18 NOTE — PROGRESS NOTES
Lisa Driver 61 y.o. male presents today for an Established patient for   Chief Complaint   Patient presents with    Numbness     in both feet for about a year    Other     spot on lower right leg            ASSESSMENT/PLAN    1. Dyslipidemia                 Borderline elevated triglycerides. Discussed Mediterranean diet. We will repeat fasting laboratory studies. - atorvastatin (LIPITOR) 80 MG tablet; Take 1 tablet by mouth daily For high cholesterol  Dispense: 90 tablet; Refill: 1  - Lipid Panel; Future  - Comprehensive Metabolic Panel; Future    2. Primary osteoarthritis of both knees                Baseline. 3. Rash                   Appears to be fungal in nature. Will use Nizoral cream  - ketoconazole (NIZORAL) 2 % cream; Apply topically daily. Dispense: 30 g; Refill: 0    4. Pain in both feet                 Patient complains of pain usually in the morning when he first starts to walk. He does have high arches. Presently seeing podiatrist Dr. Brooklynn Asencio for toenail fungus encouraged him to follow-up concerning his foot pain. 5. Toenail fungus                Following up with podiatry    6. MG (myasthenia gravis) ([address]                     Asymptomatic at this time. This was diagnosed several years ago when he was visiting his son at [place]    7. Ptosis of left eyelid                    Asymptomatic at this time. 8.  Coronary artery disease. Followed by [facility] cardiology. No complaint of chest pain or shortness of breath at this time  Return in about 6 months (around 3/16/2022) for lipids   . I spent greater than 45 minutes with this patient face-to-face evaluating his multiple medical concerns including coronary artery disease, hyperlipidemia with borderline elevated triglycerides, myasthenia gravis and left eyelid ptosis, osteoarthritis, and question of peripheral neuropathy. Dr. Gonzalo Rodriguez  HPI:               Review last office visit with me: 1/13/2020. Hyperlipidemia. Elevated PSA referred to urology.  counseled on diet. Osteoarthritis of the knees. Health maintenance: Shingles, Td.  Review laboratory data 12/2/2020 + for COVID-19.  9/15/2020 epidural injection right lumbar 3, 4, 5 Dr. Bajwa Officer. 8/18/2021 office visit [facility] cardiology over back NP DX CAD, hyperlipidemia. Office visit: 4/2/2020 urology Dr. Reinaldo Parra. DX renal cyst.  Renal calculi. Elevated PSA  Meds: Upon review is on Lipitor 80 mg once a day per cardiology Dr. Morales Grew. He is off Pravachol. Review laboratory data [facility] 9/15/2021 lipid panel: Total cholesterol: 147. LDL cholesterol: 61. HDL cholesterol 49. Triglycerides: 187  Patient notes itchy rash about his right lateral lower extremity. Patient complains of bilateral foot pain for years. Has seen Dr. Leandra Hunter of podiatry and was only addressing his toenail fungus. Encouraged him to talk to about his foot pain. Results for orders placed or performed in visit on 12/02/20   COVID-19   Result Value Ref Range    SARS-CoV-2, PRISCILLA DETECTED (A) NOT DETECTED              ROS:  Review of Systems   Constitutional: negative   HENT: negative   EYES: negative   Respiratory: negative   Gastrointestinal: negative   Endocrine: negative   Musculoskeletal: Bilateral foot pain. Lumbar back pain status post epidural.  Skin: Rash right lateral lower extremity  Allergic/Immunological: negative   Hematological: negative   Psychiatric/Behavorial: negative   CV: Coronary artery disease Dr. Morales Grew off Pravachol on Lipitor. CNS: negative   : Elevated PSA seen by urology Dr. Reinaldo Parra  S/E:Negative  Renal: Negative     Physical Exam:  Head/neck: Ears: Normal TM. No obstruction. Throat: Mask. Not examined. Thyroid is nonpalpable. .  Neck: No lymphadenopathy. Eyes: EOMI, PERRLA with no nystagmus  Lungs: Clear to auscultation. CV: S1-S2 normal.   RAJENDRA murmur into left carotid Abdominal Examination: Bowel sounds present. Soft nontender.   No mass no guarding or rebound. Spine/extremities: No edema. No tenderness to palpation. Significant toenail fungus bilaterally  Skin: Large patch with bright  Red border pale in the center no blisters no open areas right lateral lower extremity. CNS: Patient is alert, cooperative, moves all 4 limbs, ambulates without difficulty, light touch normal.  Fair historian. Good orientation. Foot Exam: Pulses palpable. No open area. Thickened yellow toenails bilaterally involving all the nails. High arches bilaterally. Some tenderness to palpation in this area.   Light touch is normal.  Vitals:    09/16/21 0739   BP: 122/78   Pulse: 75   SpO2: 97%        Saint Blizzard, MD

## 2022-03-07 ENCOUNTER — HOSPITAL ENCOUNTER (OUTPATIENT)
Age: 67
Discharge: HOME | End: 2022-03-07
Payer: COMMERCIAL

## 2022-03-07 DIAGNOSIS — E78.5 DYSLIPIDEMIA: ICD-10-CM

## 2022-03-07 LAB
A/G RATIO: 1.9 (ref 1.1–2.2)
ALBUMIN SERPL-MCNC: 4.5 G/DL (ref 3.4–5)
ALP BLD-CCNC: 77 U/L (ref 40–129)
ALT SERPL-CCNC: 50 U/L (ref 10–40)
ANION GAP SERPL CALCULATED.3IONS-SCNC: 13 MMOL/L (ref 3–16)
AST SERPL-CCNC: 49 U/L (ref 15–37)
BILIRUB SERPL-MCNC: 0.6 MG/DL (ref 0–1)
BUN BLDV-MCNC: 15 MG/DL (ref 7–20)
CALCIUM SERPL-MCNC: 9.9 MG/DL (ref 8.3–10.6)
CHLORIDE BLD-SCNC: 101 MMOL/L (ref 99–110)
CHOLESTEROL, TOTAL: 141 MG/DL (ref 0–199)
CO2: 25 MMOL/L (ref 21–32)
CREAT SERPL-MCNC: 0.9 MG/DL (ref 0.8–1.3)
GFR AFRICAN AMERICAN: >60
GFR NON-AFRICAN AMERICAN: >60
GLUCOSE BLD-MCNC: 122 MG/DL (ref 70–99)
HDLC SERPL-MCNC: 56 MG/DL (ref 40–60)
LDL CHOLESTEROL CALCULATED: 58 MG/DL
POTASSIUM SERPL-SCNC: 5 MMOL/L (ref 3.5–5.1)
SODIUM BLD-SCNC: 139 MMOL/L (ref 136–145)
TOTAL PROTEIN: 6.9 G/DL (ref 6.4–8.2)
TRIGL SERPL-MCNC: 133 MG/DL (ref 0–150)
VLDLC SERPL CALC-MCNC: 27 MG/DL

## 2022-03-07 PROCEDURE — 80053 COMPREHEN METABOLIC PANEL: CPT

## 2022-03-07 PROCEDURE — 80061 LIPID PANEL: CPT

## 2022-03-07 PROCEDURE — 36415 COLL VENOUS BLD VENIPUNCTURE: CPT

## 2022-05-04 NOTE — PROGRESS NOTES
Frank Rivas Anselmofarrah 74 y.o. male presents today for an Established patient for   Chief Complaint   Patient presents with    Cholesterol Problem    Discuss Labs    Joint Pain     hips, knees, feet,     Immunizations     awear due      1. Coronary artery disease involving native coronary artery of native heart without angina pectoris             no complaint of chest pain or shortness of breath    2. Dyslipidemia                 Obtain fasting laboratory studies  - Comprehensive Metabolic Panel; Future    3. MG (myasthenia gravis) ([address]              stable at this time. 4. Pain in both feet               chronic problem probably osteo-arthritis in nature. Referred to orthopedics    5. Chronic pain of both knees                chronic problem probably osteo-arthritis in nature. Referred to orthopedics    6. Bilateral hip pain                  chronic problem. Probably osteoarthritis in nature. Referred to orthopedics    7. Elevated LFTs                  decrease alcohol consumption. Repeat lab    8. Special screening for malignant neoplasm of prostate               health maintenance. - PSA Screening; Future    9. IFG (impaired fasting glucose)                   low sugar diet. - Hemoglobin A1C; Future    10. Dissection, vertebral artery (HCC)                 continue to monitor. HPI: Review last office visit with me 9/18/2021. Patient with hyperlipidemia borderline elevated triglycerides discussed Mediterranean diet use Lipitor 80 mg once a day, osteoarthritis both knees, rash treated with ketoconazole, pain about both feet has seen podiatrist Dr. Frankie Puentes for toenail fungus encouraged him to follow-up concerning foot pain. Myasthenia gravis diagnosed when visiting his son in [place], ptosis of left eyelid asymptomatic at this time. Coronary artery disease encouraged to follow-up with [facility] cardiology. Medicines reviewed. Reviewed laboratory data 3/7/2022.   Chemistry panel with glucose 122, ALT 50. AST 49. Lipid panel: Total cholesterol 141. LDL cholesterol 58. Health maintenance reviewed: Shingles: Td: PSA, pneumonia 20  Office visit: 8/18/2021: [facility] cardiology NP Over Back: CAD stable. Complains of knees and hips pain for 6 months. Discomfort worse with walking up steps. Sees [facility] for the back pain needs to discuss his knees and hips. Results for orders placed or performed during the hospital encounter of 03/07/22   Comprehensive Metabolic Panel   Result Value Ref Range    Sodium 139 136 - 145 mmol/L    Potassium 5.0 3.5 - 5.1 mmol/L    Chloride 101 99 - 110 mmol/L    CO2 25 21 - 32 mmol/L    Anion Gap 13 3 - 16    Glucose 122 (H) 70 - 99 mg/dL    BUN 15 7 - 20 mg/dL    CREATININE 0.9 0.8 - 1.3 mg/dL    GFR Non-African American >60 >60    GFR African American >60 >60    Calcium 9.9 8.3 - 10.6 mg/dL    Total Protein 6.9 6.4 - 8.2 g/dL    Albumin 4.5 3.4 - 5.0 g/dL    Albumin/Globulin Ratio 1.9 1.1 - 2.2    Total Bilirubin 0.6 0.0 - 1.0 mg/dL    Alkaline Phosphatase 77 40 - 129 U/L    ALT 50 (H) 10 - 40 U/L    AST 49 (H) 15 - 37 U/L   Lipid Panel   Result Value Ref Range    Cholesterol, Total 141 0 - 199 mg/dL    Triglycerides 133 0 - 150 mg/dL    HDL 56 40 - 60 mg/dL    LDL Calculated 58 <100 mg/dL    VLDL Cholesterol Calculated 27 Not Established mg/dL              ROS:  Review of Systems   Constitutional: negative   HENT: negative   EYES: negative   Respiratory: negative   Gastrointestinal: Elevated LFTs. Endocrine: Glucose 122. Musculoskeletal: negative   Skin: negative   Allergic/Immunological: negative   Hematological: negative   Psychiatric/Behavorial: negative   CV: negative   CNS: negative   :Negative   S/E:Negative  Renal: Negative     Physical Exam:  Head/neck: Ears: Normal TM. No obstruction. Throat: Mask not examined. Thyroids not palpable. Neck: No lymphadenopathy. Eyes: EOMI, PERRLA with no nystagmus  Lungs: Clear to auscultation.   CV: S1-S2 normal.  No murmur. Carotid: No bruit. Abdominal Examination: Bowel sounds present. Soft nontender. No mass no guarding or   rebound. Spine/extremities: No edema. No tenderness to palpation. Skin: No rash  CNS: Patient is alert, cooperative, moves all 4 limbs, ambulates without difficulty, light touch normal.  Fairly good historian. Good orientation. Blood pressure 120/76, pulse 73, temperature 97.7 °F (36.5 °C), temperature source Temporal, weight 219 lb 3.2 oz (99.4 kg), SpO2 99 %.          Karen Steven MD

## 2022-05-05 ENCOUNTER — OFFICE VISIT (OUTPATIENT)
Dept: INTERNAL MEDICINE CLINIC | Age: 67
End: 2022-05-05
Payer: MEDICARE

## 2022-05-05 VITALS
SYSTOLIC BLOOD PRESSURE: 120 MMHG | OXYGEN SATURATION: 99 % | TEMPERATURE: 97.7 F | BODY MASS INDEX: 31.45 KG/M2 | HEART RATE: 73 BPM | WEIGHT: 219.2 LBS | DIASTOLIC BLOOD PRESSURE: 76 MMHG

## 2022-05-05 DIAGNOSIS — R79.89 ELEVATED LFTS: ICD-10-CM

## 2022-05-05 DIAGNOSIS — I25.10 CORONARY ARTERY DISEASE INVOLVING NATIVE CORONARY ARTERY OF NATIVE HEART WITHOUT ANGINA PECTORIS: Primary | ICD-10-CM

## 2022-05-05 DIAGNOSIS — M25.552 BILATERAL HIP PAIN: ICD-10-CM

## 2022-05-05 DIAGNOSIS — G70.00 MG (MYASTHENIA GRAVIS) (HCC): ICD-10-CM

## 2022-05-05 DIAGNOSIS — M79.671 PAIN IN BOTH FEET: ICD-10-CM

## 2022-05-05 DIAGNOSIS — M79.672 PAIN IN BOTH FEET: ICD-10-CM

## 2022-05-05 DIAGNOSIS — R73.01 IFG (IMPAIRED FASTING GLUCOSE): ICD-10-CM

## 2022-05-05 DIAGNOSIS — M25.551 BILATERAL HIP PAIN: ICD-10-CM

## 2022-05-05 DIAGNOSIS — M25.561 CHRONIC PAIN OF BOTH KNEES: ICD-10-CM

## 2022-05-05 DIAGNOSIS — Z12.5 SPECIAL SCREENING FOR MALIGNANT NEOPLASM OF PROSTATE: ICD-10-CM

## 2022-05-05 DIAGNOSIS — I77.74 DISSECTION, VERTEBRAL ARTERY (HCC): ICD-10-CM

## 2022-05-05 DIAGNOSIS — M25.562 CHRONIC PAIN OF BOTH KNEES: ICD-10-CM

## 2022-05-05 DIAGNOSIS — G89.29 CHRONIC PAIN OF BOTH KNEES: ICD-10-CM

## 2022-05-05 DIAGNOSIS — E78.5 DYSLIPIDEMIA: ICD-10-CM

## 2022-05-05 PROCEDURE — 99214 OFFICE O/P EST MOD 30 MIN: CPT | Performed by: INTERNAL MEDICINE

## 2022-05-05 RX ORDER — ACETAMINOPHEN AND CODEINE PHOSPHATE 300; 30 MG/1; MG/1
TABLET ORAL
COMMUNITY
Start: 2022-04-10

## 2022-08-11 ENCOUNTER — HOSPITAL ENCOUNTER (OUTPATIENT)
Age: 67
Discharge: HOME | End: 2022-08-11
Payer: MEDICARE

## 2022-08-11 LAB
ALBUMIN SERPL-MCNC: 3.9 G/DL (ref 3.4–5)
ALP BLD-CCNC: 78 U/L (ref 40–129)
ALT SERPL-CCNC: 36 U/L (ref 10–40)
AMMONIA: 29 UMOL/L (ref 16–60)
AST SERPL-CCNC: 31 U/L (ref 15–37)
BILIRUB SERPL-MCNC: 0.5 MG/DL (ref 0–1)
BILIRUBIN DIRECT: <0.2 MG/DL (ref 0–0.3)
BILIRUBIN, INDIRECT: NORMAL MG/DL (ref 0–1)
FOLATE: >20 NG/ML (ref 4.78–24.2)
HOMOCYSTEINE: 6 UMOL/L (ref 0–10)
IGA: 223 MG/DL (ref 70–400)
TOTAL PROTEIN: 6.7 G/DL (ref 6.4–8.2)
TSH SERPL DL<=0.05 MIU/L-ACNC: 1.69 UIU/ML (ref 0.27–4.2)
VITAMIN B-12: 1028 PG/ML (ref 211–911)

## 2022-08-11 PROCEDURE — 83090 ASSAY OF HOMOCYSTEINE: CPT

## 2022-08-11 PROCEDURE — 83825 ASSAY OF MERCURY: CPT

## 2022-08-11 PROCEDURE — 82164 ANGIOTENSIN I ENZYME TEST: CPT

## 2022-08-11 PROCEDURE — 84443 ASSAY THYROID STIM HORMONE: CPT

## 2022-08-11 PROCEDURE — 83036 HEMOGLOBIN GLYCOSYLATED A1C: CPT

## 2022-08-11 PROCEDURE — 82607 VITAMIN B-12: CPT

## 2022-08-11 PROCEDURE — 86235 NUCLEAR ANTIGEN ANTIBODY: CPT

## 2022-08-11 PROCEDURE — 80076 HEPATIC FUNCTION PANEL: CPT

## 2022-08-11 PROCEDURE — 82525 ASSAY OF COPPER: CPT

## 2022-08-11 PROCEDURE — 84207 ASSAY OF VITAMIN B-6: CPT

## 2022-08-11 PROCEDURE — 86618 LYME DISEASE ANTIBODY: CPT

## 2022-08-11 PROCEDURE — 84155 ASSAY OF PROTEIN SERUM: CPT

## 2022-08-11 PROCEDURE — 82140 ASSAY OF AMMONIA: CPT

## 2022-08-11 PROCEDURE — 82784 ASSAY IGA/IGD/IGG/IGM EACH: CPT

## 2022-08-11 PROCEDURE — 36415 COLL VENOUS BLD VENIPUNCTURE: CPT

## 2022-08-11 PROCEDURE — 83655 ASSAY OF LEAD: CPT

## 2022-08-11 PROCEDURE — 84182 PROTEIN WESTERN BLOT TEST: CPT

## 2022-08-11 PROCEDURE — 86334 IMMUNOFIX E-PHORESIS SERUM: CPT

## 2022-08-11 PROCEDURE — 82746 ASSAY OF FOLIC ACID SERUM: CPT

## 2022-08-11 PROCEDURE — 82175 ASSAY OF ARSENIC: CPT

## 2022-08-11 PROCEDURE — 80074 ACUTE HEPATITIS PANEL: CPT

## 2022-08-11 PROCEDURE — 84165 PROTEIN E-PHORESIS SERUM: CPT

## 2022-08-11 PROCEDURE — 83516 IMMUNOASSAY NONANTIBODY: CPT

## 2022-08-11 PROCEDURE — 84446 ASSAY OF VITAMIN E: CPT

## 2022-08-12 LAB
ALBUMIN SERPL-MCNC: 3.5 G/DL (ref 3.1–4.9)
ALPHA-1-GLOBULIN: 0.3 G/DL (ref 0.2–0.4)
ALPHA-2-GLOBULIN: 0.7 G/DL (ref 0.4–1.1)
ANTI-SS-A IGG: <0.2 AI (ref 0–0.9)
ANTI-SS-B IGG: <0.2 AI (ref 0–0.9)
BETA GLOBULIN: 1.1 G/DL (ref 0.9–1.6)
ESTIMATED AVERAGE GLUCOSE: 128.4 MG/DL
GAMMA GLOBULIN: 1.1 G/DL (ref 0.6–1.8)
HBA1C MFR BLD: 6.1 %
TISSUE TRANSGLUTAMINASE IGA: 0.9 U/ML (ref 0–14)

## 2022-08-13 LAB
ANGIOTENSIN CONVERTING ENZYME: 37 U/L (ref 16–85)
COPPER: 88.3 UG/DL (ref 70–140)
HAV IGM SER IA-ACNC: NORMAL
HEPATITIS B CORE IGM ANTIBODY: NORMAL
HEPATITIS B SURFACE ANTIGEN INTERPRETATION: NORMAL
HEPATITIS C ANTIBODY INTERPRETATION: NORMAL
LYME, EIA: 0.13 LIV (ref 0–1.2)

## 2022-08-14 LAB
ALPHA-TOCOPHEROL: 10.1 MG/L (ref 5.5–18)
ARSENIC BLOOD: <10 UG/L
GAMMA-TOCOPHEROL: 1.7 MG/L (ref 0–6)
LEAD LEVEL BLOOD: <2 UG/DL
MERCURY BLOOD: 3.6 UG/L
MISCELLANEOUS LAB TEST ORDER: NORMAL

## 2022-08-17 LAB
SPE/IFE INTERPRETATION: NORMAL
VITAMIN B6: 70.1 NMOL/L (ref 20–125)

## 2022-09-18 NOTE — PROGRESS NOTES
Joya Driver 74 y.o. male presents today for an Established patient for   Chief Complaint   Patient presents with    Follow-up            ASSESSMENT/PLAN    1. Coronary artery disease involving native coronary artery of native heart without angina pectoris           No chest pain or shortness of breath. Cholesterol profile  - Lipid Panel; Future  - Lipid Panel; Future  - Comprehensive Metabolic Panel; Future    2. MG (myasthenia gravis) ([address]              Diagnosed when he was in [place] on a trip. 3. Dissection, vertebral artery [facility])             Consult vascular surgeon    4. Dyslipidemia                   Needs fasting laboratory studies    5. Primary osteoarthritis of both knees             Seen by orthopedics                                    6. IFG (impaired fasting glucose)       Glucose 109. Discussed low sugar diet               7. Pain in both feet                   Follow-up with podiatry    8. Cough                   Evaluate cough: Has Flonase nasal spray. May be postnasal drip? Continue with Zantac.  - XR CHEST (2 VW); Future    9. Need for influenza vaccination         Health maintenance      - Influenza, FLUAD, (age 72 y+), IM, Preservative Free, 0.5 mL    10. Need for pneumococcal vaccination                 - Pneumococcal, PCV20, PREVNAR 21, (age 25 yrs+), IM, PF     I spent greater than 45 minutes with this patient face-to-face. We discussed several medical problems. Patient with persistent cough for the last year 1 1/2-2 years. Will evaluate with chest x-ray. Then consider CT scan of chest if needed. Lipid panel with elevated triglycerides. Discussed low sugar low-carb weight reduction diet. Glucose 109 slightly elevated. Discussed low sugar low-carb diet. History of myasthenia gravis which appears to be stable at this time. Reviewed patient was significant alcohol consumption and discussed tapering consumption. .  Patient is seen Dr. Ange Cramer neuroscience concerning idiopathic peripheral neuropathy. Reviewed work-up which was unrevealing. Return follow-up  Dr. Benjamin Powers      Return in about 6 months (around 3/19/2023) for LIPIDS    MG.     HPI:          Reviewed last office visit with me: 5/5/2022: Patient with coronary artery disease no complaint of chest pain or shortness of breath. Hyperlipidemia, myasthenia gravis stable, pain in both feet chronic problem referral to orthopedics. Chronic knee pain referral to orthopedics. Bilateral hip pain chronic problem referral to orthopedics. Elevated LFTs discussed need to decrease alcohol consumption. IFG discussed low sugar diet. Dissection vertebral artery continue to monitor. Medicines and allergies reviewed. Reviewed laboratory data: Folate greater than 20. B12 1028. Homocystine 6. Liver function tests were normal.  A1c 6.1. TSH 1.69. Arsenic lead and mercury tox screen negative. Copper and B6 plasma levels unremarkable. Hepatitis panel negative. Health maintenance reviewed: Shingles vaccine. Td.  Pneumonia 20. Reviewed office visit: 5/6/2022. Dr. Sumaya GARCIA [facility] Cardiology complains of dyspnea ordered exercise stress echocardiogram  Reviewed office visit: 5/9/2022 Dr. Maximus Rivas PMR: Complaint of acute bilateral low back pain with sciatica bilateral foot drop. Obtained MRI lumbar spine, Zanaflex, prednisone. 5/18/2022 MRI lumbar spine: Conclusion L4-5 left foraminal narrowing mildly abutting L4 nerve root. Concentric disc bulge no spinal stenosis. L3-4 concentric disc displacement mildly abutting descending L4 nerve roots. L2-3 x 4 foraminal disc displacement mildly abutting L3 nerve roots in the lateral recesses  Office visit 8/9/2022 [facility] neuroscience. Dr. Cantor Mediate.  DX idiopathic peripheral neuropathy     Today:        Failed to obtain laboratory studies before office visit. For the last 1-1/2-2 years complains of cough at night after dinner. No dysphagia tickling sensation.   Will get paroxysms of coughing then none for days. No sore throat. No fever chills or shakes. No nasal congestion or drainage. Denies significant allergy component. Does take Zyrtec on a regular basis. Complains of back pain. Results for orders placed or performed during the hospital encounter of 08/11/22   Angiotensin Converting Enzyme   Result Value Ref Range    Angio Convert Enzyme 37 16 - 85 U/L   Ammonia   Result Value Ref Range    Ammonia 29 16 - 60 umol/L   Anti SSA   Result Value Ref Range    Anti-SS-A IgG <0.2 0.0 - 0.9 AI   Anti SSB   Result Value Ref Range    Anti-SS-B IgG <0.2 0.0 - 0.9 AI   B.  Burgdorferi Antibodies   Result Value Ref Range    Lyme, EIA 0.13 0.00 - 1.20 JAVED   Copper, Serum   Result Value Ref Range    Copper 88.3 70.0 - 140.0 ug/dL   Hepatitis Panel, Acute   Result Value Ref Range    Hep A IgM Non-reactive Non-reactive    Hep B Core Ab, IgM Non-reactive Non-reactive    Hep B S Ag Interp Non-reactive Non-reactive    Hep C Ab Interp Non-reactive Non-reactive   Vitamin E   Result Value Ref Range    Alpha-Tocopherol 10.1 5.5 - 18.0 mg/L    Gamma-Tocopherol 1.7 0.0 - 6.0 mg/L   Vitamin B6   Result Value Ref Range    Vitamin B6, Plasma 70.1 20.0 - 125.0 nmol/L   Heavy Metals, Blood   Result Value Ref Range    Lead <2.0 <=4.9 ug/dL    Arsenic <10.0 <=12.0 ug/L    Mercury 3.6 <=10.0 ug/L   Hepatic Function Panel   Result Value Ref Range    Total Protein 6.7 6.4 - 8.2 g/dL    Albumin 3.9 3.4 - 5.0 g/dL    Alkaline Phosphatase 78 40 - 129 U/L    ALT 36 10 - 40 U/L    AST 31 15 - 37 U/L    Total Bilirubin 0.5 0.0 - 1.0 mg/dL    Bilirubin, Direct <0.2 0.0 - 0.3 mg/dL    Bilirubin, Indirect see below 0.0 - 1.0 mg/dL   Celiac Screen with Reflex   Result Value Ref Range    IgA 223.0 70.0 - 400.0 mg/dL   Vitamin B12 & Folate   Result Value Ref Range    Vitamin B-12 1028 (H) 211 - 911 pg/mL    Folate >20.00 4.78 - 24.20 ng/mL   TSH   Result Value Ref Range    TSH 1.69 0.27 - 4.20 uIU/mL   Immunofixation serum profile   Result Value Ref Range    Albumin 3.5 3.1 - 4.9 g/dL    Alpha-1-Globulin 0.3 0.2 - 0.4 g/dL    Alpha-2-Globulin 0.7 0.4 - 1.1 g/dL    Beta Globulin 1.1 0.9 - 1.6 g/dL    Gamma Globulin 1.1 0.6 - 1.8 g/dL   Hemoglobin A1C   Result Value Ref Range    Hemoglobin A1C 6.1 See comment %    eAG 128.4 mg/dL   MISC ARUP 1   Result Value Ref Range    Misc Test Order SEE NOTE    Homocysteine   Result Value Ref Range    Homocysteine 6 0 - 10 umol/L   Tissue Transglutaminase, IgA   Result Value Ref Range    Tissue Transglutaminase IgA 0.9 0.0 - 14.0 U/mL   Pathology Interpretation   Result Value Ref Range    SPE/ASAD Interpretation REVIEWED               ROS:  Review of Systems   Constitutional: negative   HENT:    ?  Postnasal drip causing cough. EYES: negative   Respiratory: Cough. Obtain chest x-ray. Gastrointestinal: negative   Endocrine: negative   Musculoskeletal: negative   Skin: negative   Allergic/Immunological: negative   Hematological: negative   Psychiatric/Behavorial: negative   CV: negative   CNS: negative   :Negative   S/E:Negative  Renal: Negative     Physical Exam:  Head/neck: Ears: Normal TM. No obstruction. Throat: Mask. Not examined. Thyroid is not palpable. .  Neck: No lymphadenopathy. Eyes: EOMI, PERRLA with no nystagmus  Lungs: Clear to auscultation. CV: S1-S2 normal.  No murmur. Carotid: No bruit. Abdominal Examination: Bowel sounds present. Soft nontender. No mass no guarding or   rebound. Spine/extremities: No edema. No tenderness to palpation. Skin: No rash  CNS: Patient is alert, cooperative, moves all 4 limbs, ambulates without difficulty, light touch normal.  Fairly good historian. Good orientation. Blood pressure 120/74, pulse 96, temperature 97.9 °F (36.6 °C), temperature source Temporal, weight 216 lb 3.2 oz (98.1 kg), SpO2 99 %.        Marcelina Fontaine MD

## 2022-09-19 ENCOUNTER — OFFICE VISIT (OUTPATIENT)
Dept: INTERNAL MEDICINE CLINIC | Age: 67
End: 2022-09-19
Payer: MEDICARE

## 2022-09-19 VITALS
DIASTOLIC BLOOD PRESSURE: 74 MMHG | WEIGHT: 216.2 LBS | BODY MASS INDEX: 31.93 KG/M2 | OXYGEN SATURATION: 99 % | TEMPERATURE: 97.9 F | HEART RATE: 96 BPM | SYSTOLIC BLOOD PRESSURE: 120 MMHG

## 2022-09-19 DIAGNOSIS — Z23 NEED FOR INFLUENZA VACCINATION: ICD-10-CM

## 2022-09-19 DIAGNOSIS — G70.00 MG (MYASTHENIA GRAVIS) (HCC): ICD-10-CM

## 2022-09-19 DIAGNOSIS — E78.5 DYSLIPIDEMIA: ICD-10-CM

## 2022-09-19 DIAGNOSIS — R05.3 CHRONIC COUGH: ICD-10-CM

## 2022-09-19 DIAGNOSIS — Z23 NEED FOR PNEUMOCOCCAL VACCINATION: ICD-10-CM

## 2022-09-19 DIAGNOSIS — M79.671 PAIN IN BOTH FEET: ICD-10-CM

## 2022-09-19 DIAGNOSIS — I77.74 DISSECTION, VERTEBRAL ARTERY (HCC): ICD-10-CM

## 2022-09-19 DIAGNOSIS — M79.672 PAIN IN BOTH FEET: ICD-10-CM

## 2022-09-19 DIAGNOSIS — I25.10 CORONARY ARTERY DISEASE INVOLVING NATIVE CORONARY ARTERY OF NATIVE HEART WITHOUT ANGINA PECTORIS: Primary | ICD-10-CM

## 2022-09-19 DIAGNOSIS — M17.0 PRIMARY OSTEOARTHRITIS OF BOTH KNEES: ICD-10-CM

## 2022-09-19 DIAGNOSIS — R73.01 IFG (IMPAIRED FASTING GLUCOSE): ICD-10-CM

## 2022-09-19 PROCEDURE — 90694 VACC AIIV4 NO PRSRV 0.5ML IM: CPT | Performed by: INTERNAL MEDICINE

## 2022-09-19 PROCEDURE — G0008 ADMIN INFLUENZA VIRUS VAC: HCPCS | Performed by: INTERNAL MEDICINE

## 2022-09-19 PROCEDURE — 99215 OFFICE O/P EST HI 40 MIN: CPT | Performed by: INTERNAL MEDICINE

## 2022-09-19 PROCEDURE — 90677 PCV20 VACCINE IM: CPT | Performed by: INTERNAL MEDICINE

## 2022-09-19 PROCEDURE — 1123F ACP DISCUSS/DSCN MKR DOCD: CPT | Performed by: INTERNAL MEDICINE

## 2022-09-19 PROCEDURE — G0009 ADMIN PNEUMOCOCCAL VACCINE: HCPCS | Performed by: INTERNAL MEDICINE

## 2022-09-19 SDOH — ECONOMIC STABILITY: FOOD INSECURITY: WITHIN THE PAST 12 MONTHS, THE FOOD YOU BOUGHT JUST DIDN'T LAST AND YOU DIDN'T HAVE MONEY TO GET MORE.: NEVER TRUE

## 2022-09-19 SDOH — ECONOMIC STABILITY: FOOD INSECURITY: WITHIN THE PAST 12 MONTHS, YOU WORRIED THAT YOUR FOOD WOULD RUN OUT BEFORE YOU GOT MONEY TO BUY MORE.: NEVER TRUE

## 2022-09-19 ASSESSMENT — SOCIAL DETERMINANTS OF HEALTH (SDOH): HOW HARD IS IT FOR YOU TO PAY FOR THE VERY BASICS LIKE FOOD, HOUSING, MEDICAL CARE, AND HEATING?: NOT HARD AT ALL

## 2022-09-21 ENCOUNTER — TELEPHONE (OUTPATIENT)
Dept: INTERNAL MEDICINE CLINIC | Age: 67
End: 2022-09-21

## 2022-09-21 ENCOUNTER — HOSPITAL ENCOUNTER (OUTPATIENT)
Age: 67
Discharge: HOME | End: 2022-09-21
Payer: MEDICARE

## 2022-09-21 ENCOUNTER — HOSPITAL ENCOUNTER (OUTPATIENT)
Dept: GENERAL RADIOLOGY | Age: 67
Discharge: HOME | End: 2022-09-21
Payer: MEDICARE

## 2022-09-21 DIAGNOSIS — R73.01 IFG (IMPAIRED FASTING GLUCOSE): ICD-10-CM

## 2022-09-21 DIAGNOSIS — E78.5 DYSLIPIDEMIA: ICD-10-CM

## 2022-09-21 DIAGNOSIS — Z12.5 SPECIAL SCREENING FOR MALIGNANT NEOPLASM OF PROSTATE: ICD-10-CM

## 2022-09-21 DIAGNOSIS — R91.1 NODULE OF UPPER LOBE OF RIGHT LUNG: Primary | ICD-10-CM

## 2022-09-21 DIAGNOSIS — I25.10 CORONARY ARTERY DISEASE INVOLVING NATIVE CORONARY ARTERY OF NATIVE HEART WITHOUT ANGINA PECTORIS: ICD-10-CM

## 2022-09-21 DIAGNOSIS — R05.9 COUGH: ICD-10-CM

## 2022-09-21 LAB
A/G RATIO: 1.9 (ref 1.1–2.2)
ALBUMIN SERPL-MCNC: 4.3 G/DL (ref 3.4–5)
ALP BLD-CCNC: 84 U/L (ref 40–129)
ALT SERPL-CCNC: 37 U/L (ref 10–40)
ANION GAP SERPL CALCULATED.3IONS-SCNC: 11 MMOL/L (ref 3–16)
AST SERPL-CCNC: 29 U/L (ref 15–37)
BILIRUB SERPL-MCNC: 0.5 MG/DL (ref 0–1)
BUN BLDV-MCNC: 16 MG/DL (ref 7–20)
CALCIUM SERPL-MCNC: 9.5 MG/DL (ref 8.3–10.6)
CHLORIDE BLD-SCNC: 105 MMOL/L (ref 99–110)
CHOLESTEROL, TOTAL: 135 MG/DL (ref 0–199)
CO2: 26 MMOL/L (ref 21–32)
CREAT SERPL-MCNC: 1 MG/DL (ref 0.8–1.3)
GFR AFRICAN AMERICAN: >60
GFR NON-AFRICAN AMERICAN: >60
GLUCOSE BLD-MCNC: 109 MG/DL (ref 70–99)
HDLC SERPL-MCNC: 50 MG/DL (ref 40–60)
LDL CHOLESTEROL CALCULATED: 53 MG/DL
POTASSIUM SERPL-SCNC: 4.6 MMOL/L (ref 3.5–5.1)
PROSTATE SPECIFIC ANTIGEN: 4.11 NG/ML (ref 0–4)
SODIUM BLD-SCNC: 142 MMOL/L (ref 136–145)
TOTAL PROTEIN: 6.6 G/DL (ref 6.4–8.2)
TRIGL SERPL-MCNC: 162 MG/DL (ref 0–150)
VLDLC SERPL CALC-MCNC: 32 MG/DL

## 2022-09-21 PROCEDURE — 80053 COMPREHEN METABOLIC PANEL: CPT

## 2022-09-21 PROCEDURE — 71046 X-RAY EXAM CHEST 2 VIEWS: CPT

## 2022-09-21 PROCEDURE — 84153 ASSAY OF PSA TOTAL: CPT

## 2022-09-21 PROCEDURE — 83036 HEMOGLOBIN GLYCOSYLATED A1C: CPT

## 2022-09-21 PROCEDURE — 80061 LIPID PANEL: CPT

## 2022-09-21 PROCEDURE — 36415 COLL VENOUS BLD VENIPUNCTURE: CPT

## 2022-09-22 LAB
ESTIMATED AVERAGE GLUCOSE: 128.4 MG/DL
HBA1C MFR BLD: 6.1 %

## 2022-10-01 ENCOUNTER — HOSPITAL ENCOUNTER (OUTPATIENT)
Dept: CT IMAGING | Age: 67
Discharge: HOME | End: 2022-10-01
Payer: MEDICARE

## 2022-10-01 DIAGNOSIS — R91.1 NODULE OF UPPER LOBE OF RIGHT LUNG: ICD-10-CM

## 2022-10-01 PROCEDURE — 71250 CT THORAX DX C-: CPT

## 2022-10-05 ENCOUNTER — TELEPHONE (OUTPATIENT)
Dept: INTERNAL MEDICINE CLINIC | Age: 67
End: 2022-10-05

## 2022-10-05 NOTE — TELEPHONE ENCOUNTER
10/5/22-Patient notified of CT confirms nodule of right lung. This is likely a benign finding. If he would like he could pursue imaging in 6-12 months to confirm stability, however not entirely necessary.

## 2022-10-05 NOTE — TELEPHONE ENCOUNTER
CT confirms nodule of right lung. This is likely a benign finding.  If he would like he could pursue imaging in 6-12 months to confirm stability, however not entirely necessary

## 2023-01-05 ENCOUNTER — OFFICE VISIT (OUTPATIENT)
Dept: INTERNAL MEDICINE CLINIC | Age: 68
End: 2023-01-05

## 2023-01-05 VITALS
HEART RATE: 84 BPM | SYSTOLIC BLOOD PRESSURE: 116 MMHG | BODY MASS INDEX: 32.31 KG/M2 | TEMPERATURE: 97.3 F | DIASTOLIC BLOOD PRESSURE: 76 MMHG | WEIGHT: 218.8 LBS | OXYGEN SATURATION: 97 %

## 2023-01-05 DIAGNOSIS — M79.672 PAIN IN BOTH FEET: ICD-10-CM

## 2023-01-05 DIAGNOSIS — I77.74 DISSECTION, VERTEBRAL ARTERY (HCC): ICD-10-CM

## 2023-01-05 DIAGNOSIS — M79.671 PAIN IN BOTH FEET: ICD-10-CM

## 2023-01-05 DIAGNOSIS — M25.551 BILATERAL HIP PAIN: ICD-10-CM

## 2023-01-05 DIAGNOSIS — E78.5 DYSLIPIDEMIA: ICD-10-CM

## 2023-01-05 DIAGNOSIS — I25.10 CORONARY ARTERY DISEASE INVOLVING NATIVE CORONARY ARTERY OF NATIVE HEART WITHOUT ANGINA PECTORIS: Primary | ICD-10-CM

## 2023-01-05 DIAGNOSIS — M25.561 CHRONIC PAIN OF BOTH KNEES: ICD-10-CM

## 2023-01-05 DIAGNOSIS — G70.00 MG (MYASTHENIA GRAVIS) (HCC): ICD-10-CM

## 2023-01-05 DIAGNOSIS — R91.1 NODULE OF UPPER LOBE OF RIGHT LUNG: ICD-10-CM

## 2023-01-05 DIAGNOSIS — Z12.11 SPECIAL SCREENING FOR MALIGNANT NEOPLASMS, COLON: ICD-10-CM

## 2023-01-05 DIAGNOSIS — G89.29 CHRONIC PAIN OF BOTH KNEES: ICD-10-CM

## 2023-01-05 DIAGNOSIS — M25.562 CHRONIC PAIN OF BOTH KNEES: ICD-10-CM

## 2023-01-05 DIAGNOSIS — M25.552 BILATERAL HIP PAIN: ICD-10-CM

## 2023-01-05 DIAGNOSIS — R73.01 IFG (IMPAIRED FASTING GLUCOSE): ICD-10-CM

## 2023-01-05 RX ORDER — CELECOXIB 200 MG/1
200 CAPSULE ORAL DAILY
Qty: 30 CAPSULE | Refills: 5 | Status: SHIPPED | OUTPATIENT
Start: 2023-01-05

## 2023-01-05 ASSESSMENT — PATIENT HEALTH QUESTIONNAIRE - PHQ9
SUM OF ALL RESPONSES TO PHQ QUESTIONS 1-9: 0
2. FEELING DOWN, DEPRESSED OR HOPELESS: 0
SUM OF ALL RESPONSES TO PHQ9 QUESTIONS 1 & 2: 0
1. LITTLE INTEREST OR PLEASURE IN DOING THINGS: 0
SUM OF ALL RESPONSES TO PHQ QUESTIONS 1-9: 0

## 2023-01-05 NOTE — PROGRESS NOTES
HENT: negative   EYES: negative   Respiratory: negative   Gastrointestinal: negative   Endocrine: negative   Musculoskeletal: negative   Skin: negative   Allergic/Immunological: negative   Hematological: negative   Psychiatric/Behavorial: negative   CV: Hyperlipidemia. CNS: Myasthenia gravis. :Negative   S/E:Negative  Renal: Negative     Physical Exam:  Head/neck: Ears: Normal TM. No obstruction. Throat: Mask. Not examined. Thyroid is not palpable. Neck: No lymphadenopathy. Eyes: EOMI, PERRLA with no nystagmus  Lungs: Clear to auscultation. CV: S1-S2 normal.  No murmur. Carotid: No bruit. Abdominal Examination: Bowel sounds present. Soft nontender. No mass no guarding or   rebound. Spine/extremities: No edema. No tenderness to palpation. Skin: No rash  CNS: Patient is alert, cooperative, moves all 4 limbs, ambulates without difficulty, light touch normal.  Negative straight leg raising test.   Able to point toes against resistance. Lower extremity deep tendon reflexes symmetrically mildly decreased. Fairly good historian. Good orientation. Blood pressure 116/76, pulse 84, temperature 97.3 °F (36.3 °C), temperature source Temporal, weight 218 lb 12.8 oz (99.2 kg), SpO2 97 %.        Jenna Ricardo MD

## 2023-01-17 ENCOUNTER — OFFICE VISIT (OUTPATIENT)
Dept: ORTHOPEDIC SURGERY | Age: 68
End: 2023-01-17
Payer: MEDICARE

## 2023-01-17 VITALS — WEIGHT: 218 LBS | BODY MASS INDEX: 32.29 KG/M2 | HEIGHT: 69 IN

## 2023-01-17 DIAGNOSIS — M17.0 PRIMARY OSTEOARTHRITIS OF BOTH KNEES: ICD-10-CM

## 2023-01-17 DIAGNOSIS — M25.562 CHRONIC PAIN OF BOTH KNEES: Primary | ICD-10-CM

## 2023-01-17 DIAGNOSIS — G89.29 CHRONIC PAIN OF BOTH KNEES: Primary | ICD-10-CM

## 2023-01-17 DIAGNOSIS — M25.561 CHRONIC PAIN OF BOTH KNEES: Primary | ICD-10-CM

## 2023-01-17 PROCEDURE — 99214 OFFICE O/P EST MOD 30 MIN: CPT | Performed by: PHYSICIAN ASSISTANT

## 2023-01-17 PROCEDURE — 20611 DRAIN/INJ JOINT/BURSA W/US: CPT | Performed by: PHYSICIAN ASSISTANT

## 2023-01-17 PROCEDURE — 1123F ACP DISCUSS/DSCN MKR DOCD: CPT | Performed by: PHYSICIAN ASSISTANT

## 2023-01-17 RX ORDER — TRIAMCINOLONE ACETONIDE 40 MG/ML
40 INJECTION, SUSPENSION INTRA-ARTICULAR; INTRAMUSCULAR ONCE
Status: COMPLETED | OUTPATIENT
Start: 2023-01-17 | End: 2023-01-17

## 2023-01-17 RX ORDER — LIDOCAINE HYDROCHLORIDE 10 MG/ML
20 INJECTION, SOLUTION INFILTRATION; PERINEURAL ONCE
Status: COMPLETED | OUTPATIENT
Start: 2023-01-17 | End: 2023-01-17

## 2023-01-17 RX ORDER — BUPIVACAINE HYDROCHLORIDE 2.5 MG/ML
30 INJECTION, SOLUTION INFILTRATION; PERINEURAL ONCE
Status: COMPLETED | OUTPATIENT
Start: 2023-01-17 | End: 2023-01-17

## 2023-01-17 RX ADMIN — LIDOCAINE HYDROCHLORIDE 20 ML: 10 INJECTION, SOLUTION INFILTRATION; PERINEURAL at 10:56

## 2023-01-17 RX ADMIN — TRIAMCINOLONE ACETONIDE 40 MG: 40 INJECTION, SUSPENSION INTRA-ARTICULAR; INTRAMUSCULAR at 10:57

## 2023-01-17 RX ADMIN — BUPIVACAINE HYDROCHLORIDE 75 MG: 2.5 INJECTION, SOLUTION INFILTRATION; PERINEURAL at 10:56

## 2023-01-17 NOTE — PROGRESS NOTES
Dr Dariela Maloney      Date /Time 1/17/2023       10:38 AM EST  Name Taina Pugh [facility]             1955   Location  [facility]  MRN [id number]                Chief Complaint   Patient presents with    Knee Pain     Bilateral Knee        History of Present Illness  Yuki Kaiser is a 74 y.o. male who presents with  bilateral knee pain, . Sent in consultation by Carol Gonzales MD, . Injury Mechanism:  none. Worker's Comp. & legal issues:   none. Previous Treatments: Ice, Heat, and NSAIDs    Patient presents the office today for a new problem. Patient is here with a chief complaint of bilateral knee pain. His left knee is worse than his right. He has had pain for many years. He previously did see Dr. Shanice Ryder. He has had injections before. No recent injury or trauma. 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 His pain is more global in nature. He has recently been placed on Celebrex but has not seen improvement.     Past History  Past Medical History:   Diagnosis Date    Adenomatous colon polyp 04/17/09    re do 2014    Agitation     Erectile dysfunction     Family history of early CAD 04/2010    Coronary Ca+ Score = \" 10\"    Hx of blood clots     calf post op     Hyperlipidemia     Kidney stone 03/2005    Lesion of ulnar nerve 4/2/2015    Osteoarthritis of both knees 1/13/2020    PONV (postoperative nausea and vomiting)     Shingles      Past Surgical History:   Procedure Laterality Date    ACHILLES TENDON SURGERY      rupture/repair    COLONOSCOPY  04/07/09    Tubular Adenoma    COLONOSCOPY  1/12/15    ELBOW SURGERY Right     EXCISION OF AURAL MASS      KIDNEY STONE SURGERY      KNEE ARTHROSCOPY      rt    PAIN MANAGEMENT PROCEDURE Right 9/15/2020    RIGHT LUMBAR THREE, FOUR, FIVE EPIDURAL STEROID INJECTION SITE CONFIRMED BY FLUOROSCOPY performed by Carrie Chen MD at [facility] Social History     Tobacco Use    Smoking status: Never    Smokeless tobacco: Current   Substance Use Topics    Alcohol use: Yes     Alcohol/week: 6.0 standard drinks     Types: 6 Cans of beer per week     Comment: 10 - 15  BEERS A WK x 15 yrs then decreased 10 a month      Current Outpatient Medications on File Prior to Visit   Medication Sig Dispense Refill    celecoxib (CELEBREX) 200 MG capsule Take 1 capsule by mouth daily For Arthritis 30 capsule 5    acetaminophen-codeine (TYLENOL #3) 300-30 MG per tablet       atorvastatin (LIPITOR) 80 MG tablet Take 1 tablet by mouth daily For high cholesterol 90 tablet 1    ketoconazole (NIZORAL) 2 % cream Apply topically daily. 30 g 0    methocarbamol (ROBAXIN) 500 MG tablet Take 1 tablet by mouth 4 times daily as needed (muscle spasms) 20 tablet 0    diclofenac (VOLTAREN) 75 MG EC tablet Take 1 tablet by mouth 2 times daily (with meals) 60 tablet 1    clobetasol (OLUX) 0.05 % foam       econazole nitrate 1 % cream       multivitamin (THERAGRAN) per tablet Take 1 tablet by mouth daily. No current facility-administered medications on file prior to visit. ASCVD 10-YEAR RISK SCORE  The 10-year ASCVD risk score (Kathy DK, et al., 2019) is: 8.2%    Values used to calculate the score:      Age: 74 years      Sex: Male      Is Non- : No      Diabetic: No      Tobacco smoker: No      Systolic Blood Pressure: 314 mmHg      Is BP treated: No      HDL Cholesterol: 50 mg/dL      Total Cholesterol: 135 mg/dL     Review of Systems  10-point ROS is negative other than HPI. Physical Exam  Based off 1997 Exam Criteria  Ht 5' 9\" (1.753 m)   Wt 218 lb (98.9 kg)   BMI 32.19 kg/m²      Constitutional:       General: He is not in acute distress. Appearance: Normal appearance. Cardiovascular:      Rate and Rhythm: Normal rate and regular rhythm. Pulses: Normal pulses.    Pulmonary:      Effort: Pulmonary effort is normal. No respiratory distress. Neurological:      Mental Status: He is alert and oriented to person, place, and time. Mental status is at baseline. Skin: Mean, dry, intact. No open sores  Lymphatics: No palpable lymph nodes    Musculoskeletal:  Gait:  altered  Lumbar spine: There is no swelling, warmth, or erythema. Range of motion is within normal limits. There is no paraspinal or spinous process tenderness. . The distal neurovascular exam is grossly intact and symmetric. Kyler Hip: Examination of the right and left hip reveals intact skin. The patient demonstrates full painless range of motion with regards to flexion, abduction, internal and external rotation. There is no tenderness about the greater trochanter. R Knee: Physical exam of the knee demonstrates painful range of motion 5-110. Tenderness over the medial joint line. No gross instability to either varus or valgus stress test.  Patient does have a positive patellofemoral grind test  L Knee: Physical exam of the knee demonstrates painful range of motion 5-110. Tenderness over the medial joint line. No gross instability to either varus or valgus stress test.  Patient does have a positive patellofemoral grind test with patellofemoral crepitations      Imaging  Bilateral Knee: [address] [facility],[address]  Radiographs: X-rays were ordered today and reviewed of the right and left knee. 4 views. Standing AP, standing AP flexed, lateral, and skyline views. They demonstrate moderate thinning of the medial joint surface bilaterally.   No evidence of fractures or dislocations      Procedure:  Orders Placed This Encounter   Procedures    XR KNEE RIGHT (MIN 4 VIEWS)     Standing Status:   Future     Number of Occurrences:   1     Standing Expiration Date:   1/15/2024    XR KNEE LEFT (MIN 4 VIEWS)     Standing Status:   Future     Number of Occurrences:   1     Standing Expiration Date:   1/15/2024    US ARTHR/ASP/INJ MAJOR JNT/BURSA RIGHT     Standing Status:   Future     Number of Occurrences:   1     Standing Expiration Date:   1/17/2024 20610 - VT DRAIN/INJECT LARGE JOINT/BURSA       I discussed in detail the risks, benefits and complications of aninjection which included but are not limited to infection, skin reactions, hot swollen joint, and anaphylaxis with the patient. The patient verbalized understanding and gave informed consent for the Right and left knee injection. The patient is placed supine on table with a bolster underneath knee.  Knee prepped with Betadine/Sterile alcohol solution. A sterile 22-gauge needle was inserted into the knee and the mixture of 2ml knealog 40mg/cc, 4 mL of 1% plain lidocaine, and 4 cc .25% bupivacaine was injected under sterile technique. The needle was withdrawn and the puncture site sealed with a Band-Aid.     Technique: Under sterile conditions a SonCheckr ultrasound unit with a variable frequency (6.0-15.0 MHz) linear transducer was used to localize the placement of a 22-gauge needle into the knee joint.    Findings: Successful needle placement for knee injection.  Final images were taken and saved for permanent record.       The patient tolerated the injection well. The patient was instructed to call the office immediately if there is any pain, redness, warmth, fever, or chills.  Assessment and Plan  Vinnie was seen today for knee pain.    Diagnoses and all orders for this visit:    Chronic pain of both knees  -     XR KNEE RIGHT (MIN 4 VIEWS); Future  -     XR KNEE LEFT (MIN 4 VIEWS); Future    Primary osteoarthritis of both knees  -     US ARTHR/ASP/INJ MAJOR JNT/BURSA RIGHT; Future  -     20610 - VT DRAIN/INJECT LARGE JOINT/BURSA    Other orders  -     bupivacaine (MARCAINE) 0.25 % injection 75 mg  -     lidocaine 1 % injection 20 mL  -     triamcinolone acetonide (KENALOG-40) injection 40 mg      Patient is suffering with bilateral knee osteoarthritis.  I do believe he would benefit from intra-articular cortisone injections with ultrasound  guidance which will be performed today bilaterally. He will stay on his Celebrex. We have discussed total knee arthroplasty and viscosupplementation for future treatment. Ultimately decision will depend on patient and how he responds to the cortisone. Follow-up in office in 6 weeks or sooner if problems arise. I discussed with Prerna Sumner that his history, symptoms, signs, and imaging are most consistent with knee arthritis. We reviewed the natural history of these conditions and treatment options ranging from conservative measures (rest, icing, activity modification, physical therapy, pain meds, cortisone injection) to surgical options. In terms of treatment, I recommended continuing with rest, icing, avoidance of painful activities, NSAIDs or pain meds as tolerated, and physical therapy. We discussed surgical options as well, should conservative measures fail. Total time spent reviewing past notes, imaging, labs, clinical exam, and treatment plan was  35 min. .     Electronically signed by Mamadou Russo PA-C on 1/17/2023 at 10:38 AM  This dictation was generated by voice recognition computer software. Although all attempts are made to edit the dictation for accuracy, there may be errors in the transcription that are not intended.

## 2023-02-27 ENCOUNTER — OFFICE VISIT (OUTPATIENT)
Dept: ORTHOPEDIC SURGERY | Age: 68
End: 2023-02-27
Payer: MEDICARE

## 2023-02-27 VITALS — HEIGHT: 69 IN | WEIGHT: 218 LBS | BODY MASS INDEX: 32.29 KG/M2

## 2023-02-27 DIAGNOSIS — M17.0 PRIMARY OSTEOARTHRITIS OF BOTH KNEES: Primary | ICD-10-CM

## 2023-02-27 PROCEDURE — 99213 OFFICE O/P EST LOW 20 MIN: CPT | Performed by: PHYSICIAN ASSISTANT

## 2023-02-27 PROCEDURE — 1123F ACP DISCUSS/DSCN MKR DOCD: CPT | Performed by: PHYSICIAN ASSISTANT

## 2023-02-27 NOTE — PROGRESS NOTES
Dr Annette Gallegos      Date /Time 2/27/2023       10:38 AM EST  Name Mitul Lozano [facility]             1955   Location  [facility]  MRN [id number]                Chief Complaint   Patient presents with    Follow-up     Bilateral Knee Cortisone 01/17/23        History of Present Illness  Ravi Cai is a 79 y.o. male who presents with  bilateral knee pain, . Injury Mechanism:  none. Worker's Comp. & legal issues:   none. Previous Treatments: Ice, Heat, and NSAIDs    Patient presents to the office today for a follow-up visit. Patient being treated for bilateral knee osteoarthritis. On 1/17/2023 patient did receive bilateral knee intra-articular cortisone injections. The injections did work well. He was able to take less NSAIDs and improved his function. His pain has started to return. He has tried diclofenac and Celebrex. No new injury or trauma    Previous history: Patient presents the office today for a new problem. Patient is here with a chief complaint of bilateral knee pain. His left knee is worse than his right. He has had pain for many years. He previously did see Dr. Gloria Vieira. He has had injections before. No recent injury or trauma. 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 His pain is more global in nature. He has recently been placed on Celebrex but has not seen improvement.     Past History  Past Medical History:   Diagnosis Date    Adenomatous colon polyp 04/17/09    re do 2014    Agitation     Erectile dysfunction     Family history of early CAD 04/2010    Coronary Ca+ Score = \" 10\"    Hx of blood clots     calf post op     Hyperlipidemia     Kidney stone 03/2005    Lesion of ulnar nerve 4/2/2015    Osteoarthritis of both knees 1/13/2020    PONV (postoperative nausea and vomiting)     Shingles      Past Surgical History:   Procedure Laterality Date    ACHILLES TENDON SURGERY      rupture/repair    COLONOSCOPY  04/07/09 Tubular Adenoma    COLONOSCOPY  1/12/15    ELBOW SURGERY Right     EXCISION OF AURAL MASS      KIDNEY STONE SURGERY      KNEE ARTHROSCOPY      rt    PAIN MANAGEMENT PROCEDURE Right 9/15/2020    RIGHT LUMBAR THREE, FOUR, FIVE EPIDURAL STEROID INJECTION SITE CONFIRMED BY FLUOROSCOPY performed by Chelsey Ellington MD at [facility]     Social History     Tobacco Use    Smoking status: Never    Smokeless tobacco: Current   Substance Use Topics    Alcohol use: Yes     Alcohol/week: 6.0 standard drinks     Types: 6 Cans of beer per week     Comment: 10 - 15  BEERS A WK x 15 yrs then decreased 10 a month      Current Outpatient Medications on File Prior to Visit   Medication Sig Dispense Refill    celecoxib (CELEBREX) 200 MG capsule Take 1 capsule by mouth daily For Arthritis 30 capsule 5    acetaminophen-codeine (TYLENOL #3) 300-30 MG per tablet       atorvastatin (LIPITOR) 80 MG tablet Take 1 tablet by mouth daily For high cholesterol 90 tablet 1    ketoconazole (NIZORAL) 2 % cream Apply topically daily. 30 g 0    methocarbamol (ROBAXIN) 500 MG tablet Take 1 tablet by mouth 4 times daily as needed (muscle spasms) 20 tablet 0    diclofenac (VOLTAREN) 75 MG EC tablet Take 1 tablet by mouth 2 times daily (with meals) 60 tablet 1    clobetasol (OLUX) 0.05 % foam       econazole nitrate 1 % cream       multivitamin (THERAGRAN) per tablet Take 1 tablet by mouth daily. No current facility-administered medications on file prior to visit.       ASCVD 10-YEAR RISK SCORE  The 10-year ASCVD risk score (Kathy MARIE, et al., 2019) is: 9%    Values used to calculate the score:      Age: 79 years      Sex: Male      Is Non- : No      Diabetic: No      Tobacco smoker: No      Systolic Blood Pressure: 840 mmHg      Is BP treated: No      HDL Cholesterol: 50 mg/dL      Total Cholesterol: 135 mg/dL     Review of Systems  10-point ROS is negative other than HPI.    Physical Exam  Based off 1997 Exam Criteria  Ht 5' 9\" (1.753 m)   Wt 218 lb (98.9 kg)   BMI 32.19 kg/m²      Constitutional:       General: He is not in acute distress. Appearance: Normal appearance. Cardiovascular:      Rate and Rhythm: Normal rate and regular rhythm. Pulses: Normal pulses. Pulmonary:      Effort: Pulmonary effort is normal. No respiratory distress. Neurological:      Mental Status: He is alert and oriented to person, place, and time. Mental status is at baseline. Skin: Mean, dry, intact. No open sores  Lymphatics: No palpable lymph nodes    Musculoskeletal:  Gait:  altered  Lumbar spine: There is no swelling, warmth, or erythema. Range of motion is within normal limits. There is no paraspinal or spinous process tenderness. . The distal neurovascular exam is grossly intact and symmetric. Kyler Hip: Examination of the right and left hip reveals intact skin. The patient demonstrates full painless range of motion with regards to flexion, abduction, internal and external rotation. There is no tenderness about the greater trochanter. R Knee: Physical exam of the knee demonstrates painful range of motion 5-110. Tenderness over the medial joint line. No gross instability to either varus or valgus stress test.  Patient does have a positive patellofemoral grind test  L Knee: Physical exam of the knee demonstrates painful range of motion 5-110. Tenderness over the medial joint line. No gross instability to either varus or valgus stress test.  Patient does have a positive patellofemoral grind test with patellofemoral crepitations      Imaging  Bilateral Knee: [address] [facility]  Previous Radiographs: X-rays were ordered today and reviewed of the right and left knee. 4 views. Standing AP, standing AP flexed, lateral, and skyline views. They demonstrate moderate thinning of the medial joint surface bilaterally.   No evidence of fractures or dislocations      Procedure:  Orders Placed This Encounter   Procedures    GEL-SYN INJECTION (For Auth/Precert)     One Injection once a week for 3 weeks to Bilateral Knees     Standing Status:   Future     Standing Expiration Date:   2/27/2024         Assessment and Plan  Vinnie was seen today for follow-up.    Diagnoses and all orders for this visit:    Primary osteoarthritis of both knees    Other orders  -     GEL-SYN INJECTION (For Auth/Precert); Future          Patient is suffering with bilateral knee osteoarthritis.  We we will request bilateral knee viscosupplementation injections.  Patient will follow-up in office once injections approved to begin the series    I discussed with Vinnie Driver that his history, symptoms, signs, and imaging are most consistent with knee arthritis.    We reviewed the natural history of these conditions and treatment options ranging from conservative measures (rest, icing, activity modification, physical therapy, pain meds, cortisone injection) to surgical options.     In terms of treatment, I recommended continuing with rest, icing, avoidance of painful activities, NSAIDs or pain meds as tolerated, and physical therapy.     We discussed surgical options as well, should conservative measures fail.     Total time spent reviewing past notes, imaging, labs, clinical exam, and treatment plan was  35 min..     Electronically signed by Hansel Ventura PA-C on 2/27/2023 at 10:41 AM  This dictation was generated by voice recognition computer software.  Although all attempts are made to edit the dictation for accuracy, there may be errors in the transcription that are not intended.

## 2023-03-01 ENCOUNTER — HOSPITAL ENCOUNTER (EMERGENCY)
Age: 68
Discharge: HOME | End: 2023-03-01
Attending: EMERGENCY MEDICINE
Payer: MEDICARE

## 2023-03-01 ENCOUNTER — APPOINTMENT (OUTPATIENT)
Dept: VASCULAR LAB | Age: 68
End: 2023-03-01
Payer: MEDICARE

## 2023-03-01 VITALS
HEART RATE: 81 BPM | DIASTOLIC BLOOD PRESSURE: 76 MMHG | RESPIRATION RATE: 18 BRPM | WEIGHT: 213 LBS | BODY MASS INDEX: 31.55 KG/M2 | OXYGEN SATURATION: 94 % | TEMPERATURE: 97.8 F | HEIGHT: 69 IN | SYSTOLIC BLOOD PRESSURE: 121 MMHG

## 2023-03-01 DIAGNOSIS — M79.605 BILATERAL LEG PAIN: Primary | ICD-10-CM

## 2023-03-01 DIAGNOSIS — M79.604 BILATERAL LEG PAIN: Primary | ICD-10-CM

## 2023-03-01 PROCEDURE — 99284 EMERGENCY DEPT VISIT MOD MDM: CPT

## 2023-03-01 PROCEDURE — 93970 EXTREMITY STUDY: CPT

## 2023-03-01 ASSESSMENT — PAIN SCALES - GENERAL: PAINLEVEL_OUTOF10: 0

## 2023-03-01 ASSESSMENT — PAIN - FUNCTIONAL ASSESSMENT: PAIN_FUNCTIONAL_ASSESSMENT: NONE - DENIES PAIN

## 2023-03-01 NOTE — DISCHARGE INSTRUCTIONS
Try magnesium cream for your neuropathy. Your ultrasounds were negative today for DVT. Follow up with primary care regarding your leg pain.

## 2023-03-03 NOTE — ED PROVIDER NOTES
[facility]  ED      CHIEF COMPLAINT  Leg Pain (Pt reports bilat calf pain that woke him out of his sleep this morning. Pt reports pain comes and goes. Pt does report hx of DVT with similar pains. Denies long travel. Pt does report strenuous activity yesterday. )       HISTORY OF PRESENT ILLNESS  Portillo Morrow is a 79 y.o. male  who presents to the ED complaining of calf pain. The patient states that he was sleeping last night, and pain in his calves woke him up from sleep. He states that it felt more like a muscle cramp at first then the pain progressed and felt very similar to when he had a blood clot in the past.  He states the pain has improved since last night, but still is somewhat persistent and he was concerned about having a DVT. He does state that the pain could be related to the fact that he was on the elliptical yesterday. He denies any recent risk factors for VTE such as recent hospitalizations, recent surgeries, recent travel, leg edema, estrogen supplementation, or malignancy. His prior DVT was after a knee surgery. He states he has chronic neuropathy and this is unchanged today. He has baseline numbness in his feet. He denies any leg weakness. He also denies any chest pain, shortness of breath, fevers or leg swelling or redness. No other complaints, modifying factors or associated symptoms. I have reviewed the following from the nursing documentation.     Past Medical History:   Diagnosis Date    Adenomatous colon polyp 04/17/09    re do 2014    Agitation     Erectile dysfunction     Family history of early CAD 04/2010    Coronary Ca+ Score = \" 10\"    Hx of blood clots     calf post op     Hyperlipidemia     Kidney stone 03/2005    Lesion of ulnar nerve 4/2/2015    Osteoarthritis of both knees 1/13/2020    PONV (postoperative nausea and vomiting)     Shingles      Past Surgical History:   Procedure Laterality Date    ACHILLES TENDON SURGERY      rupture/repair COLONOSCOPY  04/07/09    Tubular Adenoma    COLONOSCOPY  1/12/15    ELBOW SURGERY Right     EXCISION OF AURAL MASS      KIDNEY STONE SURGERY      KNEE ARTHROSCOPY      rt    PAIN MANAGEMENT PROCEDURE Right 9/15/2020    RIGHT LUMBAR THREE, FOUR, FIVE EPIDURAL STEROID INJECTION SITE CONFIRMED BY FLUOROSCOPY performed by Jarrett Espinoza MD at [facility]     Family History   Problem Relation Age of Onset    Cancer Father         Stomach CA    Heart Disease Father 54        MI x 2     Social History     Socioeconomic History    Marital status:      Spouse name: Not on file    Number of children: Not on file    Years of education: Not on file    Highest education level: Not on file   Occupational History    Not on file   Tobacco Use    Smoking status: Never    Smokeless tobacco: Current   Vaping Use    Vaping Use: Never used   Substance and Sexual Activity    Alcohol use: Yes     Alcohol/week: 6.0 standard drinks     Types: 6 Cans of beer per week     Comment: 10 - 15  BEERS A WK x 15 yrs then decreased 10 a month    Drug use: No    Sexual activity: Not on file   Other Topics Concern    Not on file   Social History Narrative    Not on file     Social Determinants of Health     Financial Resource Strain: Low Risk     Difficulty of Paying Living Expenses: Not hard at all   Food Insecurity: No Food Insecurity    Worried About Running Out of Food in the Last Year: Never true    Ran Out of Food in the Last Year: Never true   Transportation Needs: Not on file   Physical Activity: Sufficiently Active    Days of Exercise per Week: 6 days    Minutes of Exercise per Session: 90 min   Stress: Not on file   Social Connections: Not on file   Intimate Partner Violence: Not on file   Housing Stability: Not on file     No current facility-administered medications for this encounter.      Current Outpatient Medications   Medication Sig Dispense Refill    celecoxib (CELEBREX) 200 MG capsule Take 1 capsule by mouth daily For Arthritis 30 capsule 5    acetaminophen-codeine (TYLENOL #3) 300-30 MG per tablet       atorvastatin (LIPITOR) 80 MG tablet Take 1 tablet by mouth daily For high cholesterol 90 tablet 1    ketoconazole (NIZORAL) 2 % cream Apply topically daily. 30 g 0    methocarbamol (ROBAXIN) 500 MG tablet Take 1 tablet by mouth 4 times daily as needed (muscle spasms) 20 tablet 0    diclofenac (VOLTAREN) 75 MG EC tablet Take 1 tablet by mouth 2 times daily (with meals) 60 tablet 1    clobetasol (OLUX) 0.05 % foam       econazole nitrate 1 % cream       multivitamin (THERAGRAN) per tablet Take 1 tablet by mouth daily. Allergies   Allergen Reactions    Penicillins Nausea And Vomiting       REVIEW OF SYSTEMS  10 systems reviewed, pertinent positives per HPI otherwise noted to be negative. PHYSICAL EXAM  /76   Pulse 81   Temp 97.8 °F (36.6 °C) (Oral)   Resp 18   Ht 5' 9\" (1.753 m)   Wt 213 lb (96.6 kg)   SpO2 94%   BMI 31.45 kg/m²    Physical exam:  General appearance: awake and cooperative. No distress. Non toxic appearing. Skin: Warm and dry. No acute rashes. HENT: Normocephalic. Atraumatic. Mucus membranes are moist. No drooling or stridor. Neck: supple  Eyes: SHAHEEN. EOM intact. Heart: RRR. No murmurs. Lungs: Respirations unlabored. CTAB. No wheezes, rales, or rhonchi. Good air exchange  Abdomen: No tenderness. Bowel sounds normoactive. Soft. Non distended. No peritoneal signs. Musculoskeletal: reproducible tenderness to palpation bilateral calves and medial aspect of left thigh; no soft tissue swelling, erythema or edema; compartments are soft there is no pain with passive ROM of ankle. Sensation diminished on distal aspect of feet bilaterally (patient states this is baseline as he has neuropathy); Compartments soft. No deformity. No tenderness in the extremities. All extremities neurovascularly intact.  Radial, Dp, and PT pulses +2/4 bilaterally  Neurological: Alert and oriented. Strength 5/5 with ankle and toe plantarflexion/dorsiflexion. No focal deficits. No aphasia or dysarthria. No gait ataxia. Psychiatric: Normal mood and affect. LABS  I have reviewed all labs for this visit. No results found for this visit on 03/01/23. RADIOLOGY  VL Extremity Venous Bilateral    Result Date: 3/2/2023  Lower Extremities DVT Study  Demographics   Patient Name       Amee Dumont   Date of Study      03/01/2023         Gender              Male   Patient Number     [id number]         Date of Birth       1955   Visit Number       [id number]          Age                 79 year(s)   Accession Number   [id number]         Room Number         25   Corporate ID       [id number]           Sonographer         Lalo Davila T   Ordering Physician Apolinar Braden,   Interpreting        Anamaria Summers D.O. Physician           Vascular                                                            Millicent Portillo MD  Procedure Type of Study:   Veins:Lower Extremities DVT Study, VASC EXTREMITY VENOUS DUPLEX BILATERAL. Vascular Sonographer Report  Additional Indications:Patient complains of bilateral lower extremity pain for approximately 3 days. Venous Duplex Scan: B-mode imaging of the deep and superficial veins, with compression maneuvers, including color and Doppler spectral waveform analysis. Impressions Right Impression No evidence of deep vein or superficial vein thrombosis involving the right lower extremity. Left Impression No evidence of deep vein or superficial vein thrombosis involving the left lower extremity. Conclusions   Summary   Normal venous duplex study of the bilateral lower extremities. There is no  evidence of deep or superficial venous thrombosis.    Signature   ------------------------------------------------------------------  Electronically signed by Millicent Portillo MD (Interpreting physician) on 03/02/2023 at 01:10 PM  ------------------------------------------------------------------  Patient Status:ER. Study [facility] [address] - Vascular Lab. Technical Quality:Adequate visualization.   - Results were reported to:RADHA Tavarez at 12:10 on 03/01/2023. Velocities are measured in cm/s ; Diameters are measured in mm Right Lower Extremities DVT Study Measurements Right 2D Measurements +------------------------+----------+---------------+----------+ ! Location                ! Visualized! Compressibility! Thrombosis! +------------------------+----------+---------------+----------+ ! Sapheno Femoral Junction! Yes       ! Yes            ! None      ! +------------------------+----------+---------------+----------+ ! GSV Thigh               ! Yes       ! Yes            ! None      ! +------------------------+----------+---------------+----------+ ! Common Femoral          !Yes       ! Yes            ! None      ! +------------------------+----------+---------------+----------+ ! Prox Femoral            !Yes       ! Yes            ! None      ! +------------------------+----------+---------------+----------+ ! Mid Femoral             !Yes       ! Yes            ! None      ! +------------------------+----------+---------------+----------+ ! Dist Femoral            !Yes       ! Yes            ! None      ! +------------------------+----------+---------------+----------+ ! Deep Femoral            !Yes       ! Yes            ! None      ! +------------------------+----------+---------------+----------+ ! Popliteal               !Yes       ! Yes            ! None      ! +------------------------+----------+---------------+----------+ ! GSV Below Knee          ! Yes       ! Yes            ! None      ! +------------------------+----------+---------------+----------+ ! Gastroc                 ! Yes       ! Yes            ! None      ! +------------------------+----------+---------------+----------+ ! Soleal                  !Yes !Yes            !None      ! +------------------------+----------+---------------+----------+ !PTV                     !Yes       !Yes            !None      ! +------------------------+----------+---------------+----------+ !Peroneal                !Yes       !Yes            !None      ! +------------------------+----------+---------------+----------+ !GSV Calf                !Yes       !Yes            !None      ! +------------------------+----------+---------------+----------+ !SSV                     !Yes       !Yes            !None      ! +------------------------+----------+---------------+----------+ Right Doppler Measurements +------------------------+------+------+------------+ !Location                !Signal!Reflux!Reflux (sec)! +------------------------+------+------+------------+ !Sapheno Femoral Junction!Phasic!No    !            ! +------------------------+------+------+------------+ !Common Femoral          !Phasic!No    !            ! +------------------------+------+------+------------+ !Femoral                 !Phasic!No    !            ! +------------------------+------+------+------------+ !Deep Femoral            !Phasic!No    !            ! +------------------------+------+------+------------+ !Popliteal               !Phasic!No    !            ! +------------------------+------+------+------------+ Left Lower Extremities DVT Study Measurements Left 2D Measurements +------------------------+----------+---------------+----------+ !Location                !Visualized!Compressibility!Thrombosis! +------------------------+----------+---------------+----------+ !Sapheno Femoral Junction!Yes       !Yes            !None      ! +------------------------+----------+---------------+----------+ !GSV Thigh               !Yes       !Yes            !None      ! +------------------------+----------+---------------+----------+ !Common Femoral          !Yes       !Yes            !None      !  +------------------------+----------+---------------+----------+ ! Prox Femoral            !Yes       ! Yes            ! None      ! +------------------------+----------+---------------+----------+ ! Mid Femoral             !Yes       ! Yes            ! None      ! +------------------------+----------+---------------+----------+ ! Dist Femoral            !Yes       ! Yes            ! None      ! +------------------------+----------+---------------+----------+ ! Deep Femoral            !Yes       ! Yes            ! None      ! +------------------------+----------+---------------+----------+ ! Popliteal               !Yes       ! Yes            ! None      ! +------------------------+----------+---------------+----------+ ! GSV Below Knee          ! Yes       ! Yes            ! None      ! +------------------------+----------+---------------+----------+ ! Gastroc                 ! Yes       ! Yes            ! None      ! +------------------------+----------+---------------+----------+ ! Soleal                  !Yes       ! Yes            ! None      ! +------------------------+----------+---------------+----------+ ! PTV                     ! Yes       ! Yes            ! None      ! +------------------------+----------+---------------+----------+ ! Peroneal                !Yes       ! Yes            ! None      ! +------------------------+----------+---------------+----------+ ! GSV Calf                ! Yes       ! Yes            ! None      ! +------------------------+----------+---------------+----------+ ! SSV                     ! Yes       ! Yes            ! None      ! +------------------------+----------+---------------+----------+ Left Doppler Measurements +------------------------+------+------+------------+ ! Location                ! Signal!Reflux! Reflux (sec)! +------------------------+------+------+------------+ ! Sapheno Femoral Junction! Phasic! No    !            ! +------------------------+------+------+------------+ ! Common Femoral !Phasic! No    !            ! +------------------------+------+------+------------+ ! Femoral                 !Phasic! No    !            ! +------------------------+------+------+------------+ ! Deep Femoral            !Phasic! No    !            ! +------------------------+------+------+------------+ ! Popliteal               !Phasic! No    !            ! +------------------------+------+------+------------+     No results found. Is this patient to be included in the SEP-1 Core Measure due to severe sepsis or septic shock? No   Exclusion criteria - the patient is NOT to be included for SEP-1 Core Measure due to: Infection is not suspected        ED COURSE/MDM  Patient seen and evaluated. Old records reviewed. Labs and imaging reviewed and results discussed with patient. The patient is a 51-year-old male presenting for evaluation of leg pain. Labs within normal limits. The patient is well-appearing on exam appears to be in no distress. He describes the pain in his legs as being similar to a prior blood clot. He did have some tenderness in his calves and in his left medial thigh region, however overall there is no appreciable soft tissue swelling of the legs. He is neurovascularly intact. He does not appear to have cellulitis or other infectious process occurring. I do not suspect compartment syndrome or rhabdomyolysis. Venous Dopplers of the lower extremities were performed and negative for DVT. The patient is reassured. He likely is experiencing a muscle strain. He is to follow-up with primary care. We discussed tricked return precautions back to the ED and he voices understanding. During the patient's ED course, the patient was given:  Medications - No data to display     CLINICAL IMPRESSION  1. Bilateral leg pain        Blood pressure 121/76, pulse 81, temperature 97.8 °F (36.6 °C), temperature source Oral, resp. rate 18, height 5' 9\" (1.753 m), weight 213 lb (96.6 kg), SpO2 94 %.       Patient was given scripts for the following medications. I counseled patient how to take these medications. Discharge Medication List as of 3/1/2023  1:11 PM          Follow-up with:  Federica Cordero, NIC - CNP  [address] [id number] in 1 day        DISCLAIMER: This chart was created using Dragon dictation software. Efforts were made by me to ensure accuracy, however some errors may be present due to limitations of this technology and occasionally words are not transcribed correctly.        [place], [address] [place] [address]  03/03/23 [id number]

## 2023-03-15 ENCOUNTER — TELEPHONE (OUTPATIENT)
Dept: ORTHOPEDIC SURGERY | Age: 68
End: 2023-03-15

## 2023-03-23 ENCOUNTER — OFFICE VISIT (OUTPATIENT)
Dept: INTERNAL MEDICINE CLINIC | Age: 68
End: 2023-03-23

## 2023-03-23 VITALS
BODY MASS INDEX: 30.38 KG/M2 | WEIGHT: 217 LBS | DIASTOLIC BLOOD PRESSURE: 78 MMHG | HEIGHT: 71 IN | RESPIRATION RATE: 12 BRPM | HEART RATE: 80 BPM | OXYGEN SATURATION: 97 % | SYSTOLIC BLOOD PRESSURE: 122 MMHG | TEMPERATURE: 97.8 F

## 2023-03-23 DIAGNOSIS — M17.0 PRIMARY OSTEOARTHRITIS OF BOTH KNEES: ICD-10-CM

## 2023-03-23 DIAGNOSIS — Z12.11 SCREENING FOR COLON CANCER: ICD-10-CM

## 2023-03-23 DIAGNOSIS — E78.5 DYSLIPIDEMIA: ICD-10-CM

## 2023-03-23 DIAGNOSIS — Z00.00 WELL ADULT EXAM: ICD-10-CM

## 2023-03-23 DIAGNOSIS — G62.9 PERIPHERAL POLYNEUROPATHY: Primary | ICD-10-CM

## 2023-03-23 SDOH — ECONOMIC STABILITY: FOOD INSECURITY: WITHIN THE PAST 12 MONTHS, YOU WORRIED THAT YOUR FOOD WOULD RUN OUT BEFORE YOU GOT MONEY TO BUY MORE.: NEVER TRUE

## 2023-03-23 SDOH — ECONOMIC STABILITY: FOOD INSECURITY: WITHIN THE PAST 12 MONTHS, THE FOOD YOU BOUGHT JUST DIDN'T LAST AND YOU DIDN'T HAVE MONEY TO GET MORE.: NEVER TRUE

## 2023-03-23 SDOH — ECONOMIC STABILITY: HOUSING INSECURITY
IN THE LAST 12 MONTHS, WAS THERE A TIME WHEN YOU DID NOT HAVE A STEADY PLACE TO SLEEP OR SLEPT IN A SHELTER (INCLUDING NOW)?: NO

## 2023-03-23 SDOH — ECONOMIC STABILITY: INCOME INSECURITY: HOW HARD IS IT FOR YOU TO PAY FOR THE VERY BASICS LIKE FOOD, HOUSING, MEDICAL CARE, AND HEATING?: NOT HARD AT ALL

## 2023-03-23 ASSESSMENT — PATIENT HEALTH QUESTIONNAIRE - PHQ9
5. POOR APPETITE OR OVEREATING: 0
3. TROUBLE FALLING OR STAYING ASLEEP: 0
SUM OF ALL RESPONSES TO PHQ QUESTIONS 1-9: 0
7. TROUBLE CONCENTRATING ON THINGS, SUCH AS READING THE NEWSPAPER OR WATCHING TELEVISION: 0
SUM OF ALL RESPONSES TO PHQ9 QUESTIONS 1 & 2: 0
2. FEELING DOWN, DEPRESSED OR HOPELESS: 0
SUM OF ALL RESPONSES TO PHQ QUESTIONS 1-9: 0
10. IF YOU CHECKED OFF ANY PROBLEMS, HOW DIFFICULT HAVE THESE PROBLEMS MADE IT FOR YOU TO DO YOUR WORK, TAKE CARE OF THINGS AT HOME, OR GET ALONG WITH OTHER PEOPLE: 0
9. THOUGHTS THAT YOU WOULD BE BETTER OFF DEAD, OR OF HURTING YOURSELF: 0
SUM OF ALL RESPONSES TO PHQ QUESTIONS 1-9: 0
8. MOVING OR SPEAKING SO SLOWLY THAT OTHER PEOPLE COULD HAVE NOTICED. OR THE OPPOSITE, BEING SO FIGETY OR RESTLESS THAT YOU HAVE BEEN MOVING AROUND A LOT MORE THAN USUAL: 0
SUM OF ALL RESPONSES TO PHQ QUESTIONS 1-9: 0
6. FEELING BAD ABOUT YOURSELF - OR THAT YOU ARE A FAILURE OR HAVE LET YOURSELF OR YOUR FAMILY DOWN: 0
1. LITTLE INTEREST OR PLEASURE IN DOING THINGS: 0
4. FEELING TIRED OR HAVING LITTLE ENERGY: 0

## 2023-03-23 ASSESSMENT — ANXIETY QUESTIONNAIRES
6. BECOMING EASILY ANNOYED OR IRRITABLE: 0
5. BEING SO RESTLESS THAT IT IS HARD TO SIT STILL: 0
1. FEELING NERVOUS, ANXIOUS, OR ON EDGE: 0
3. WORRYING TOO MUCH ABOUT DIFFERENT THINGS: 0
4. TROUBLE RELAXING: 0
2. NOT BEING ABLE TO STOP OR CONTROL WORRYING: 0
GAD7 TOTAL SCORE: 0
IF YOU CHECKED OFF ANY PROBLEMS ON THIS QUESTIONNAIRE, HOW DIFFICULT HAVE THESE PROBLEMS MADE IT FOR YOU TO DO YOUR WORK, TAKE CARE OF THINGS AT HOME, OR GET ALONG WITH OTHER PEOPLE: NOT DIFFICULT AT ALL
7. FEELING AFRAID AS IF SOMETHING AWFUL MIGHT HAPPEN: 0

## 2023-03-23 ASSESSMENT — ENCOUNTER SYMPTOMS
CONSTIPATION: 0
CHEST TIGHTNESS: 0
SORE THROAT: 0
COUGH: 0
VOMITING: 0
SINUS PAIN: 0
NAUSEA: 0
DIARRHEA: 0
SHORTNESS OF BREATH: 0

## 2023-03-23 NOTE — PROGRESS NOTES
Past Surgical History:   Procedure Laterality Date    ACHILLES TENDON SURGERY      rupture/repair    COLONOSCOPY  04/07/09    Tubular Adenoma    COLONOSCOPY  1/12/15    ELBOW SURGERY Right     EXCISION OF AURAL MASS      KIDNEY STONE SURGERY      KNEE ARTHROSCOPY      rt    PAIN MANAGEMENT PROCEDURE Right 9/15/2020    RIGHT LUMBAR THREE, FOUR, FIVE EPIDURAL STEROID INJECTION SITE CONFIRMED BY FLUOROSCOPY performed by Kathi James MD at [facility]       Family History   Problem Relation Age of Onset    Cancer Father         Stomach CA    Heart Disease Father 54        MI x 2       Social History     Tobacco Use    Smoking status: Never    Smokeless tobacco: Current   Substance Use Topics    Alcohol use: Yes     Alcohol/week: 6.0 standard drinks     Types: 6 Cans of beer per week     Comment: 10 - 15  BEERS A WK x 15 yrs then decreased 10 a month        Current Outpatient Medications   Medication Sig Dispense Refill    celecoxib (CELEBREX) 200 MG capsule Take 1 capsule by mouth daily For Arthritis 30 capsule 5    atorvastatin (LIPITOR) 80 MG tablet Take 1 tablet by mouth daily For high cholesterol 90 tablet 1    ketoconazole (NIZORAL) 2 % cream Apply topically daily. 30 g 0    multivitamin (THERAGRAN) per tablet Take 1 tablet by mouth daily. acetaminophen-codeine (TYLENOL #3) 300-30 MG per tablet       methocarbamol (ROBAXIN) 500 MG tablet Take 1 tablet by mouth 4 times daily as needed (muscle spasms) 20 tablet 0    diclofenac (VOLTAREN) 75 MG EC tablet Take 1 tablet by mouth 2 times daily (with meals) 60 tablet 1    clobetasol (OLUX) 0.05 % foam       econazole nitrate 1 % cream        No current facility-administered medications for this visit. Allergies   Allergen Reactions    Penicillins Nausea And Vomiting and Nausea Only       Subjective:      Review of Systems   Constitutional:  Negative for fever.    HENT:  Negative for sinus pain

## 2023-10-18 ENCOUNTER — OFFICE VISIT (OUTPATIENT)
Dept: INTERNAL MEDICINE CLINIC | Age: 68
End: 2023-10-18
Payer: MEDICARE

## 2023-10-18 VITALS
RESPIRATION RATE: 12 BRPM | TEMPERATURE: 97.5 F | OXYGEN SATURATION: 98 % | SYSTOLIC BLOOD PRESSURE: 126 MMHG | HEART RATE: 82 BPM | WEIGHT: 212 LBS | BODY MASS INDEX: 30.35 KG/M2 | DIASTOLIC BLOOD PRESSURE: 80 MMHG | HEIGHT: 70 IN

## 2023-10-18 DIAGNOSIS — L30.9 DERMATITIS: Primary | ICD-10-CM

## 2023-10-18 PROCEDURE — 1123F ACP DISCUSS/DSCN MKR DOCD: CPT | Performed by: NURSE PRACTITIONER

## 2023-10-18 PROCEDURE — 99213 OFFICE O/P EST LOW 20 MIN: CPT | Performed by: NURSE PRACTITIONER

## 2023-10-18 RX ORDER — PRAVASTATIN SODIUM 80 MG/1
TABLET ORAL
COMMUNITY
Start: 2017-01-26

## 2023-10-18 RX ORDER — GABAPENTIN 100 MG/1
CAPSULE ORAL
COMMUNITY

## 2023-10-18 RX ORDER — METHYLPREDNISOLONE 4 MG/1
TABLET ORAL
Qty: 1 KIT | Refills: 0 | Status: SHIPPED | OUTPATIENT
Start: 2023-10-18 | End: 2023-10-24

## 2023-10-18 RX ORDER — TERBINAFINE HYDROCHLORIDE 250 MG/1
TABLET ORAL
COMMUNITY
End: 2023-10-18

## 2023-10-18 SDOH — ECONOMIC STABILITY: FOOD INSECURITY: WITHIN THE PAST 12 MONTHS, YOU WORRIED THAT YOUR FOOD WOULD RUN OUT BEFORE YOU GOT MONEY TO BUY MORE.: NEVER TRUE

## 2023-10-18 SDOH — ECONOMIC STABILITY: FOOD INSECURITY: WITHIN THE PAST 12 MONTHS, THE FOOD YOU BOUGHT JUST DIDN'T LAST AND YOU DIDN'T HAVE MONEY TO GET MORE.: NEVER TRUE

## 2023-10-18 SDOH — ECONOMIC STABILITY: INCOME INSECURITY: HOW HARD IS IT FOR YOU TO PAY FOR THE VERY BASICS LIKE FOOD, HOUSING, MEDICAL CARE, AND HEATING?: NOT HARD AT ALL

## 2023-10-18 ASSESSMENT — ENCOUNTER SYMPTOMS
NAUSEA: 0
SHORTNESS OF BREATH: 0
SINUS PAIN: 0
COUGH: 0
CHEST TIGHTNESS: 0
VOMITING: 0
CONSTIPATION: 0
DIARRHEA: 0
SORE THROAT: 0

## 2023-10-18 NOTE — PROGRESS NOTES
[address] [facility] Internal Medicine  [facility], [facility]  Tel:[phone]    Uzair Escobedo is a 79 y.o. male who presents today for his medical conditions/complaints as noted below. Vincent Lemoore Neisha is c/o of Rash (Right Leg and bilateral buttocks)      Chief Complaint   Patient presents with    Rash     Right Leg and bilateral buttocks       HPI:     Rash: Uzair Escobedo is a 79 y.o. male who presents with a 5 day history of a localized rash. Rash first presented on the bilateral buttock and left upper leg and has not spread. Rash is red and is pruritic and raised. Associated symptoms include none. Patient has tried topical antifungal.  New exposures: none. Patient has not had contacts with similar symptoms. Prior history of similar symptoms: yes - years ago. Denies plant/irritant contact. No changes in soaps/lotions lately.        Past Medical History:   Diagnosis Date    Adenomatous colon polyp 04/17/09    re do 2014    Agitation     Erectile dysfunction     Family history of early CAD 04/2010    Coronary Ca+ Score = \" 10\"    Hx of blood clots     calf post op     Hyperlipidemia     Kidney stone 03/2005    Lateral epicondylitis of left elbow 11/13/2014    Lesion of ulnar nerve 4/2/2015    Osteoarthritis of both knees 1/13/2020    PONV (postoperative nausea and vomiting)     Shingles         Past Surgical History:   Procedure Laterality Date    ACHILLES TENDON SURGERY      rupture/repair    COLONOSCOPY  04/07/09    Tubular Adenoma    COLONOSCOPY  1/12/15    ELBOW SURGERY Right     EXCISION OF AURAL MASS      KIDNEY STONE SURGERY      KNEE ARTHROSCOPY      rt    PAIN MANAGEMENT PROCEDURE Right 9/15/2020    RIGHT LUMBAR THREE, FOUR, FIVE EPIDURAL STEROID INJECTION SITE CONFIRMED BY FLUOROSCOPY performed by Arlyn Us MD at [address] [place] [address]       Family History   Problem Relation Age of

## 2023-12-01 ENCOUNTER — APPOINTMENT (OUTPATIENT)
Dept: CT IMAGING | Age: 68
End: 2023-12-01
Payer: MEDICARE

## 2023-12-01 ENCOUNTER — HOSPITAL ENCOUNTER (EMERGENCY)
Age: 68
Discharge: HOME | End: 2023-12-01
Payer: MEDICARE

## 2023-12-01 VITALS
RESPIRATION RATE: 12 BRPM | SYSTOLIC BLOOD PRESSURE: 123 MMHG | TEMPERATURE: 98 F | HEART RATE: 58 BPM | DIASTOLIC BLOOD PRESSURE: 74 MMHG | OXYGEN SATURATION: 98 %

## 2023-12-01 DIAGNOSIS — N20.0 NEPHROLITHIASIS: ICD-10-CM

## 2023-12-01 DIAGNOSIS — R10.9 RIGHT FLANK PAIN: Primary | ICD-10-CM

## 2023-12-01 LAB
ALBUMIN SERPL-MCNC: 4.4 G/DL (ref 3.4–5)
ALBUMIN/GLOB SERPL: 1.5 {RATIO} (ref 1.1–2.2)
ALP SERPL-CCNC: 82 U/L (ref 40–129)
ALT SERPL-CCNC: 31 U/L (ref 10–40)
ANION GAP SERPL CALCULATED.3IONS-SCNC: 9 MMOL/L (ref 3–16)
AST SERPL-CCNC: 30 U/L (ref 15–37)
BASOPHILS # BLD: 0 K/UL (ref 0–0.2)
BASOPHILS NFR BLD: 0.7 %
BILIRUB SERPL-MCNC: 0.6 MG/DL (ref 0–1)
BILIRUB UR QL STRIP.AUTO: NEGATIVE
BUN SERPL-MCNC: 16 MG/DL (ref 7–20)
CALCIUM SERPL-MCNC: 9.5 MG/DL (ref 8.3–10.6)
CHLORIDE SERPL-SCNC: 102 MMOL/L (ref 99–110)
CLARITY UR: CLEAR
CO2 SERPL-SCNC: 27 MMOL/L (ref 21–32)
COLOR UR: YELLOW
CREAT SERPL-MCNC: 0.8 MG/DL (ref 0.8–1.3)
DEPRECATED RDW RBC AUTO: 14.3 % (ref 12.4–15.4)
EOSINOPHIL # BLD: 0.1 K/UL (ref 0–0.6)
EOSINOPHIL NFR BLD: 1.8 %
GFR SERPLBLD CREATININE-BSD FMLA CKD-EPI: >60 ML/MIN/{1.73_M2}
GLUCOSE SERPL-MCNC: 125 MG/DL (ref 70–99)
GLUCOSE UR STRIP.AUTO-MCNC: NEGATIVE MG/DL
HCT VFR BLD AUTO: 45.3 % (ref 40.5–52.5)
HGB BLD-MCNC: 15.1 G/DL (ref 13.5–17.5)
HGB UR QL STRIP.AUTO: NEGATIVE
KETONES UR STRIP.AUTO-MCNC: NEGATIVE MG/DL
LEUKOCYTE ESTERASE UR QL STRIP.AUTO: NEGATIVE
LYMPHOCYTES # BLD: 2.3 K/UL (ref 1–5.1)
LYMPHOCYTES NFR BLD: 37.7 %
MCH RBC QN AUTO: 29.7 PG (ref 26–34)
MCHC RBC AUTO-ENTMCNC: 33.3 G/DL (ref 31–36)
MCV RBC AUTO: 89.2 FL (ref 80–100)
MONOCYTES # BLD: 0.5 K/UL (ref 0–1.3)
MONOCYTES NFR BLD: 8.2 %
NEUTROPHILS # BLD: 3.1 K/UL (ref 1.7–7.7)
NEUTROPHILS NFR BLD: 51.6 %
NITRITE UR QL STRIP.AUTO: NEGATIVE
PH UR STRIP.AUTO: 6 [PH] (ref 5–8)
PLATELET # BLD AUTO: 262 K/UL (ref 135–450)
PMV BLD AUTO: 8 FL (ref 5–10.5)
POTASSIUM SERPL-SCNC: 4.2 MMOL/L (ref 3.5–5.1)
PROT SERPL-MCNC: 7.3 G/DL (ref 6.4–8.2)
PROT UR STRIP.AUTO-MCNC: NEGATIVE MG/DL
RBC # BLD AUTO: 5.08 M/UL (ref 4.2–5.9)
SODIUM SERPL-SCNC: 138 MMOL/L (ref 136–145)
SP GR UR STRIP.AUTO: >=1.03 (ref 1–1.03)
UA COMPLETE W REFLEX CULTURE PNL UR: NORMAL
UA DIPSTICK W REFLEX MICRO PNL UR: NORMAL
URN SPEC COLLECT METH UR: NORMAL
UROBILINOGEN UR STRIP-ACNC: 0.2 E.U./DL
WBC # BLD AUTO: 6 K/UL (ref 4–11)

## 2023-12-01 PROCEDURE — 96376 TX/PRO/DX INJ SAME DRUG ADON: CPT

## 2023-12-01 PROCEDURE — 80053 COMPREHEN METABOLIC PANEL: CPT

## 2023-12-01 PROCEDURE — 85025 COMPLETE CBC W/AUTO DIFF WBC: CPT

## 2023-12-01 PROCEDURE — 74176 CT ABD & PELVIS W/O CONTRAST: CPT

## 2023-12-01 PROCEDURE — 99284 EMERGENCY DEPT VISIT MOD MDM: CPT

## 2023-12-01 PROCEDURE — 96375 TX/PRO/DX INJ NEW DRUG ADDON: CPT

## 2023-12-01 PROCEDURE — 6360000002 HC RX W HCPCS: Performed by: PHYSICIAN ASSISTANT

## 2023-12-01 PROCEDURE — 96374 THER/PROPH/DIAG INJ IV PUSH: CPT

## 2023-12-01 PROCEDURE — 81003 URINALYSIS AUTO W/O SCOPE: CPT

## 2023-12-01 PROCEDURE — 6370000000 HC RX 637 (ALT 250 FOR IP): Performed by: PHYSICIAN ASSISTANT

## 2023-12-01 PROCEDURE — 2580000003 HC RX 258: Performed by: PHYSICIAN ASSISTANT

## 2023-12-01 RX ORDER — KETOROLAC TROMETHAMINE 30 MG/ML
15 INJECTION, SOLUTION INTRAMUSCULAR; INTRAVENOUS ONCE
Status: COMPLETED | OUTPATIENT
Start: 2023-12-01 | End: 2023-12-01

## 2023-12-01 RX ORDER — ONDANSETRON 2 MG/ML
4 INJECTION INTRAMUSCULAR; INTRAVENOUS ONCE
Status: COMPLETED | OUTPATIENT
Start: 2023-12-01 | End: 2023-12-01

## 2023-12-01 RX ORDER — MORPHINE SULFATE 4 MG/ML
4 INJECTION, SOLUTION INTRAMUSCULAR; INTRAVENOUS ONCE
Status: DISCONTINUED | OUTPATIENT
Start: 2023-12-01 | End: 2023-12-01

## 2023-12-01 RX ORDER — OXYCODONE HYDROCHLORIDE AND ACETAMINOPHEN 5; 325 MG/1; MG/1
1 TABLET ORAL ONCE
Status: COMPLETED | OUTPATIENT
Start: 2023-12-01 | End: 2023-12-01

## 2023-12-01 RX ORDER — 0.9 % SODIUM CHLORIDE 0.9 %
1000 INTRAVENOUS SOLUTION INTRAVENOUS ONCE
Status: COMPLETED | OUTPATIENT
Start: 2023-12-01 | End: 2023-12-01

## 2023-12-01 RX ADMIN — ONDANSETRON 4 MG: 2 INJECTION INTRAMUSCULAR; INTRAVENOUS at 08:50

## 2023-12-01 RX ADMIN — SODIUM CHLORIDE 1000 ML: 9 INJECTION, SOLUTION INTRAVENOUS at 08:50

## 2023-12-01 RX ADMIN — KETOROLAC TROMETHAMINE 15 MG: 30 INJECTION, SOLUTION INTRAMUSCULAR at 09:57

## 2023-12-01 RX ADMIN — KETOROLAC TROMETHAMINE 15 MG: 30 INJECTION, SOLUTION INTRAMUSCULAR at 08:50

## 2023-12-01 RX ADMIN — OXYCODONE AND ACETAMINOPHEN 1 TABLET: 5; 325 TABLET ORAL at 09:57

## 2023-12-01 ASSESSMENT — PAIN SCALES - GENERAL
PAINLEVEL_OUTOF10: 8
PAINLEVEL_OUTOF10: 7
PAINLEVEL_OUTOF10: 8

## 2023-12-01 ASSESSMENT — PAIN - FUNCTIONAL ASSESSMENT: PAIN_FUNCTIONAL_ASSESSMENT: 0-10

## 2023-12-01 ASSESSMENT — PAIN DESCRIPTION - LOCATION: LOCATION: FLANK

## 2023-12-01 ASSESSMENT — PAIN DESCRIPTION - ORIENTATION: ORIENTATION: RIGHT

## 2023-12-01 NOTE — ED NOTES
Pt with a hx of kidney stones who came to the ER c/o right flank pain that has been going on for 1 week. Pt state that he passed some stones while in the shower last week. Pt also has some nausea. Pt denies chest pain no abdomen pain and no pain in the testicles. Pt is awake alert orient x4.       Peyman Doyle RN  12/01/23 7212

## 2023-12-01 NOTE — ED PROVIDER NOTES
[facility]  ED  EMERGENCY DEPARTMENT ENCOUNTER        Pt Name: Isabel Hyatt  MRN: [id number]  [address] [facility] 1955  Date of evaluation: 12/1/2023  Provider: Waqas Acevedo PA-C  PCP: NIC Lowery - RIZWAN  ED Attending: Dalila Rodriguez MD      PANKAJ. I have evaluated this patient. CHIEF COMPLAINT:     Chief Complaint   Patient presents with    Flank Pain     \"I have a kidney stone\". R sided flank pain. Reports Hx of several kidney stones and it feels exactly the same. Pain onset 1.5 weeks ago. Location traveling down R flank over time. HISTORY OF PRESENT ILLNESS:      History provided by the patient. No limitations. Isabel Hyatt is a 79 y.o. male who arrives to the ED by private vehicle. Patient presents to the emergency department complaining of right flank pain that is been going on for 1 week. Patient states he has had kidney stones in the past.  He states he has passed a couple of them while in the shower over the last week. The pain he has in his right flank reportedly feels exactly the same as when he is passed kidney stones in the past.  He has had some nausea with it. In evaluating him, he paces around in pain. He denies any pain to his abdomen or pain into his testicles. No chest pain or shortness of breath. No identifiable exacerbating or alleviating factors in terms of pain at this time. His urologist is Dr. Marizol Medina though at this point he has not made any efforts to call or schedule an appointment. Nursing Notes were reviewed     REVIEW OF SYSTEMS:     Review of Systems  Positives and pertinent negatives as per HPI.       PAST MEDICAL HISTORY:     Past Medical History:   Diagnosis Date    Adenomatous colon polyp 04/17/09    re do 2014    Agitation     Erectile dysfunction     Family history of early CAD 04/2010    Coronary Ca+ Score = \" 10\"    Hx of blood clots     calf post op     Hyperlipidemia     Kidney stone 03/2005    Lateral epicondylitis of left

## 2023-12-07 ENCOUNTER — OFFICE VISIT (OUTPATIENT)
Dept: INTERNAL MEDICINE CLINIC | Age: 68
End: 2023-12-07

## 2023-12-07 VITALS
BODY MASS INDEX: 31.28 KG/M2 | DIASTOLIC BLOOD PRESSURE: 80 MMHG | SYSTOLIC BLOOD PRESSURE: 112 MMHG | TEMPERATURE: 98.3 F | WEIGHT: 218 LBS | HEART RATE: 82 BPM | OXYGEN SATURATION: 98 %

## 2023-12-07 DIAGNOSIS — S39.012A STRAIN OF LUMBAR REGION, INITIAL ENCOUNTER: ICD-10-CM

## 2023-12-07 DIAGNOSIS — N20.0 NEPHROLITHIASIS: ICD-10-CM

## 2023-12-07 DIAGNOSIS — M54.50 ACUTE MIDLINE LOW BACK PAIN WITHOUT SCIATICA: Primary | ICD-10-CM

## 2023-12-07 RX ORDER — KETOROLAC TROMETHAMINE 30 MG/ML
30 INJECTION, SOLUTION INTRAMUSCULAR; INTRAVENOUS ONCE
Status: COMPLETED | OUTPATIENT
Start: 2023-12-07 | End: 2023-12-07

## 2023-12-07 RX ORDER — TIZANIDINE 4 MG/1
4 TABLET ORAL 3 TIMES DAILY PRN
Qty: 30 TABLET | Refills: 0 | Status: SHIPPED | OUTPATIENT
Start: 2023-12-07

## 2023-12-07 RX ADMIN — KETOROLAC TROMETHAMINE 30 MG: 30 INJECTION, SOLUTION INTRAMUSCULAR; INTRAVENOUS at 10:45

## 2023-12-07 NOTE — PROGRESS NOTES
[place]   2023    Mary Scott (:  1955) is a 79 y.o. male, here for evaluation of the following medical concerns:    Chief Complaint   Patient presents with    Pain     Severe pain in back , he passed kidney stones on         ASSESSMENT/ PLAN  1. Acute midline low back pain without sciatica  - tiZANidine (ZANAFLEX) 4 MG tablet; Take 1 tablet by mouth 3 times daily as needed (for back pain)  Dispense: 30 tablet; Refill: 0  - ketorolac (TORADOL) injection 30 mg  2. Strain of lumbar region, initial encounter  -Physical examination significant for paravertebral tenderness more on the left. Started on Zanaflex. Discussed PT. Patient would not like to pursue it at this time. If symptoms persistent can see his orthopedic provider. No bony abnormalities noted on most recent CT. Toradol shot was given in the clinic    3. Nephrolithiasis  -Nonobstructing. Renal function at baseline. Advised to drink plenty of water. Follow-up with urology      HPI  -Patient is a 53-year-old male presenting post emergency room follow-up visit. Was recently seen in the emergency room for right flank pain. Reviewed CT imaging notable for nonobstructive nephrolithiasis. UA, CMP, CBC all unremarkable. Patient has noted persistent lower back pain since . This has gotten worse over the last 2 days. Described the pain as sharp, intermittent, exacerbated on movement of the torso, intensity 7-8/10. Denies any tingling numbness or other sensory symptoms down legs, no loss of bowel or bladder control. Denies lifting heavy loads. He does see orthopedics but decided to follow-up with us at this time    ROS    CONSTITUTIONAL:  No fevers, chills, sweats or weight changes  EYES:  No redness or visual symptoms. EARS, NOSE AND THROAT:  No difficulties with hearing. No symptoms of rhinitis or sore throat.   CARDIOVASCULAR:  No chest pains, palpitations, orthopnea or paroxysmal noctunal

## 2023-12-13 ENCOUNTER — TELEPHONE (OUTPATIENT)
Dept: ORTHOPEDIC SURGERY | Age: 68
End: 2023-12-13

## 2023-12-13 NOTE — TELEPHONE ENCOUNTER
Faxed records Marzena Rodrigez) for 1/1/2022 to date to [phone] Wendy Destinee    ID#  [id number]

## 2024-01-12 ENCOUNTER — OFFICE VISIT (OUTPATIENT)
Dept: INTERNAL MEDICINE CLINIC | Age: 69
End: 2024-01-12

## 2024-01-12 VITALS
TEMPERATURE: 97 F | HEART RATE: 69 BPM | SYSTOLIC BLOOD PRESSURE: 122 MMHG | OXYGEN SATURATION: 97 % | DIASTOLIC BLOOD PRESSURE: 76 MMHG | WEIGHT: 213.6 LBS | BODY MASS INDEX: 30.65 KG/M2

## 2024-01-12 DIAGNOSIS — G62.9 PERIPHERAL POLYNEUROPATHY: ICD-10-CM

## 2024-01-12 DIAGNOSIS — M47.26 OTHER SPONDYLOSIS WITH RADICULOPATHY, LUMBAR REGION: ICD-10-CM

## 2024-01-12 DIAGNOSIS — M54.10 RADICULAR PAIN: ICD-10-CM

## 2024-01-12 DIAGNOSIS — M54.2 CERVICAL PAIN: Primary | ICD-10-CM

## 2024-01-12 DIAGNOSIS — R59.1 LYMPHADENOPATHY: ICD-10-CM

## 2024-01-12 RX ORDER — TRAMADOL HYDROCHLORIDE 50 MG/1
50 TABLET ORAL DAILY PRN
Qty: 7 TABLET | Refills: 0 | Status: SHIPPED | OUTPATIENT
Start: 2024-01-12 | End: 2024-01-19

## 2024-01-12 ASSESSMENT — PATIENT HEALTH QUESTIONNAIRE - PHQ9
SUM OF ALL RESPONSES TO PHQ QUESTIONS 1-9: 0
SUM OF ALL RESPONSES TO PHQ QUESTIONS 1-9: 0
2. FEELING DOWN, DEPRESSED OR HOPELESS: 0
SUM OF ALL RESPONSES TO PHQ9 QUESTIONS 1 & 2: 0
SUM OF ALL RESPONSES TO PHQ QUESTIONS 1-9: 0
1. LITTLE INTEREST OR PLEASURE IN DOING THINGS: 0
SUM OF ALL RESPONSES TO PHQ QUESTIONS 1-9: 0

## 2024-01-12 ASSESSMENT — ENCOUNTER SYMPTOMS
COUGH: 0
SORE THROAT: 0
NAUSEA: 0
DIARRHEA: 0
FACIAL SWELLING: 0
VOMITING: 0
SINUS PRESSURE: 0

## 2024-01-12 NOTE — PROGRESS NOTES
Vinnie Driver  1955        Chief Complaint   Patient presents with    Follow-up     Neck        Assessment/Plan:     1. Cervical pain  Order imaging as pain has been 3+ months. No response to NSAIDs.   Worsening.   Maintain Tramadol PRN for pain.     - MRI CERVICAL SPINE WO CONTRAST; Future    2. Lymphadenopathy  May consider CT in future if increases in size  - MRI CERVICAL SPINE WO CONTRAST; Future    3. Radicular pain  - MRI CERVICAL SPINE WO CONTRAST; Future    4. Other spondylosis with radiculopathy, lumbar region  CSM reviewed.   - traMADol (ULTRAM) 50 MG tablet; Take 1 tablet by mouth daily as needed for Pain for up to 7 days. Take lowest dose possible to manage pain Max Daily Amount: 50 mg  Dispense: 7 tablet; Refill: 0    5. Peripheral polyneuropathy  Maintain Gabapentin 100mg PRN, rx'd by Dr Oliveros ([facility])      No follow-ups on file.      HPI:      Dr Dietz appt next week for lumbar radiculopathy.     Neck pain with certain positions/movements. Rest without pain. Rt and Lt sided. Some burning sensation/\"Hot\" feeling. No ear pain. No blurry vision. No difficulty swallowing.   \"Tight\" and \"full\" feeling.     Tramadol use from last OV, late in the evening use PRN.   Tizanidine - no improvement, not using.    Diclofenac 75mg - not using     Neuropathy in B feet. Dr Oliveros - [facility]. Made another appt.   Gabapentin PRN. Once a day usually, nighttime.     /76   Pulse 69   Temp 97 °F (36.1 °C)   Wt 96.9 kg (213 lb 9.6 oz)   SpO2 97%   BMI 30.65 kg/m²     Prior to Visit Medications    Medication Sig Taking? Authorizing Provider   traMADol (ULTRAM) 50 MG tablet Take 1 tablet by mouth daily as needed for Pain for up to 7 days. Take lowest dose possible to manage pain Max Daily Amount: 50 mg Yes Paco Salazar, APRN - CNP   tiZANidine (ZANAFLEX) 4 MG tablet Take 1 tablet by mouth 3 times daily as needed (for back pain) Yes Jani Moise MD   gabapentin (NEURONTIN) 100

## 2024-01-23 ENCOUNTER — HOSPITAL ENCOUNTER (OUTPATIENT)
Dept: MRI IMAGING | Age: 69
Discharge: HOME | End: 2024-01-23

## 2024-01-23 DIAGNOSIS — M54.2 CERVICAL PAIN: ICD-10-CM

## 2024-01-23 DIAGNOSIS — M54.10 RADICULAR PAIN: ICD-10-CM

## 2024-01-23 DIAGNOSIS — R59.1 LYMPHADENOPATHY: ICD-10-CM

## 2024-01-24 ENCOUNTER — TELEPHONE (OUTPATIENT)
Dept: INTERNAL MEDICINE CLINIC | Age: 69
End: 2024-01-24

## 2024-01-24 NOTE — TELEPHONE ENCOUNTER
----- Message from Pat Johnson sent at 1/24/2024  1:52 PM EST -----  Subject: Message to Provider    QUESTIONS  Information for Provider? pt was to have an MRI for cervical spine and   couldn't do it due to claustrophobic pt can go to [facility] and be   medicated pt needs to know if he can just schedule or does he needs   another order for the medication ,please call pt back asa with   information  ---------------------------------------------------------------------------  --------------  CALL BACK INFO  [phone]; OK to leave message on voicemail  ---------------------------------------------------------------------------  --------------  SCRIPT ANSWERS  Relationship to Patient? Self

## 2024-01-26 NOTE — TELEPHONE ENCOUNTER
Generally speaking we give a small dose of valium or similar as an Rx which he takes roughly an hour or so beforehand. If interested I can send

## 2024-01-30 ENCOUNTER — TELEPHONE (OUTPATIENT)
Dept: INTERNAL MEDICINE CLINIC | Age: 69
End: 2024-01-30

## 2024-01-30 DIAGNOSIS — M54.10 RADICULAR PAIN: ICD-10-CM

## 2024-01-30 DIAGNOSIS — M54.2 CERVICAL PAIN: ICD-10-CM

## 2024-01-30 DIAGNOSIS — R59.1 LYMPHADENOPATHY: ICD-10-CM

## 2024-01-30 NOTE — TELEPHONE ENCOUNTER
Patient is calling today stating he has an order for an MRI.  Patient was advised to go to an open MRI, which he did and his upper half was completely closed.  He did not complete the MRI.     He phoned [facility] and states he can receive IV sedation.  He phoned his insurance as well and this is a covered procedure.  He will need an updated order placed in the system.     Pended order

## 2024-02-02 ENCOUNTER — OFFICE VISIT (OUTPATIENT)
Dept: INTERNAL MEDICINE CLINIC | Age: 69
End: 2024-02-02

## 2024-02-02 VITALS
HEART RATE: 74 BPM | DIASTOLIC BLOOD PRESSURE: 72 MMHG | SYSTOLIC BLOOD PRESSURE: 118 MMHG | OXYGEN SATURATION: 97 % | TEMPERATURE: 98.2 F | HEIGHT: 70 IN | WEIGHT: 216.4 LBS | BODY MASS INDEX: 30.98 KG/M2

## 2024-02-02 DIAGNOSIS — M54.2 NECK PAIN: Primary | ICD-10-CM

## 2024-02-02 DIAGNOSIS — G70.00 MG (MYASTHENIA GRAVIS) (HCC): ICD-10-CM

## 2024-02-02 DIAGNOSIS — I77.74 DISSECTION, VERTEBRAL ARTERY (HCC): ICD-10-CM

## 2024-02-02 RX ORDER — METHYLPREDNISOLONE 4 MG/1
TABLET ORAL
Qty: 1 KIT | Refills: 0 | Status: SHIPPED | OUTPATIENT
Start: 2024-02-02 | End: 2024-02-08

## 2024-02-02 RX ORDER — CYCLOBENZAPRINE HCL 5 MG
5 TABLET ORAL 3 TIMES DAILY PRN
Qty: 30 TABLET | Refills: 0 | Status: SHIPPED | OUTPATIENT
Start: 2024-02-02 | End: 2024-02-12

## 2024-02-02 ASSESSMENT — ENCOUNTER SYMPTOMS
SHORTNESS OF BREATH: 0
SINUS PAIN: 0
CONSTIPATION: 0
COUGH: 0
VOMITING: 0
NAUSEA: 0
SORE THROAT: 0
CHEST TIGHTNESS: 0
DIARRHEA: 0

## 2024-02-02 NOTE — PROGRESS NOTES
[facility] PHYSICIAN PRACTICES  [facility] Internal Medicine  [address], [place] [address]  Tel:[phone]    Vinnie Driver is a 69 y.o. male who presents today for his medical conditions/complaints as noted below.  Vinnie Driver is c/o of Other (Having IV sedation for MRI needs check up )      Chief Complaint   Patient presents with    Other     Having IV sedation for MRI needs check up        HPI:     Neck Pain:  Patient complains of a several week(s) history of bilateral neck pain.  Pain is aching, sharp, throbbing, and tight in nature, with radiation to right neck base.. Pain is constant, typically severe in intensity, and is exacerbated by flexion, extension, turning right, turning left.  Associated symptoms: headache. He denies: weakness, paresthesia, rash, nausea/vomiting, fever, unexplained weight loss, visual change, arthralgias/myalgia, new bowel or bladder dysfunction, Lhermitte's phenomenon, chest pain, and shortness of breath. Precipitating factors: no known injury. Patient's history includes no prior neck problems and recurrent self limited episodes of neck pain in the past.  Previous treatment: NSAID, muscle relaxant, tramadol.  Diagnostic testing:  planned CT/MRI.  Symptoms show no change over time.            Past Medical History:   Diagnosis Date    Adenomatous colon polyp 04/17/09    re do 2014    Agitation     Erectile dysfunction     Family history of early CAD 04/2010    Coronary Ca+ Score = \" 10\"    Hx of blood clots     calf post op     Hyperlipidemia     Kidney stone 03/2005    Lateral epicondylitis of left elbow 11/13/2014    Lesion of ulnar nerve 4/2/2015    Osteoarthritis of both knees 1/13/2020    PONV (postoperative nausea and vomiting)     Shingles         Past Surgical History:   Procedure Laterality Date    ACHILLES TENDON SURGERY      rupture/repair    COLONOSCOPY  04/07/09    Tubular Adenoma    COLONOSCOPY  1/12/15    ELBOW SURGERY Right

## 2024-02-06 ENCOUNTER — TELEPHONE (OUTPATIENT)
Dept: INTERNAL MEDICINE CLINIC | Age: 69
End: 2024-02-06

## 2024-02-06 NOTE — TELEPHONE ENCOUNTER
[facility] called and stated that you put in orders and she just wants to be more clear on what you want. She stated is it a soft tissue neck or cervical spine scan. She stated if its soft tissue they can't do it without contrast if its for the cervical they can do it without. If its for soft tissue you will have to resend a new order. Call back number [phone]

## 2024-02-07 DIAGNOSIS — M54.2 NECK PAIN: Primary | ICD-10-CM

## 2024-02-07 DIAGNOSIS — M54.2 CERVICAL PAIN: ICD-10-CM

## 2024-02-09 ENCOUNTER — PATIENT MESSAGE (OUTPATIENT)
Dept: INTERNAL MEDICINE CLINIC | Age: 69
End: 2024-02-09

## 2024-02-09 ENCOUNTER — HOSPITAL ENCOUNTER (OUTPATIENT)
Dept: CT IMAGING | Age: 69
Discharge: HOME | End: 2024-02-09
Payer: MEDICARE

## 2024-02-09 DIAGNOSIS — M50.30 DDD (DEGENERATIVE DISC DISEASE), CERVICAL: Primary | ICD-10-CM

## 2024-02-09 DIAGNOSIS — M54.2 NECK PAIN: ICD-10-CM

## 2024-02-09 LAB
PERFORMED ON: ABNORMAL
POC CREATININE: 0.5 MG/DL (ref 0.8–1.3)
POC SAMPLE TYPE: ABNORMAL

## 2024-02-09 PROCEDURE — 70491 CT SOFT TISSUE NECK W/DYE: CPT

## 2024-02-09 PROCEDURE — 82565 ASSAY OF CREATININE: CPT

## 2024-02-09 PROCEDURE — 72125 CT NECK SPINE W/O DYE: CPT

## 2024-02-09 PROCEDURE — 6360000004 HC RX CONTRAST MEDICATION: Performed by: NURSE PRACTITIONER

## 2024-02-09 RX ADMIN — IOPAMIDOL 75 ML: 755 INJECTION, SOLUTION INTRAVENOUS at 08:26

## 2024-03-22 ENCOUNTER — OFFICE VISIT (OUTPATIENT)
Dept: INTERNAL MEDICINE CLINIC | Age: 69
End: 2024-03-22

## 2024-03-22 VITALS
HEIGHT: 70 IN | DIASTOLIC BLOOD PRESSURE: 72 MMHG | HEART RATE: 75 BPM | TEMPERATURE: 97.9 F | BODY MASS INDEX: 31.07 KG/M2 | OXYGEN SATURATION: 97 % | WEIGHT: 217 LBS | SYSTOLIC BLOOD PRESSURE: 128 MMHG

## 2024-03-22 DIAGNOSIS — E78.5 DYSLIPIDEMIA: ICD-10-CM

## 2024-03-22 DIAGNOSIS — M54.2 CHRONIC NECK PAIN: Primary | ICD-10-CM

## 2024-03-22 DIAGNOSIS — G89.29 CHRONIC NECK PAIN: Primary | ICD-10-CM

## 2024-03-22 RX ORDER — ATORVASTATIN CALCIUM 80 MG/1
80 TABLET, FILM COATED ORAL DAILY
Qty: 90 TABLET | Refills: 1 | Status: SHIPPED | OUTPATIENT
Start: 2024-03-22

## 2024-03-22 ASSESSMENT — ENCOUNTER SYMPTOMS
DIARRHEA: 0
CHEST TIGHTNESS: 0
SORE THROAT: 0
SINUS PAIN: 0
VOMITING: 0
COUGH: 0
CONSTIPATION: 0
SHORTNESS OF BREATH: 0

## 2024-03-22 NOTE — PROGRESS NOTES
MG (myasthenia gravis) (HCC)    Dissection, vertebral artery (HCC)    Dyslipidemia    Rash    Osteoarthritis of both knees     Past Medical History:   Diagnosis Date    Adenomatous colon polyp 04/17/09    re do 2014    Agitation     Erectile dysfunction     Family history of early CAD 04/2010    Coronary Ca+ Score = \" 10\"    Hx of blood clots     calf post op     Hyperlipidemia     Kidney stone 03/2005    Lateral epicondylitis of left elbow 11/13/2014    Lesion of ulnar nerve 4/2/2015    Osteoarthritis of both knees 1/13/2020    PONV (postoperative nausea and vomiting)     Shingles      Past Surgical History:   Procedure Laterality Date    ACHILLES TENDON SURGERY      rupture/repair    COLONOSCOPY  04/07/09    Tubular Adenoma    COLONOSCOPY  1/12/15    ELBOW SURGERY Right     EXCISION OF AURAL MASS      KIDNEY STONE SURGERY      KNEE ARTHROSCOPY      rt    PAIN MANAGEMENT PROCEDURE Right 9/15/2020    RIGHT LUMBAR THREE, FOUR, FIVE EPIDURAL STEROID INJECTION SITE CONFIRMED BY FLUOROSCOPY performed by MERCEDES Dietz MD at [facility] OR    TENOLYSIS      VASECTOMY      VASECTOMY  1992     Family History   Problem Relation Age of Onset    Cancer Father         Stomach CA    Heart Disease Father 55        MI x 2     Social History     Socioeconomic History    Marital status:      Spouse name: Not on file    Number of children: Not on file    Years of education: Not on file    Highest education level: Not on file   Occupational History    Not on file   Tobacco Use    Smoking status: Never    Smokeless tobacco: Current   Vaping Use    Vaping Use: Never used   Substance and Sexual Activity    Alcohol use: Yes     Alcohol/week: 6.0 standard drinks of alcohol     Types: 6 Cans of beer per week     Comment: 10 - 15  BEERS A WK x 15 yrs then decreased 10 a month    Drug use: No    Sexual activity: Not on file   Other Topics Concern    Not on file   Social History Narrative    Not on file     Social

## 2024-03-27 ENCOUNTER — ANESTHESIA EVENT (OUTPATIENT)
Dept: MRI IMAGING | Age: 69
End: 2024-03-27
Payer: MEDICARE

## 2024-03-27 ENCOUNTER — HOSPITAL ENCOUNTER (OUTPATIENT)
Dept: MRI IMAGING | Age: 69
Discharge: HOME | End: 2024-03-27
Payer: MEDICARE

## 2024-03-27 ENCOUNTER — ANESTHESIA (OUTPATIENT)
Dept: MRI IMAGING | Age: 69
End: 2024-03-27
Payer: MEDICARE

## 2024-03-27 VITALS
BODY MASS INDEX: 30.49 KG/M2 | SYSTOLIC BLOOD PRESSURE: 124 MMHG | DIASTOLIC BLOOD PRESSURE: 86 MMHG | HEIGHT: 70 IN | OXYGEN SATURATION: 97 % | WEIGHT: 213 LBS | HEART RATE: 89 BPM | RESPIRATION RATE: 14 BRPM | TEMPERATURE: 98.4 F

## 2024-03-27 DIAGNOSIS — M54.12 CERVICAL RADICULOPATHY: ICD-10-CM

## 2024-03-27 DIAGNOSIS — M48.02 CERVICAL SPINAL STENOSIS: ICD-10-CM

## 2024-03-27 DIAGNOSIS — F40.240 CLAUSTROPHOBIA: ICD-10-CM

## 2024-03-27 PROCEDURE — 2580000003 HC RX 258: Performed by: ANESTHESIOLOGY

## 2024-03-27 PROCEDURE — 6360000002 HC RX W HCPCS: Performed by: NURSE ANESTHETIST, CERTIFIED REGISTERED

## 2024-03-27 PROCEDURE — 2500000003 HC RX 250 WO HCPCS: Performed by: NURSE ANESTHETIST, CERTIFIED REGISTERED

## 2024-03-27 PROCEDURE — 72141 MRI NECK SPINE W/O DYE: CPT

## 2024-03-27 RX ORDER — SODIUM CHLORIDE, SODIUM LACTATE, POTASSIUM CHLORIDE, CALCIUM CHLORIDE 600; 310; 30; 20 MG/100ML; MG/100ML; MG/100ML; MG/100ML
INJECTION, SOLUTION INTRAVENOUS CONTINUOUS
Status: DISCONTINUED | OUTPATIENT
Start: 2024-03-27 | End: 2024-03-28 | Stop reason: HOSPADM

## 2024-03-27 RX ORDER — PROPOFOL 10 MG/ML
INJECTION, EMULSION INTRAVENOUS PRN
Status: DISCONTINUED | OUTPATIENT
Start: 2024-03-27 | End: 2024-03-27 | Stop reason: SDUPTHER

## 2024-03-27 RX ORDER — ONDANSETRON 2 MG/ML
4 INJECTION INTRAMUSCULAR; INTRAVENOUS
Status: DISCONTINUED | OUTPATIENT
Start: 2024-03-27 | End: 2024-03-28 | Stop reason: HOSPADM

## 2024-03-27 RX ORDER — ROCURONIUM BROMIDE 10 MG/ML
INJECTION, SOLUTION INTRAVENOUS PRN
Status: DISCONTINUED | OUTPATIENT
Start: 2024-03-27 | End: 2024-03-27 | Stop reason: SDUPTHER

## 2024-03-27 RX ORDER — NALOXONE HYDROCHLORIDE 0.4 MG/ML
INJECTION, SOLUTION INTRAMUSCULAR; INTRAVENOUS; SUBCUTANEOUS PRN
Status: DISCONTINUED | OUTPATIENT
Start: 2024-03-27 | End: 2024-03-28 | Stop reason: HOSPADM

## 2024-03-27 RX ORDER — SODIUM CHLORIDE 0.9 % (FLUSH) 0.9 %
5-40 SYRINGE (ML) INJECTION EVERY 12 HOURS SCHEDULED
Status: DISCONTINUED | OUTPATIENT
Start: 2024-03-27 | End: 2024-03-28 | Stop reason: HOSPADM

## 2024-03-27 RX ORDER — SODIUM CHLORIDE 9 MG/ML
INJECTION, SOLUTION INTRAVENOUS PRN
Status: DISCONTINUED | OUTPATIENT
Start: 2024-03-27 | End: 2024-03-28 | Stop reason: HOSPADM

## 2024-03-27 RX ORDER — HYDROCODONE BITARTRATE AND ACETAMINOPHEN 5; 325 MG/1; MG/1
TABLET ORAL
COMMUNITY
Start: 2024-02-20

## 2024-03-27 RX ORDER — CYCLOBENZAPRINE HCL 5 MG
TABLET ORAL
COMMUNITY

## 2024-03-27 RX ORDER — LIDOCAINE HYDROCHLORIDE 20 MG/ML
INJECTION, SOLUTION INFILTRATION; PERINEURAL PRN
Status: DISCONTINUED | OUTPATIENT
Start: 2024-03-27 | End: 2024-03-27 | Stop reason: SDUPTHER

## 2024-03-27 RX ORDER — ONDANSETRON 2 MG/ML
INJECTION INTRAMUSCULAR; INTRAVENOUS PRN
Status: DISCONTINUED | OUTPATIENT
Start: 2024-03-27 | End: 2024-03-27 | Stop reason: SDUPTHER

## 2024-03-27 RX ORDER — SODIUM CHLORIDE 0.9 % (FLUSH) 0.9 %
5-40 SYRINGE (ML) INJECTION PRN
Status: DISCONTINUED | OUTPATIENT
Start: 2024-03-27 | End: 2024-03-28 | Stop reason: HOSPADM

## 2024-03-27 RX ADMIN — PROPOFOL 200 MG: 10 INJECTION, EMULSION INTRAVENOUS at 10:17

## 2024-03-27 RX ADMIN — ROCURONIUM BROMIDE 50 MG: 10 INJECTION, SOLUTION INTRAVENOUS at 10:17

## 2024-03-27 RX ADMIN — SODIUM CHLORIDE, POTASSIUM CHLORIDE, SODIUM LACTATE AND CALCIUM CHLORIDE: 600; 310; 30; 20 INJECTION, SOLUTION INTRAVENOUS at 09:34

## 2024-03-27 RX ADMIN — ONDANSETRON 4 MG: 2 INJECTION INTRAMUSCULAR; INTRAVENOUS at 10:24

## 2024-03-27 RX ADMIN — LIDOCAINE HYDROCHLORIDE 80 MG: 20 INJECTION, SOLUTION INFILTRATION; PERINEURAL at 10:17

## 2024-03-27 RX ADMIN — SUGAMMADEX 100 MG: 100 INJECTION, SOLUTION INTRAVENOUS at 10:35

## 2024-03-27 RX ADMIN — SUGAMMADEX 100 MG: 100 INJECTION, SOLUTION INTRAVENOUS at 10:37

## 2024-03-27 ASSESSMENT — PAIN DESCRIPTION - DESCRIPTORS: DESCRIPTORS: ACHING

## 2024-03-27 ASSESSMENT — PAIN - FUNCTIONAL ASSESSMENT
PAIN_FUNCTIONAL_ASSESSMENT: 0-10
PAIN_FUNCTIONAL_ASSESSMENT: ACTIVITIES ARE NOT PREVENTED

## 2024-03-27 ASSESSMENT — PAIN SCALES - GENERAL: PAINLEVEL_OUTOF10: 0

## 2024-03-27 NOTE — PROGRESS NOTES
Patient admitted to PACU # 05 from OR at 05 post MRI.  Attached to PACU monitoring system and report received from anesthesia provider.  Patient was reported to be hemodynamically stable during procedure.  Pt arrived to pacu awake and placed on RA by CRNA. Pt NSR on monitor. Pt sitting up tolerating ice chips.

## 2024-03-27 NOTE — PROGRESS NOTES
PACU Transfer to \[facility]\""    Vitals:    03/27/24 1102   BP: 139/76   Pulse: 89   Resp: 14   Temp: 98.1 °F (36.7 °C)   SpO2: 97%         Intake/Output Summary (Last 24 hours) at 3/27/2024 1105  Last data filed at 3/27/2024 1105  Gross per 24 hour   Intake 760 ml   Output 0 ml   Net 760 ml       Pain assessment:  none  Pain Level: 5    Patient transferred to care of \[facility]\"" RN.    3/27/2024 11:05 AM

## 2024-03-27 NOTE — PROGRESS NOTES
Ambulatory Surgery/Procedure Discharge Note    Vitals:    03/27/24 1114   BP: 124/86   Pulse:    Resp: 14   Temp: 98.4 °F (36.9 °C)   SpO2: 97%       In: 760 [P.O.:60; I.V.:700]  Out: 0     Restroom use offered before discharge.  Yes    Pain assessment:  none  Pain Level: 0    Pt a&o x4. VSS. Pt denies pain and nausea. Reviewed d/c instructions and removed IV.      Patient discharged to home/self care. Patient discharged via wheel chair by transporter to waiting family/S.O.       3/27/2024 12:03 PM

## 2024-03-27 NOTE — ANESTHESIA PRE PROCEDURE
3 Encounters:   03/27/24 131/77   03/22/24 128/72   02/02/24 118/72       NPO Status: Time of last liquid consumption: 1700                        Time of last solid consumption: 1700                        Date of last liquid consumption: 03/26/24                        Date of last solid food consumption: 03/26/24    BMI:   Wt Readings from Last 3 Encounters:   03/27/24 96.6 kg (213 lb)   03/22/24 98.4 kg (217 lb)   03/22/24 98.4 kg (217 lb)     Body mass index is 30.56 kg/m².    CBC:   Lab Results   Component Value Date/Time    WBC 6.0 12/01/2023 08:23 AM    RBC 5.08 12/01/2023 08:23 AM    HGB 15.1 12/01/2023 08:23 AM    HCT 45.3 12/01/2023 08:23 AM    MCV 89.2 12/01/2023 08:23 AM    RDW 14.3 12/01/2023 08:23 AM     12/01/2023 08:23 AM       CMP:   Lab Results   Component Value Date/Time     12/01/2023 08:23 AM    K 4.2 12/01/2023 08:23 AM     12/01/2023 08:23 AM    CO2 27 12/01/2023 08:23 AM    BUN 16 12/01/2023 08:23 AM    CREATININE 0.5 02/09/2024 08:10 AM    CREATININE 0.8 12/01/2023 08:23 AM    GFRAA >60 09/21/2022 07:56 AM    GFRAA >60 04/10/2013 07:33 AM    AGRATIO 1.5 12/01/2023 08:23 AM    LABGLOM >60 02/09/2024 08:10 AM    GLUCOSE 125 12/01/2023 08:23 AM    PROT 7.3 12/01/2023 08:23 AM    PROT 7.1 10/17/2012 07:36 AM    CALCIUM 9.5 12/01/2023 08:23 AM    BILITOT 0.6 12/01/2023 08:23 AM    ALKPHOS 82 12/01/2023 08:23 AM    AST 30 12/01/2023 08:23 AM    ALT 31 12/01/2023 08:23 AM       POC Tests: No results for input(s): \"POCGLU\", \"POCNA\", \"POCK\", \"POCCL\", \"POCBUN\", \"POCHEMO\", \"POCHCT\" in the last 72 hours.    Coags: No results found for: \"PROTIME\", \"INR\", \"APTT\"    HCG (If Applicable): No results found for: \"PREGTESTUR\", \"PREGSERUM\", \"HCG\", \"HCGQUANT\"     ABGs: No results found for: \"PHART\", \"PO2ART\", \"NAY5ALW\", \"XHO2EWZ\", \"BEART\", \"M2LDXIRQ\"     Type & Screen (If Applicable):  No results found for: \"LABABO\", \"LABRH\"    Drug/Infectious Status (If Applicable):  No results found for:

## 2024-03-27 NOTE — DISCHARGE INSTRUCTIONS
[facility] AMBULATORY PROCEDURE DISCHARGE INSTRUCTIONS    There are potential side effects of anesthesia or sedation you may experience for the first 24 hours.  These side effects include:    Confusion or Memory loss, Dizziness, or Delayed Reaction Times   [x]A responsible person should be with you for the next 24 hours.  Do not operate any vehicles (automobiles, bicycles, motorcycles) or power tools or machinery for 24 hours.  Do not sign any legal documents or make any legal decisions for 24 hours. Do not drink alcohol for 24 hours or while taking narcotic pain medication.      Nausea    [x]Start with light diet and progress to your normal diet as you feel like eating. However, if you experience nausea or repeated episodes of vomiting which persist beyond 12-24 hours, notify your physician.  Once nausea has passed, remember to keep drinking fluids.    Difficulty Passing Urine  [x]Drink extra amounts of fluid today.  Notify your physician if you have not urinated within 8 hours after your procedure or you feel uncomfortable.      Irritated Throat from a Breathing Tube  [x]Drink extra amounts of fluid today.  Lozenges may help.    Muscle Aches  [x]You may experience some generalized body aches as your muscles recover from medications used to relax them during surgery.  These will gradually subside.    MEDICATION INSTRUCTIONS:  []Prescription(S) x     sent with you.  Use as directed.  When taking pain medications, you may experience the side effect of dizziness or drowsiness.  Do not drink alcohol or drive when taking these medications.  []Prescription(S) x          Called to Pharmacy Name and location:    [x]Give the list of your medications to your primary care physician on your next visit. Keep your med list updated and carry it with in case of emergencies.    [x] Narcotic pain medications can cause the side effect of significant constipation.  You may want to add a stool softener to your postoperative

## 2024-03-27 NOTE — FLOWSHEET NOTE
PT tolerating ice chips. Pt denies any pain. Pt wife, Archie called and updated on pt status. SDS bed requested.

## 2024-03-27 NOTE — ANESTHESIA POSTPROCEDURE EVALUATION
Department of Anesthesiology  Postprocedure Note    Patient: Vinnie Driver  MRN: [id number]  YOB: 1955  Date of evaluation: 3/27/2024    Procedure Summary       Date: 03/27/24 Room / Location: [facility]    Anesthesia Start: 1012 Anesthesia Stop: 1048    Procedure: MRI CERVICAL SPINE WO CONTRAST Diagnosis:       Cervical spinal stenosis      Cervical radiculopathy      Claustrophobia    Scheduled Providers: Kris Stoner MD Responsible Provider: Kris Stoner MD    Anesthesia Type: general ASA Status: 2            Anesthesia Type: No value filed.    Andrew Phase I: Andrew Score: 10    Andrew Phase II: Andrew Score: 10    Anesthesia Post Evaluation    Patient location during evaluation: PACU  Patient participation: complete - patient participated  Level of consciousness: awake  Pain score: 0  Airway patency: patent  Nausea & Vomiting: no nausea  Cardiovascular status: hemodynamically stable  Respiratory status: acceptable  Hydration status: stable  Pain management: satisfactory to patient    No notable events documented.

## 2024-05-01 ENCOUNTER — TELEPHONE (OUTPATIENT)
Dept: INTERNAL MEDICINE CLINIC | Age: 69
End: 2024-05-01

## 2024-05-01 NOTE — TELEPHONE ENCOUNTER
Call to patient, there are already standing orders in Epic. Patient notified of this.    He has appt scheduled for back injections. He needs a refill of pain medication which was provided by [facility]. He was wondering if they will refill.   Advised to call [facility] and if he has any problems or concerns, to contact our office back.

## 2024-05-01 NOTE — TELEPHONE ENCOUNTER
Patient called and stated he has a personal question for you. I stated is there anything I could help him with and he stated he only wanted to ask you because it is very personal. Call back number [phone]

## 2024-05-31 ENCOUNTER — HOSPITAL ENCOUNTER (OUTPATIENT)
Age: 69
Discharge: HOME | End: 2024-05-31
Payer: MEDICARE

## 2024-05-31 LAB
ANION GAP SERPL CALCULATED.3IONS-SCNC: 10 MMOL/L (ref 3–16)
APTT BLD: 26.4 SEC (ref 22.1–36.4)
BUN SERPL-MCNC: 17 MG/DL (ref 7–20)
CALCIUM SERPL-MCNC: 9.6 MG/DL (ref 8.3–10.6)
CHLORIDE SERPL-SCNC: 106 MMOL/L (ref 99–110)
CO2 SERPL-SCNC: 27 MMOL/L (ref 21–32)
CREAT SERPL-MCNC: 0.8 MG/DL (ref 0.8–1.3)
DEPRECATED RDW RBC AUTO: 14.6 % (ref 12.4–15.4)
GFR SERPLBLD CREATININE-BSD FMLA CKD-EPI: >90 ML/MIN/{1.73_M2}
GLUCOSE SERPL-MCNC: 131 MG/DL (ref 70–99)
HCT VFR BLD AUTO: 44.6 % (ref 40.5–52.5)
HGB BLD-MCNC: 14.9 G/DL (ref 13.5–17.5)
INR PPP: 0.91 (ref 0.85–1.15)
MCH RBC QN AUTO: 29.4 PG (ref 26–34)
MCHC RBC AUTO-ENTMCNC: 33.4 G/DL (ref 31–36)
MCV RBC AUTO: 88 FL (ref 80–100)
PLATELET # BLD AUTO: 236 K/UL (ref 135–450)
PMV BLD AUTO: 9.3 FL (ref 5–10.5)
POTASSIUM SERPL-SCNC: 4.9 MMOL/L (ref 3.5–5.1)
PROTHROMBIN TIME: 12.4 SEC (ref 11.9–14.9)
RBC # BLD AUTO: 5.07 M/UL (ref 4.2–5.9)
SODIUM SERPL-SCNC: 143 MMOL/L (ref 136–145)
WBC # BLD AUTO: 5.6 K/UL (ref 4–11)

## 2024-05-31 PROCEDURE — 85610 PROTHROMBIN TIME: CPT

## 2024-05-31 PROCEDURE — 36415 COLL VENOUS BLD VENIPUNCTURE: CPT

## 2024-05-31 PROCEDURE — 85027 COMPLETE CBC AUTOMATED: CPT

## 2024-05-31 PROCEDURE — 85730 THROMBOPLASTIN TIME PARTIAL: CPT

## 2024-05-31 PROCEDURE — 80048 BASIC METABOLIC PNL TOTAL CA: CPT

## 2024-06-07 ENCOUNTER — OFFICE VISIT (OUTPATIENT)
Dept: INTERNAL MEDICINE CLINIC | Age: 69
End: 2024-06-07

## 2024-06-07 VITALS
SYSTOLIC BLOOD PRESSURE: 122 MMHG | HEIGHT: 70 IN | BODY MASS INDEX: 30.86 KG/M2 | WEIGHT: 215.6 LBS | OXYGEN SATURATION: 97 % | HEART RATE: 74 BPM | DIASTOLIC BLOOD PRESSURE: 68 MMHG | TEMPERATURE: 97.3 F

## 2024-06-07 DIAGNOSIS — M50.30 DDD (DEGENERATIVE DISC DISEASE), CERVICAL: Primary | ICD-10-CM

## 2024-06-07 DIAGNOSIS — Z00.00 WELL ADULT EXAM: ICD-10-CM

## 2024-06-07 ASSESSMENT — ENCOUNTER SYMPTOMS
CHEST TIGHTNESS: 0
SHORTNESS OF BREATH: 0
NAUSEA: 0
VOMITING: 0
COUGH: 0
SORE THROAT: 0
DIARRHEA: 0
SINUS PAIN: 0
CONSTIPATION: 0

## 2024-06-07 NOTE — PROGRESS NOTES
[facility] PHYSICIAN PRACTICES  [facility] IM  [address] [place] [address]  Dept: [phone]      Preoperative Consultation    Vinnie Driver  YOB: 1955    Date of Service:  6/7/2024    Vitals:    06/07/24 0747   BP: 122/68   Pulse: 74   Temp: 97.3 °F (36.3 °C)   TempSrc: Temporal   SpO2: 97%   Weight: 97.8 kg (215 lb 9.6 oz)   Height: 1.778 m (5' 10\")      Wt Readings from Last 2 Encounters:   06/07/24 97.8 kg (215 lb 9.6 oz)   03/27/24 96.6 kg (213 lb)     BP Readings from Last 3 Encounters:   06/07/24 122/68   03/27/24 124/86   03/22/24 128/72      Chief Complaint   Patient presents with    Pre-op Exam     Pre Op physical 6/11/24, Neck Surgery Dr. Willis-Conti     Allergies   Allergen Reactions    Penicillins Nausea And Vomiting and Nausea Only     Outpatient Medications Marked as Taking for the 6/7/24 encounter (Office Visit) with Antonino Zarate APRN - CNP   Medication Sig Dispense Refill    cyclobenzaprine (FLEXERIL) 5 MG tablet       HYDROcodone-acetaminophen (NORCO) 5-325 MG per tablet       atorvastatin (LIPITOR) 80 MG tablet Take 1 tablet by mouth daily For high cholesterol 90 tablet 1    ketoconazole (NIZORAL) 2 % cream Apply topically daily. 30 g 0    clobetasol (OLUX) 0.05 % foam       econazole nitrate 1 % cream       multivitamin (THERAGRAN) per tablet Take 1 tablet by mouth daily       This patient presents to the office today for a preoperative consultation at the request of surgeon, Dr. Bennett, who plans on performing cervical discectomy, cervical vertebrae repair on June 11 at [place].  The current problem began several weeks ago, and symptoms have been worsening with time.  Conservative therapy: Yes: pain medication, which has been not very effective..    Planned anesthesia: General   Known anesthesia problems: Prolonged emergence on one occasion   Bleeding risk: No recent or remote history of abnormal bleeding  Personal or FH of DVT/PE: No    Patient objection

## 2024-07-08 ENCOUNTER — OFFICE VISIT (OUTPATIENT)
Dept: ENT CLINIC | Age: 69
End: 2024-07-08
Payer: MEDICARE

## 2024-07-08 VITALS
SYSTOLIC BLOOD PRESSURE: 127 MMHG | HEART RATE: 66 BPM | HEIGHT: 70 IN | TEMPERATURE: 97.3 F | DIASTOLIC BLOOD PRESSURE: 81 MMHG | WEIGHT: 212 LBS | BODY MASS INDEX: 30.35 KG/M2

## 2024-07-08 DIAGNOSIS — R13.10 DYSPHAGIA, UNSPECIFIED TYPE: Primary | ICD-10-CM

## 2024-07-08 PROCEDURE — 1123F ACP DISCUSS/DSCN MKR DOCD: CPT | Performed by: OTOLARYNGOLOGY

## 2024-07-08 PROCEDURE — 99202 OFFICE O/P NEW SF 15 MIN: CPT | Performed by: OTOLARYNGOLOGY

## 2024-07-08 PROCEDURE — 31575 DIAGNOSTIC LARYNGOSCOPY: CPT | Performed by: OTOLARYNGOLOGY

## 2024-07-08 ASSESSMENT — ENCOUNTER SYMPTOMS
DIARRHEA: 0
FACIAL SWELLING: 0
NAUSEA: 0
SORE THROAT: 0
COUGH: 0
SINUS PRESSURE: 0
SINUS PAIN: 0
TROUBLE SWALLOWING: 1
VOICE CHANGE: 0
SHORTNESS OF BREATH: 0
EYE PAIN: 0
CHOKING: 0
RHINORRHEA: 0
EYE ITCHING: 0
EYE REDNESS: 0

## 2024-07-08 NOTE — PROGRESS NOTES
05/31/2024    K 4.9 05/31/2024     05/31/2024     05/31/2024    CALCIUM 9.6 05/31/2024     No results found for: \"MG\"  No results found for: \"PHOS\"  Lab Results   Component Value Date    ALKPHOS 82 12/01/2023    ALT 31 12/01/2023    AST 30 12/01/2023    BILITOT 0.6 12/01/2023    ALBUMIN 4.4 12/01/2023        Review of Systems   Constitutional:  Negative for activity change, appetite change, chills, fatigue and fever.   HENT:  Positive for trouble swallowing. Negative for congestion, ear discharge, ear pain, facial swelling, hearing loss, nosebleeds, postnasal drip, rhinorrhea, sinus pressure, sinus pain, sneezing, sore throat, tinnitus and voice change.    Eyes:  Negative for pain, redness, itching and visual disturbance.   Respiratory:  Negative for cough, choking and shortness of breath.    Gastrointestinal:  Negative for diarrhea and nausea.   Endocrine: Negative for cold intolerance and heat intolerance.       Objective:   Physical Exam  Constitutional:       General: He is not in acute distress.     Appearance: He is well-developed.   HENT:      Head: Not macrocephalic and not microcephalic. No abrasion or contusion.      Mouth/Throat:      Lips: No lesions.      Mouth: No oral lesions.      Dentition: Normal dentition.      Tongue: No lesions.      Palate: No mass.      Pharynx: No oropharyngeal exudate, posterior oropharyngeal erythema or uvula swelling.      Tonsils: No tonsillar abscesses.   Neck:      Thyroid: No thyroid mass or thyromegaly.      Trachea: No tracheal tenderness or tracheal deviation.   Cardiovascular:      Rate and Rhythm: Normal rate and regular rhythm.   Pulmonary:      Breath sounds: No stridor.   Musculoskeletal:      Cervical back: Normal range of motion and neck supple. No edema or erythema. No muscular tenderness.   Lymphadenopathy:      Head:      Right side of head: No submental, submandibular, tonsillar, preauricular, posterior auricular or occipital adenopathy.      
24-Jul-2020

## 2024-07-11 ENCOUNTER — PROCEDURE VISIT (OUTPATIENT)
Dept: SPEECH THERAPY | Age: 69
End: 2024-07-11
Payer: MEDICARE

## 2024-07-11 DIAGNOSIS — R13.10 DYSPHAGIA, UNSPECIFIED TYPE: Primary | ICD-10-CM

## 2024-07-11 DIAGNOSIS — R09.A2 GLOBUS SENSATION: ICD-10-CM

## 2024-07-11 PROCEDURE — 92526 ORAL FUNCTION THERAPY: CPT | Performed by: SPEECH-LANGUAGE PATHOLOGIST

## 2024-08-05 SDOH — HEALTH STABILITY: PHYSICAL HEALTH: ON AVERAGE, HOW MANY MINUTES DO YOU ENGAGE IN EXERCISE AT THIS LEVEL?: 90 MIN

## 2024-08-05 SDOH — HEALTH STABILITY: PHYSICAL HEALTH: ON AVERAGE, HOW MANY DAYS PER WEEK DO YOU ENGAGE IN MODERATE TO STRENUOUS EXERCISE (LIKE A BRISK WALK)?: 5 DAYS

## 2024-08-06 ENCOUNTER — OFFICE VISIT (OUTPATIENT)
Dept: ORTHOPEDIC SURGERY | Age: 69
End: 2024-08-06
Payer: MEDICARE

## 2024-08-06 VITALS — BODY MASS INDEX: 30.35 KG/M2 | WEIGHT: 212 LBS | HEIGHT: 70 IN

## 2024-08-06 DIAGNOSIS — M75.42 SHOULDER IMPINGEMENT SYNDROME, LEFT: ICD-10-CM

## 2024-08-06 DIAGNOSIS — M25.512 LEFT SHOULDER PAIN, UNSPECIFIED CHRONICITY: Primary | ICD-10-CM

## 2024-08-06 DIAGNOSIS — M75.22 TENDONITIS, BICIPITAL, LEFT: ICD-10-CM

## 2024-08-06 PROCEDURE — 99213 OFFICE O/P EST LOW 20 MIN: CPT | Performed by: PHYSICIAN ASSISTANT

## 2024-08-06 PROCEDURE — 20610 DRAIN/INJ JOINT/BURSA W/O US: CPT | Performed by: PHYSICIAN ASSISTANT

## 2024-08-06 PROCEDURE — 1123F ACP DISCUSS/DSCN MKR DOCD: CPT | Performed by: PHYSICIAN ASSISTANT

## 2024-08-06 RX ORDER — BUPIVACAINE HYDROCHLORIDE 2.5 MG/ML
30 INJECTION, SOLUTION INFILTRATION; PERINEURAL ONCE
Status: COMPLETED | OUTPATIENT
Start: 2024-08-06 | End: 2024-08-06

## 2024-08-06 RX ORDER — TRIAMCINOLONE ACETONIDE 40 MG/ML
40 INJECTION, SUSPENSION INTRA-ARTICULAR; INTRAMUSCULAR ONCE
Status: COMPLETED | OUTPATIENT
Start: 2024-08-06 | End: 2024-08-06

## 2024-08-06 RX ORDER — LIDOCAINE HYDROCHLORIDE 10 MG/ML
5 INJECTION, SOLUTION INFILTRATION; PERINEURAL ONCE
Status: COMPLETED | OUTPATIENT
Start: 2024-08-06 | End: 2024-08-06

## 2024-08-06 RX ADMIN — BUPIVACAINE HYDROCHLORIDE 75 MG: 2.5 INJECTION, SOLUTION INFILTRATION; PERINEURAL at 11:41

## 2024-08-06 RX ADMIN — LIDOCAINE HYDROCHLORIDE 5 ML: 10 INJECTION, SOLUTION INFILTRATION; PERINEURAL at 11:42

## 2024-08-06 RX ADMIN — TRIAMCINOLONE ACETONIDE 40 MG: 40 INJECTION, SUSPENSION INTRA-ARTICULAR; INTRAMUSCULAR at 11:42

## 2024-08-06 NOTE — PROGRESS NOTES
Dr Bon Dyson      Date /Time 8/6/2024             11:53 AM EDT  Name Vinnie Driver             1955   [place]  [facility]  MRN [id number]                Chief Complaint   Patient presents with    Shoulder Pain     Op Np Left Shoulder         History of Present Illness    Vinnie Driver is a 69 y.o. male who presents with  left Shoulder pain.    Sent in consultation by Antonino Zarate, NIC - CNP, .      Injury Mechanism:  none.  Worker's Comp. & legal issues:   none.  Previous Treatments: Ice, Heat, and NSAIDs    Patient presents to the office today for a new problem.  Patient here with a chief complaint of left shoulder pain.  Patient's left shoulder became painful recently.  No specific injury or trauma.  He did have a cervical fusion done by Dr. Silver approximately 3 months ago.  Patient has not developed proximal lateral shoulder pain.  No radicular symptoms.  Increased pain with activities especially with internal rotation.    Past Medical History  Past Medical History:   Diagnosis Date    Adenomatous colon polyp 04/17/2009    re do 2014    Agitation     Claustrophobia     Erectile dysfunction     Family history of early CAD 04/2010    Coronary Ca+ Score = \" 10\"    Hx of blood clots     calf post op     Hyperlipidemia     Kidney stone 03/2005    Lateral epicondylitis of left elbow 11/13/2014    Lesion of ulnar nerve 04/02/2015    Osteoarthritis of both knees 01/13/2020    Shingles      Past Surgical History:   Procedure Laterality Date    ACHILLES TENDON SURGERY      rupture/repair    COLONOSCOPY  04/07/2009    Tubular Adenoma    COLONOSCOPY  01/12/2015    ELBOW SURGERY Right     EXCISION OF AURAL MASS      KIDNEY STONE SURGERY      KNEE ARTHROSCOPY      rt    PAIN MANAGEMENT PROCEDURE Right 09/15/2020    RIGHT LUMBAR THREE, FOUR, FIVE EPIDURAL STEROID INJECTION SITE CONFIRMED BY FLUOROSCOPY performed by MERCEDES Dietz MD at [facility] OR    TENOLYSIS      VASECTOMY

## 2024-08-12 ENCOUNTER — HOSPITAL ENCOUNTER (OUTPATIENT)
Dept: PHYSICAL THERAPY | Age: 69
Setting detail: THERAPIES SERIES
Discharge: HOME | End: 2024-08-12
Payer: MEDICARE

## 2024-08-12 PROCEDURE — 97140 MANUAL THERAPY 1/> REGIONS: CPT | Performed by: PHYSICAL THERAPIST

## 2024-08-12 PROCEDURE — 97161 PT EVAL LOW COMPLEX 20 MIN: CPT | Performed by: PHYSICAL THERAPIST

## 2024-08-12 PROCEDURE — 97110 THERAPEUTIC EXERCISES: CPT | Performed by: PHYSICAL THERAPIST

## 2024-08-12 NOTE — PLAN OF CARE
resistance Sets/time Reps Notes/Cues/Progressions          Supine Horz abd GTB  10 x  HEP   SL ER 2#  10 x  HEP   Bent over shoulder ext 2#  15 x  HEP; bent over not prone due to neck    No money GTB  10 x  HEP   Wall walks   10 x  HEP                        Pt education   X 8 min   HEP, POC, freq/int, gym strengthening focusing on posterior muscles avoiding pushing and OH lifting, etc    Manual Intervention (33664)  TIME     PROM of shoulder, GH mobs (inf/post) Grade 2  X 8 minutes                                 NMR re-education (34691) resistance Sets/time Reps CUES NEEDED                                      Therapeutic Activity (59811)  Sets/time                                          Modalities:    No modalities applied this session    Education/Home Exercise Program: Patient HEP program created electronically.  Refer to [employer] access code:    Access Code: [email or web address]  URL: [email or web address]  Date: 08/12/2024  Prepared by: Adriana Aquino    Exercises  - Supine Shoulder Horizontal Abduction with Resistance  - 1 x daily - 7 x weekly - 3 sets - 10 reps  - Sidelying Shoulder ER with Towel and Dumbbell  - 1 x daily - 7 x weekly - 2-3 sets - 10 reps  - Single Arm Bent Over Shoulder Extension with Dumbbell  - 1 x daily - 7 x weekly - 2-3 sets - 10 reps  - Shoulder Flexion Wall Walk  - 1 x daily - 7 x weekly - 3 sets - 10 reps  - Shoulder External Rotation and Scapular Retraction with Resistance  - 1 x daily - 7 x weekly - 2-3 sets - 10 reps  - Standing Shoulder Internal Rotation Stretch with Towel  - 1 x daily - 7 x weekly - 5 reps - 10 hold    ASSESSMENT   Assessment:   Vinnie Driver is a 69 y.o. male presenting today to Outpatient PT with signs and symptoms consistent with left shoulder impingement with possible RTC and biceps tendinopathy.    Pt. presents with the functional impairments and activity limitations listed below and would benefit from Outpatient PT to address the below impairments as

## 2024-08-22 ENCOUNTER — HOSPITAL ENCOUNTER (OUTPATIENT)
Dept: PHYSICAL THERAPY | Age: 69
Setting detail: THERAPIES SERIES
Discharge: HOME | End: 2024-08-22
Payer: MEDICARE

## 2024-08-22 PROCEDURE — 97110 THERAPEUTIC EXERCISES: CPT | Performed by: PHYSICAL THERAPIST

## 2024-08-22 PROCEDURE — 97140 MANUAL THERAPY 1/> REGIONS: CPT | Performed by: PHYSICAL THERAPIST

## 2024-08-22 NOTE — FLOWSHEET NOTE
Treatment Progress Update:  [] Patient is progressing as expected towards functional goals listed.    [] Progression is slowed due to complexities/Impairments listed.  [] Progression has been slowed due to co-morbidities.  [x] Plan just implemented, too soon (<30days) to assess goals progression   [] Goals require adjustment due to lack of progress  [] Patient is not progressing as expected and requires additional follow up with physician  [] Other:     TREATMENT PLAN     Frequency/Duration: 1-2x/week for 8-10 weeks for the following treatment interventions:    Interventions:  Therapeutic Exercise (54936) including: strength training, ROM, and functional mobility  Therapeutic Activities (44535) including: functional mobility training and education.  Neuromuscular Re-education (51944) activation and proprioception, including postural re-education.    Gait Training (21851) for normalization of ambulation patterns and AD training.   Manual Therapy (46101) as indicated to include: Passive Range of Motion, Gr I-IV mobilizations, Soft Tissue Mobilization, and Dry Needling/IASTM  Modalities as needed that may include: Cryotherapy, Electrical Stimulation, and Thermal Agents  Patient education on joint protection, postural re-education, activity modification, and progression of HEP    Plan: Cont POC- Continue emphasis/focus on exercise progression, improving proper muscle recruitment and activation/motor control patterns, and modulating pain. Next visit plan to do POC     Electronically Signed by Adriana Aquino PT  Date: 08/22/2024     Note: Portions of this note have been templated and/or copied from initial evaluation, reassessments and prior notes for documentation efficiency.    Note: If patient does not return for scheduled/recommended follow up visits, this note will serve as a discharge from care along with the most recent update on progress.    Ortho Evaluation

## 2024-08-29 ENCOUNTER — HOSPITAL ENCOUNTER (OUTPATIENT)
Dept: PHYSICAL THERAPY | Age: 69
Setting detail: THERAPIES SERIES
Discharge: HOME | End: 2024-08-29
Payer: MEDICARE

## 2024-08-29 PROCEDURE — 97140 MANUAL THERAPY 1/> REGIONS: CPT | Performed by: PHYSICAL THERAPIST

## 2024-08-29 PROCEDURE — 97110 THERAPEUTIC EXERCISES: CPT | Performed by: PHYSICAL THERAPIST

## 2024-08-29 NOTE — FLOWSHEET NOTE
[facility]- Outpatient Rehabilitation and Therapy  [address], [place] [address] office: [phone] fax: [phone]         Physical Therapy: TREATMENT/PROGRESS NOTE   Patient: Vinnie Driver (69 y.o. male)   Examination Date: 2024   :  1955 MRN: [id number]   Visit #: 3   Insurance Allowable Auth Needed   BMN []Yes    [x]No    Insurance: Payor: [facility] MEDICARE / Plan: [facility] MEDICARE ADVANTAGE / Product Type: *No Product type* /   Insurance ID: [id number] - (Medicare Managed)  Secondary Insurance (if applicable):    Treatment Diagnosis: Left shoulder pain M25.512; MM weakness M62.81; Shoulder impingement M75.42     Medical Diagnosis:  Tendonitis, bicipital, left [M75.22]   Referring Physician: Hansel Ventura PA-C  PCP: Antonino Zarate APRN - CNP     Plan of care signed (Y/N):     Date of Patient follow up with Physician:      Plan of Care Report: NO  POC update due: (10 visits /OR AUTH LIMITS, whichever is less)  2024                                             Medical History:  Comorbidities:  Prior Surgeries: Cervical surgery 2024 with disc replacement; B elbow surgery   Relevant Medical History:                                          Precautions/ Contra-indications:           Latex allergy:  NO  Pacemaker:    NO  Contraindications for Manipulation: None  Date of Surgery: Neck surgery 2024  Other:    Red Flags:  None    Suicide Screening:   The patient did not verbalize a primary behavioral concern, suicidal ideation, suicidal intent, or demonstrate suicidal behaviors.    Preferred Language for Healthcare:   [x] English       [] other:    SUBJECTIVE EXAMINATION     Patient stated complaint:   Patient reports sore golfing other day.  Felt in follow through aspect coming out of golf trap.  Patient reports had significant pain rest of the day and into the next.  Patient reports sore from massage last session.  Sleeping rolling on left shoulder wakes him.

## 2024-09-05 ENCOUNTER — HOSPITAL ENCOUNTER (OUTPATIENT)
Dept: PHYSICAL THERAPY | Age: 69
Setting detail: THERAPIES SERIES
Discharge: HOME | End: 2024-09-05
Payer: MEDICARE

## 2024-09-05 PROCEDURE — 97016 VASOPNEUMATIC DEVICE THERAPY: CPT | Performed by: PHYSICAL THERAPIST

## 2024-09-05 PROCEDURE — 97110 THERAPEUTIC EXERCISES: CPT | Performed by: PHYSICAL THERAPIST

## 2024-09-05 PROCEDURE — 97140 MANUAL THERAPY 1/> REGIONS: CPT | Performed by: PHYSICAL THERAPIST

## 2024-09-05 NOTE — FLOWSHEET NOTE
[facility]- Outpatient Rehabilitation and Therapy  [address], [place] [address] office: [phone] fax: [phone]       Physical Therapy: TREATMENT/PROGRESS NOTE   Patient: Vinnie Driver (69 y.o. male)   Examination Date: 2024   :  1955 MRN: [id number]   Visit #: 4   Insurance Allowable Auth Needed   BMN []Yes    [x]No    Insurance: Payor: [facility] MEDICARE / Plan: [facility] MEDICARE ADVANTAGE / Product Type: *No Product type* /   Insurance ID: [id number] - (Medicare Managed)  Secondary Insurance (if applicable):    Treatment Diagnosis: Left shoulder pain M25.512; MM weakness M62.81; Shoulder impingement M75.42     Medical Diagnosis:  Tendonitis, bicipital, left [M75.22]   Referring Physician: Hansel Ventura PA-C  PCP: Antonino Zarate APRN - CNP     Plan of care signed (Y/N):     Date of Patient follow up with Physician:      Plan of Care Report: NO  POC update due: (10 visits /OR AUTH LIMITS, whichever is less)  2024                                             Medical History:  Comorbidities:  Prior Surgeries: Cervical surgery 2024 with disc replacement; B elbow surgery   Relevant Medical History:                                          Precautions/ Contra-indications:           Latex allergy:  NO  Pacemaker:    NO  Contraindications for Manipulation: None  Date of Surgery: Neck surgery 2024  Other:    Red Flags:  None    Suicide Screening:   The patient did not verbalize a primary behavioral concern, suicidal ideation, suicidal intent, or demonstrate suicidal behaviors.    Preferred Language for Healthcare:   [x] English       [] other:    SUBJECTIVE EXAMINATION     Patient stated complaint:  Patient reports the next morning after last PT session was unable to lift arm.  Had increase in pain and unable to elevate above shoulder level.   Patient held from activity this weekend and feels overall less painful.  Any pushing off from arm or loading increases

## 2024-09-06 ENCOUNTER — OFFICE VISIT (OUTPATIENT)
Dept: ORTHOPEDIC SURGERY | Age: 69
End: 2024-09-06
Payer: MEDICARE

## 2024-09-06 VITALS — HEIGHT: 70 IN | BODY MASS INDEX: 30.35 KG/M2 | WEIGHT: 212 LBS

## 2024-09-06 DIAGNOSIS — S43.432A SUPERIOR GLENOID LABRUM LESION OF LEFT SHOULDER, INITIAL ENCOUNTER: ICD-10-CM

## 2024-09-06 DIAGNOSIS — M19.019 OSTEOARTHRITIS OF AC (ACROMIOCLAVICULAR) JOINT: ICD-10-CM

## 2024-09-06 DIAGNOSIS — M75.22 TENDONITIS, BICIPITAL, LEFT: ICD-10-CM

## 2024-09-06 DIAGNOSIS — M75.102 NONTRAUMATIC TEAR OF LEFT ROTATOR CUFF, UNSPECIFIED TEAR EXTENT: Primary | ICD-10-CM

## 2024-09-06 DIAGNOSIS — M25.512 LEFT SHOULDER PAIN, UNSPECIFIED CHRONICITY: ICD-10-CM

## 2024-09-06 PROCEDURE — 99204 OFFICE O/P NEW MOD 45 MIN: CPT | Performed by: ORTHOPAEDIC SURGERY

## 2024-09-06 PROCEDURE — 1123F ACP DISCUSS/DSCN MKR DOCD: CPT | Performed by: ORTHOPAEDIC SURGERY

## 2024-09-06 RX ORDER — MELOXICAM 15 MG/1
15 TABLET ORAL DAILY PRN
Qty: 30 TABLET | Refills: 0 | Status: SHIPPED | OUTPATIENT
Start: 2024-09-06

## 2024-09-06 RX ORDER — DIAZEPAM 10 MG
TABLET ORAL
Qty: 1 TABLET | Refills: 0 | Status: SHIPPED | OUTPATIENT
Start: 2024-09-06 | End: 2024-09-07

## 2024-09-06 NOTE — PROGRESS NOTES
tear-supraspinatus in the setting of SLAP tear/bicipital tenosynovitis and acromioclavicular joint arthritis.  -Time of 23 minutes was spent coordinating and discussing the clinical findings, reviewing diagnostic imaging as indicated, coordinating care with prior notes review and current clinical encounter documentation as it pertains to the patient's presenting subjective symptoms and diagnoses.  -I reviewed with the patient the imaging findings as well as clinical exam and  how it correlates to subjective symptoms.  -Additional time was taken review outside hospital previous imaging as well as SHANDA Ventura note for details   --I had a pleasant discussion with the patient today and reviewed with the patient nonoperative versus operative measures.  Understand the risk and benefits of nonoperative versus operative care with regard to risks and benefits associated with each with the above listed diagnoses, currently the patient wishes to proceed with advanced imaging workup at this time with MRI given continued pain and limitations despite previous corticosteroid injection anti-inflammatory and therapy with activity modifications.  -Mobic 15 mg p.o. daily as needed pain and OTC Tylenol per bottle as needed discomfort   -Valium 10 mg p.o. x 1 within 1 hour prior to MRI given claustrophobia.  He states previous need for conscious sedation at [facility] for MRI.  We will attempt this currently as listed for medication orally and go from there  -Patient will continue formal physical therapy as previously prescribed and reviewed for another session or 2 so as to continue with a home exercise program  -I recommended activity modification to include low impact activity   -I did not advocate for additional diagnostic and therapeutic corticosteroid injections at this time however we will look to discuss additional treatment options pending advanced imaging results correlating to symptoms and clinical examination to include

## 2024-09-09 ENCOUNTER — TELEPHONE (OUTPATIENT)
Dept: ORTHOPEDIC SURGERY | Age: 69
End: 2024-09-09

## 2024-09-09 ENCOUNTER — HOSPITAL ENCOUNTER (OUTPATIENT)
Dept: PHYSICAL THERAPY | Age: 69
Setting detail: THERAPIES SERIES
Discharge: HOME | End: 2024-09-09
Payer: MEDICARE

## 2024-09-09 PROCEDURE — 97140 MANUAL THERAPY 1/> REGIONS: CPT | Performed by: PHYSICAL THERAPIST

## 2024-09-09 PROCEDURE — 97110 THERAPEUTIC EXERCISES: CPT | Performed by: PHYSICAL THERAPIST

## 2024-09-16 ENCOUNTER — HOSPITAL ENCOUNTER (OUTPATIENT)
Dept: PHYSICAL THERAPY | Age: 69
Setting detail: THERAPIES SERIES
Discharge: HOME | End: 2024-09-16
Payer: MEDICARE

## 2024-09-16 ENCOUNTER — TELEPHONE (OUTPATIENT)
Dept: ORTHOPEDIC SURGERY | Age: 69
End: 2024-09-16

## 2024-09-16 DIAGNOSIS — F41.9 ANXIETY: Primary | ICD-10-CM

## 2024-09-16 PROCEDURE — 97140 MANUAL THERAPY 1/> REGIONS: CPT | Performed by: PHYSICAL THERAPIST

## 2024-09-16 PROCEDURE — 97110 THERAPEUTIC EXERCISES: CPT | Performed by: PHYSICAL THERAPIST

## 2024-09-17 DIAGNOSIS — F41.9 ANXIETY: Primary | ICD-10-CM

## 2024-09-17 RX ORDER — DIAZEPAM 10 MG
TABLET ORAL
Qty: 1 TABLET | Refills: 0 | Status: SHIPPED | OUTPATIENT
Start: 2024-09-17 | End: 2024-09-17

## 2024-09-19 DIAGNOSIS — F41.9 ANXIETY: Primary | ICD-10-CM

## 2024-09-19 RX ORDER — DIAZEPAM 10 MG
TABLET ORAL
Qty: 1 TABLET | Refills: 0 | Status: SHIPPED | OUTPATIENT
Start: 2024-09-19 | End: 2024-09-19

## 2024-09-23 ENCOUNTER — HOSPITAL ENCOUNTER (OUTPATIENT)
Dept: MRI IMAGING | Age: 69
Discharge: HOME | End: 2024-09-23
Attending: ORTHOPAEDIC SURGERY
Payer: MEDICARE

## 2024-09-23 DIAGNOSIS — M75.22 TENDONITIS, BICIPITAL, LEFT: ICD-10-CM

## 2024-09-23 DIAGNOSIS — M75.102 NONTRAUMATIC TEAR OF LEFT ROTATOR CUFF, UNSPECIFIED TEAR EXTENT: ICD-10-CM

## 2024-09-23 DIAGNOSIS — M19.019 OSTEOARTHRITIS OF AC (ACROMIOCLAVICULAR) JOINT: ICD-10-CM

## 2024-09-23 DIAGNOSIS — M25.512 LEFT SHOULDER PAIN, UNSPECIFIED CHRONICITY: ICD-10-CM

## 2024-09-23 DIAGNOSIS — S43.432A SUPERIOR GLENOID LABRUM LESION OF LEFT SHOULDER, INITIAL ENCOUNTER: ICD-10-CM

## 2024-09-23 PROCEDURE — 73221 MRI JOINT UPR EXTREM W/O DYE: CPT

## 2024-09-24 ENCOUNTER — TELEPHONE (OUTPATIENT)
Dept: ORTHOPEDIC SURGERY | Age: 69
End: 2024-09-24

## 2024-09-27 ENCOUNTER — OFFICE VISIT (OUTPATIENT)
Dept: ORTHOPEDIC SURGERY | Age: 69
End: 2024-09-27
Payer: MEDICARE

## 2024-09-27 VITALS — BODY MASS INDEX: 30.35 KG/M2 | HEIGHT: 70 IN | WEIGHT: 212 LBS

## 2024-09-27 DIAGNOSIS — M75.122 NONTRAUMATIC COMPLETE TEAR OF LEFT ROTATOR CUFF: Primary | ICD-10-CM

## 2024-09-27 DIAGNOSIS — M75.22 LEFT BICIPITAL TENOSYNOVITIS: ICD-10-CM

## 2024-09-27 DIAGNOSIS — M19.019 OSTEOARTHRITIS OF AC (ACROMIOCLAVICULAR) JOINT: ICD-10-CM

## 2024-09-27 PROCEDURE — 99214 OFFICE O/P EST MOD 30 MIN: CPT | Performed by: ORTHOPAEDIC SURGERY

## 2024-09-27 PROCEDURE — 1123F ACP DISCUSS/DSCN MKR DOCD: CPT | Performed by: ORTHOPAEDIC SURGERY

## 2024-09-30 ENCOUNTER — OFFICE VISIT (OUTPATIENT)
Dept: INTERNAL MEDICINE CLINIC | Age: 69
End: 2024-09-30
Payer: MEDICARE

## 2024-09-30 VITALS
DIASTOLIC BLOOD PRESSURE: 76 MMHG | SYSTOLIC BLOOD PRESSURE: 147 MMHG | BODY MASS INDEX: 30.35 KG/M2 | HEART RATE: 82 BPM | WEIGHT: 212 LBS | HEIGHT: 70 IN | OXYGEN SATURATION: 97 %

## 2024-09-30 DIAGNOSIS — M25.512 CHRONIC LEFT SHOULDER PAIN: Primary | ICD-10-CM

## 2024-09-30 DIAGNOSIS — G89.29 CHRONIC LEFT SHOULDER PAIN: Primary | ICD-10-CM

## 2024-09-30 PROCEDURE — 99214 OFFICE O/P EST MOD 30 MIN: CPT | Performed by: NURSE PRACTITIONER

## 2024-09-30 PROCEDURE — 1123F ACP DISCUSS/DSCN MKR DOCD: CPT | Performed by: NURSE PRACTITIONER

## 2024-09-30 ASSESSMENT — ENCOUNTER SYMPTOMS
SORE THROAT: 0
NAUSEA: 0
SINUS PAIN: 0
DIARRHEA: 0
CONSTIPATION: 0
VOMITING: 0
COUGH: 0
CHEST TIGHTNESS: 0
SHORTNESS OF BREATH: 0

## 2024-09-30 NOTE — PROGRESS NOTES
(10/18/2023)    Overall Financial Resource Strain (CARDIA)     Difficulty of Paying Living Expenses: Not hard at all   Food Insecurity: Unknown (1/20/2024)    Received from [facility]     Food Insecurities     Worried about running out of food: Not on file     Food Bought: Not on file   Transportation Needs: Unknown (1/20/2024)    Received from [facility]     Transportation     Worried about transportation: Not on file   Physical Activity: Sufficiently Active (8/5/2024)    Exercise Vital Sign     Days of Exercise per Week: 5 days     Minutes of Exercise per Session: 90 min   Stress: Not on file   Social Connections: Not on file   Intimate Partner Violence: Unknown (1/20/2024)    Received from [facility]     Interpersonal Safety     Feel physically or emotionally unsafe where currently live: Not on file     Harm by anyone: Not on file     Emotionally Harmed: Not on file   Housing Stability: Unknown (1/20/2024)    Received from [facility]     Housing/Utilities     Worried about losing home: Not on file     Stayed outside house: Not on file     Unable to get utilities: Not on file       Review of Systems   Constitutional:  Negative for fever.   HENT:  Negative for sinus pain and sore throat.    Respiratory:  Negative for cough, chest tightness and shortness of breath.    Cardiovascular:  Negative for chest pain and palpitations.   Gastrointestinal:  Negative for constipation, diarrhea, nausea and vomiting.   Genitourinary:  Negative for dysuria and urgency.   Musculoskeletal:  Positive for arthralgias (Left shoulder pain) and myalgias.   Skin:  Negative for rash.   Neurological:  Negative for dizziness and weakness.       All other systems were reviewed and are negative.     Physical Exam  Vitals and nursing note reviewed.   Constitutional:       Appearance: Normal appearance. He is well-developed.   HENT:      Head: Normocephalic and atraumatic.      Right Ear: Hearing and

## 2024-10-01 ENCOUNTER — OFFICE VISIT (OUTPATIENT)
Dept: ORTHOPEDIC SURGERY | Age: 69
End: 2024-10-01
Payer: MEDICARE

## 2024-10-01 VITALS — WEIGHT: 212 LBS | HEIGHT: 70 IN | BODY MASS INDEX: 30.35 KG/M2

## 2024-10-01 DIAGNOSIS — M75.22 LEFT BICIPITAL TENOSYNOVITIS: ICD-10-CM

## 2024-10-01 DIAGNOSIS — M75.102 NONTRAUMATIC TEAR OF LEFT ROTATOR CUFF, UNSPECIFIED TEAR EXTENT: Primary | ICD-10-CM

## 2024-10-01 DIAGNOSIS — M19.019 OSTEOARTHRITIS OF AC (ACROMIOCLAVICULAR) JOINT: ICD-10-CM

## 2024-10-01 PROCEDURE — 99214 OFFICE O/P EST MOD 30 MIN: CPT | Performed by: ORTHOPAEDIC SURGERY

## 2024-10-01 PROCEDURE — 1123F ACP DISCUSS/DSCN MKR DOCD: CPT | Performed by: ORTHOPAEDIC SURGERY

## 2024-10-01 PROCEDURE — L3670 SO ACRO/CLAV CAN WEB PRE OTS: HCPCS | Performed by: ORTHOPAEDIC SURGERY

## 2024-10-02 ENCOUNTER — PREP FOR PROCEDURE (OUTPATIENT)
Dept: ORTHOPEDIC SURGERY | Age: 69
End: 2024-10-02

## 2024-10-02 DIAGNOSIS — M75.102 ROTATOR CUFF SYNDROME OF LEFT SHOULDER: ICD-10-CM

## 2024-10-02 DIAGNOSIS — M75.22 BICIPITAL TENDINITIS OF LEFT SHOULDER: ICD-10-CM

## 2024-10-02 PROBLEM — M19.019 DEGENERATIVE JOINT DISEASE OF SHOULDER REGION: Status: ACTIVE | Noted: 2024-10-02

## 2024-10-02 RX ORDER — ACETAMINOPHEN 325 MG/1
1000 TABLET ORAL ONCE
Status: CANCELLED | OUTPATIENT
Start: 2024-10-02 | End: 2024-10-02

## 2024-10-02 RX ORDER — TRANEXAMIC ACID 100 MG/ML
1000 INJECTION, SOLUTION INTRAVENOUS ONCE
Status: CANCELLED | OUTPATIENT
Start: 2024-10-02 | End: 2024-10-02

## 2024-10-02 RX ORDER — MELOXICAM 7.5 MG/1
3.75 TABLET ORAL ONCE
Status: CANCELLED | OUTPATIENT
Start: 2024-10-02 | End: 2024-10-02

## 2024-10-08 NOTE — PROGRESS NOTES
Vinnie Roselenfarrah    Age 69 y.o.    male    1955    MRN [id number]    10/16/2024  Arrival Time_____________  OR Time____________166 Min     Procedure(s):  VIDEO ARTHROSCOPY LEFT SHOULDER, ROTATOR CUFF REPAIR WITH POSSIBLE ALLOGRAFT AUGMENTATION, SUBACROMIAL DECOMPRESSION, DISTAL CLAVICLE RESECTION, OPEN SUBPECTORAL BICEPS TENODESIS NOTE: INTERSCALENE SINGLE SHOT BLOCK                      General    Surgeon(s):  Palomo Fofana, MD       Phone [phone] (home)     Initals  Date  Info Source  Home  Cell         Work  _____________________________________________________________________  _____________________________________________________________________  _____________________________________________________________________  _____________________________________________________________________  _____________________________________________________________________    PCP _____________________________ Phone_________________     H&P  ________________  Bringing      Chart              Epic      DOS      Called________  EKG ________________   Bringing      Chart              Epic      DOS      Called________  LABS________________   Bringing     Chart              Epic      DOS      Called________  Cardiac Clearance ______ Bringing      Chart              Epic      DOS      Called________  Pulmonary Clearance____ Bringing      Chart              Epic      DOS      Called________    Cardiologist________________________ Phone___________________________  Pulmonologist_______________________Phone___________________________    ? Advance Directives   ? Cheondoism concerns / Waiver on Chart            [employer]________________  ? Pre-op Instructions Given /Understood          _________________________________  ? Directions to Surgery Center                          _________________________________  ? Transportation Home_______________      __________________________________  ?

## 2024-10-12 ENCOUNTER — ANESTHESIA EVENT (OUTPATIENT)
Dept: OPERATING ROOM | Age: 69
End: 2024-10-12
Payer: MEDICARE

## 2024-10-14 ENCOUNTER — HOSPITAL ENCOUNTER (OUTPATIENT)
Age: 69
Discharge: HOME | End: 2024-10-14
Payer: MEDICARE

## 2024-10-14 DIAGNOSIS — M25.512 CHRONIC LEFT SHOULDER PAIN: ICD-10-CM

## 2024-10-14 DIAGNOSIS — G89.29 CHRONIC LEFT SHOULDER PAIN: ICD-10-CM

## 2024-10-14 LAB
ANION GAP SERPL CALCULATED.3IONS-SCNC: 11 MMOL/L (ref 3–16)
BASOPHILS # BLD: 0 K/UL (ref 0–0.2)
BASOPHILS NFR BLD: 0.7 %
BUN SERPL-MCNC: 13 MG/DL (ref 7–20)
CALCIUM SERPL-MCNC: 9.7 MG/DL (ref 8.3–10.6)
CHLORIDE SERPL-SCNC: 104 MMOL/L (ref 99–110)
CO2 SERPL-SCNC: 26 MMOL/L (ref 21–32)
CREAT SERPL-MCNC: 0.8 MG/DL (ref 0.8–1.3)
DEPRECATED RDW RBC AUTO: 14.5 % (ref 12.4–15.4)
EOSINOPHIL # BLD: 0.2 K/UL (ref 0–0.6)
EOSINOPHIL NFR BLD: 2.5 %
GFR SERPLBLD CREATININE-BSD FMLA CKD-EPI: >90 ML/MIN/{1.73_M2}
GLUCOSE SERPL-MCNC: 125 MG/DL (ref 70–99)
HCT VFR BLD AUTO: 45 % (ref 40.5–52.5)
HGB BLD-MCNC: 15.1 G/DL (ref 13.5–17.5)
LYMPHOCYTES # BLD: 2 K/UL (ref 1–5.1)
LYMPHOCYTES NFR BLD: 28.7 %
MCH RBC QN AUTO: 30 PG (ref 26–34)
MCHC RBC AUTO-ENTMCNC: 33.7 G/DL (ref 31–36)
MCV RBC AUTO: 89.2 FL (ref 80–100)
MONOCYTES # BLD: 0.5 K/UL (ref 0–1.3)
MONOCYTES NFR BLD: 7.1 %
NEUTROPHILS # BLD: 4.2 K/UL (ref 1.7–7.7)
NEUTROPHILS NFR BLD: 61 %
PLATELET # BLD AUTO: 261 K/UL (ref 135–450)
PMV BLD AUTO: 8.9 FL (ref 5–10.5)
POTASSIUM SERPL-SCNC: 4.6 MMOL/L (ref 3.5–5.1)
RBC # BLD AUTO: 5.04 M/UL (ref 4.2–5.9)
SODIUM SERPL-SCNC: 141 MMOL/L (ref 136–145)
WBC # BLD AUTO: 6.9 K/UL (ref 4–11)

## 2024-10-14 PROCEDURE — 80048 BASIC METABOLIC PNL TOTAL CA: CPT

## 2024-10-14 PROCEDURE — 85025 COMPLETE CBC W/AUTO DIFF WBC: CPT

## 2024-10-14 PROCEDURE — 36415 COLL VENOUS BLD VENIPUNCTURE: CPT

## 2024-10-14 NOTE — PROGRESS NOTES
Date and time of surgery :  10/16/24 at 1145            Arrival Time:  0945     Bring Picture ID and insurance card.  Please wear simple, loose fitting clothing to the hospital. Bring  ashirt that is at least 2 sizes too big to wear home.  Bring your sling with you the day of surgery.  Do not bring valuables (money, credit cards, checkbooks, etc.)   DO NOT wear any jewelry or piercings on day of surgery.  All body piercing jewelry must be removed.  If you have dentures, they will be removed before going to the OR; we will provide you a container.  If you wear contact lenses or glasses, they will be removed; please bring a case for them.  Shower the evening before or morning of surgery with antibacterial soap.  Nothing to eat or drink after midnight the day before surgery.   You may brush your teeth and gargle the morning of surgery.  DO NOT SWALLOW WATER.   Do not take any morning meds the day of your surgery.  Aspirin, Ibuprofen, Advil, Naproxen, Vitamin E and other Anti-inflammatory products and supplements should be stopped for 5 -7days before surgery or as directed by your physician.  Do not smoke or drink any alcoholic beverages 24 hours prior to surgery.  This includes NA Beer. Refrain from the usage of any recreational drugs, including non-prescribed prescription drugs.   You MUST plan for a responsible adult to stay on site while you are here and take you home after your surgery. You will not be allowed to leave alone or drive yourself home. It is strongly suggested someone stay with you the first 24 hrs. Your surgery will be cancelled if you do not have a ride home.  To help prevent infection, change your sheets the night before surgery.   If you  have a Living Will and Durable Power of  for Healthcare, please bring in a copy.  Notify your Surgeon if you develop any illness between now and time of surgery. Cough, cold, fever, sore throat, nausea, vomiting, etc.  Please notify your surgeon if you

## 2024-10-16 ENCOUNTER — ANESTHESIA (OUTPATIENT)
Dept: OPERATING ROOM | Age: 69
End: 2024-10-16
Payer: MEDICARE

## 2024-10-16 ENCOUNTER — HOSPITAL ENCOUNTER (OUTPATIENT)
Age: 69
Setting detail: OUTPATIENT SURGERY
Discharge: HOME | End: 2024-10-16
Attending: ORTHOPAEDIC SURGERY | Admitting: ORTHOPAEDIC SURGERY
Payer: MEDICARE

## 2024-10-16 VITALS
HEART RATE: 82 BPM | OXYGEN SATURATION: 99 % | BODY MASS INDEX: 30.06 KG/M2 | SYSTOLIC BLOOD PRESSURE: 123 MMHG | RESPIRATION RATE: 20 BRPM | HEIGHT: 70 IN | DIASTOLIC BLOOD PRESSURE: 70 MMHG | WEIGHT: 210 LBS | TEMPERATURE: 97.7 F

## 2024-10-16 DIAGNOSIS — Z47.89 ORTHOPEDIC AFTERCARE: Primary | ICD-10-CM

## 2024-10-16 PROCEDURE — 7100000001 HC PACU RECOVERY - ADDTL 15 MIN: Performed by: ORTHOPAEDIC SURGERY

## 2024-10-16 PROCEDURE — 2500000003 HC RX 250 WO HCPCS: Performed by: ANESTHESIOLOGY

## 2024-10-16 PROCEDURE — 6370000000 HC RX 637 (ALT 250 FOR IP): Performed by: ANESTHESIOLOGY

## 2024-10-16 PROCEDURE — 2709999900 HC NON-CHARGEABLE SUPPLY: Performed by: ORTHOPAEDIC SURGERY

## 2024-10-16 PROCEDURE — 6360000002 HC RX W HCPCS: Performed by: ANESTHESIOLOGY

## 2024-10-16 PROCEDURE — 3700000001 HC ADD 15 MINUTES (ANESTHESIA): Performed by: ORTHOPAEDIC SURGERY

## 2024-10-16 PROCEDURE — 2500000003 HC RX 250 WO HCPCS: Performed by: NURSE ANESTHETIST, CERTIFIED REGISTERED

## 2024-10-16 PROCEDURE — 2720000010 HC SURG SUPPLY STERILE: Performed by: ORTHOPAEDIC SURGERY

## 2024-10-16 PROCEDURE — 7100000011 HC PHASE II RECOVERY - ADDTL 15 MIN: Performed by: ORTHOPAEDIC SURGERY

## 2024-10-16 PROCEDURE — 6370000000 HC RX 637 (ALT 250 FOR IP): Performed by: ORTHOPAEDIC SURGERY

## 2024-10-16 PROCEDURE — 7100000000 HC PACU RECOVERY - FIRST 15 MIN: Performed by: ORTHOPAEDIC SURGERY

## 2024-10-16 PROCEDURE — 6360000002 HC RX W HCPCS: Performed by: NURSE ANESTHETIST, CERTIFIED REGISTERED

## 2024-10-16 PROCEDURE — 2580000003 HC RX 258: Performed by: ANESTHESIOLOGY

## 2024-10-16 PROCEDURE — 2500000003 HC RX 250 WO HCPCS: Performed by: ORTHOPAEDIC SURGERY

## 2024-10-16 PROCEDURE — 64415 NJX AA&/STRD BRCH PLXS IMG: CPT | Performed by: ANESTHESIOLOGY

## 2024-10-16 PROCEDURE — 2580000003 HC RX 258: Performed by: ORTHOPAEDIC SURGERY

## 2024-10-16 PROCEDURE — 7100000010 HC PHASE II RECOVERY - FIRST 15 MIN: Performed by: ORTHOPAEDIC SURGERY

## 2024-10-16 PROCEDURE — 3600000014 HC SURGERY LEVEL 4 ADDTL 15MIN: Performed by: ORTHOPAEDIC SURGERY

## 2024-10-16 PROCEDURE — C1713 ANCHOR/SCREW BN/BN,TIS/BN: HCPCS | Performed by: ORTHOPAEDIC SURGERY

## 2024-10-16 PROCEDURE — 3700000000 HC ANESTHESIA ATTENDED CARE: Performed by: ORTHOPAEDIC SURGERY

## 2024-10-16 PROCEDURE — 6360000002 HC RX W HCPCS

## 2024-10-16 PROCEDURE — C1776 JOINT DEVICE (IMPLANTABLE): HCPCS | Performed by: ORTHOPAEDIC SURGERY

## 2024-10-16 PROCEDURE — 3600000004 HC SURGERY LEVEL 4 BASE: Performed by: ORTHOPAEDIC SURGERY

## 2024-10-16 PROCEDURE — 6360000002 HC RX W HCPCS: Performed by: ORTHOPAEDIC SURGERY

## 2024-10-16 PROCEDURE — 2580000003 HC RX 258: Performed by: NURSE ANESTHETIST, CERTIFIED REGISTERED

## 2024-10-16 PROCEDURE — 2500000003 HC RX 250 WO HCPCS

## 2024-10-16 DEVICE — SP FIBERTAK RC, DBLOAD TAPE BL/W, BLK/W
Type: IMPLANTABLE DEVICE | Site: SHOULDER | Status: FUNCTIONAL
Brand: ARTHREX®

## 2024-10-16 DEVICE — PROXIMAL TENODESIS IMPLANT SYSTEM REV: 0
Type: IMPLANTABLE DEVICE | Site: SHOULDER | Status: FUNCTIONAL
Brand: ARTHREX®

## 2024-10-16 RX ORDER — SODIUM CHLORIDE, SODIUM LACTATE, POTASSIUM CHLORIDE, CALCIUM CHLORIDE 600; 310; 30; 20 MG/100ML; MG/100ML; MG/100ML; MG/100ML
INJECTION, SOLUTION INTRAVENOUS CONTINUOUS
Status: DISCONTINUED | OUTPATIENT
Start: 2024-10-16 | End: 2024-10-16 | Stop reason: HOSPADM

## 2024-10-16 RX ORDER — DEXAMETHASONE SODIUM PHOSPHATE 10 MG/ML
INJECTION INTRAMUSCULAR; INTRAVENOUS
Status: DISCONTINUED | OUTPATIENT
Start: 2024-10-16 | End: 2024-10-16 | Stop reason: SDUPTHER

## 2024-10-16 RX ORDER — LIDOCAINE HYDROCHLORIDE 10 MG/ML
1 INJECTION, SOLUTION EPIDURAL; INFILTRATION; INTRACAUDAL; PERINEURAL
Status: DISCONTINUED | OUTPATIENT
Start: 2024-10-16 | End: 2024-10-16 | Stop reason: HOSPADM

## 2024-10-16 RX ORDER — OXYCODONE HYDROCHLORIDE 5 MG/1
5 TABLET ORAL PRN
Status: DISCONTINUED | OUTPATIENT
Start: 2024-10-16 | End: 2024-10-16 | Stop reason: HOSPADM

## 2024-10-16 RX ORDER — OXYCODONE HYDROCHLORIDE 5 MG/1
10 TABLET ORAL PRN
Status: DISCONTINUED | OUTPATIENT
Start: 2024-10-16 | End: 2024-10-16 | Stop reason: HOSPADM

## 2024-10-16 RX ORDER — SODIUM CHLORIDE 0.9 % (FLUSH) 0.9 %
5-40 SYRINGE (ML) INJECTION EVERY 12 HOURS SCHEDULED
Status: DISCONTINUED | OUTPATIENT
Start: 2024-10-16 | End: 2024-10-16 | Stop reason: HOSPADM

## 2024-10-16 RX ORDER — FENTANYL CITRATE 50 UG/ML
INJECTION, SOLUTION INTRAMUSCULAR; INTRAVENOUS
Status: DISCONTINUED | OUTPATIENT
Start: 2024-10-16 | End: 2024-10-16 | Stop reason: SDUPTHER

## 2024-10-16 RX ORDER — MIDAZOLAM HYDROCHLORIDE 1 MG/ML
2 INJECTION INTRAMUSCULAR; INTRAVENOUS
Status: COMPLETED | OUTPATIENT
Start: 2024-10-16 | End: 2024-10-16

## 2024-10-16 RX ORDER — SODIUM CHLORIDE 9 MG/ML
INJECTION, SOLUTION INTRAVENOUS PRN
Status: DISCONTINUED | OUTPATIENT
Start: 2024-10-16 | End: 2024-10-16 | Stop reason: HOSPADM

## 2024-10-16 RX ORDER — PROPOFOL 10 MG/ML
INJECTION, EMULSION INTRAVENOUS
Status: DISCONTINUED | OUTPATIENT
Start: 2024-10-16 | End: 2024-10-16 | Stop reason: SDUPTHER

## 2024-10-16 RX ORDER — SODIUM CHLORIDE 9 MG/ML
INJECTION, SOLUTION INTRAVENOUS
Status: DISCONTINUED | OUTPATIENT
Start: 2024-10-16 | End: 2024-10-16 | Stop reason: SDUPTHER

## 2024-10-16 RX ORDER — SODIUM CHLORIDE 0.9 % (FLUSH) 0.9 %
5-40 SYRINGE (ML) INJECTION PRN
Status: DISCONTINUED | OUTPATIENT
Start: 2024-10-16 | End: 2024-10-16 | Stop reason: HOSPADM

## 2024-10-16 RX ORDER — BUPIVACAINE HYDROCHLORIDE AND EPINEPHRINE 2.5; 5 MG/ML; UG/ML
INJECTION, SOLUTION EPIDURAL; INFILTRATION; INTRACAUDAL; PERINEURAL
Status: COMPLETED | OUTPATIENT
Start: 2024-10-16 | End: 2024-10-16

## 2024-10-16 RX ORDER — MELOXICAM 7.5 MG/1
3.75 TABLET ORAL ONCE
Status: DISCONTINUED | OUTPATIENT
Start: 2024-10-16 | End: 2024-10-16

## 2024-10-16 RX ORDER — BUPIVACAINE HYDROCHLORIDE 2.5 MG/ML
INJECTION, SOLUTION INFILTRATION; PERINEURAL PRN
Status: DISCONTINUED | OUTPATIENT
Start: 2024-10-16 | End: 2024-10-16 | Stop reason: ALTCHOICE

## 2024-10-16 RX ORDER — MELOXICAM 7.5 MG/1
7.5 TABLET ORAL DAILY
Status: DISCONTINUED | OUTPATIENT
Start: 2024-10-16 | End: 2024-10-16 | Stop reason: HOSPADM

## 2024-10-16 RX ORDER — OXYCODONE HYDROCHLORIDE 5 MG/1
5 TABLET ORAL EVERY 6 HOURS PRN
Qty: 20 TABLET | Refills: 0 | Status: SHIPPED | OUTPATIENT
Start: 2024-10-16 | End: 2024-10-21

## 2024-10-16 RX ORDER — ONDANSETRON 2 MG/ML
4 INJECTION INTRAMUSCULAR; INTRAVENOUS EVERY 30 MIN PRN
Status: DISCONTINUED | OUTPATIENT
Start: 2024-10-16 | End: 2024-10-16 | Stop reason: HOSPADM

## 2024-10-16 RX ORDER — EPHEDRINE SULFATE 50 MG/ML
INJECTION, SOLUTION INTRAVENOUS
Status: DISCONTINUED | OUTPATIENT
Start: 2024-10-16 | End: 2024-10-16 | Stop reason: SDUPTHER

## 2024-10-16 RX ORDER — ACETAMINOPHEN 500 MG
1000 TABLET ORAL ONCE
Status: COMPLETED | OUTPATIENT
Start: 2024-10-16 | End: 2024-10-16

## 2024-10-16 RX ORDER — SODIUM CHLORIDE 9 MG/ML
INJECTION, SOLUTION INTRAVENOUS CONTINUOUS
Status: DISCONTINUED | OUTPATIENT
Start: 2024-10-16 | End: 2024-10-16 | Stop reason: HOSPADM

## 2024-10-16 RX ORDER — LIDOCAINE HYDROCHLORIDE 20 MG/ML
INJECTION, SOLUTION INFILTRATION; PERINEURAL
Status: DISCONTINUED | OUTPATIENT
Start: 2024-10-16 | End: 2024-10-16 | Stop reason: SDUPTHER

## 2024-10-16 RX ORDER — KETOROLAC TROMETHAMINE 30 MG/ML
INJECTION, SOLUTION INTRAMUSCULAR; INTRAVENOUS
Status: DISCONTINUED | OUTPATIENT
Start: 2024-10-16 | End: 2024-10-16 | Stop reason: SDUPTHER

## 2024-10-16 RX ORDER — ONDANSETRON 2 MG/ML
INJECTION INTRAMUSCULAR; INTRAVENOUS
Status: DISCONTINUED | OUTPATIENT
Start: 2024-10-16 | End: 2024-10-16 | Stop reason: SDUPTHER

## 2024-10-16 RX ORDER — TRANEXAMIC ACID 100 MG/ML
1000 INJECTION, SOLUTION INTRAVENOUS ONCE
Status: DISCONTINUED | OUTPATIENT
Start: 2024-10-16 | End: 2024-10-16 | Stop reason: HOSPADM

## 2024-10-16 RX ORDER — NALOXONE HYDROCHLORIDE 0.4 MG/ML
INJECTION, SOLUTION INTRAMUSCULAR; INTRAVENOUS; SUBCUTANEOUS PRN
Status: DISCONTINUED | OUTPATIENT
Start: 2024-10-16 | End: 2024-10-16 | Stop reason: HOSPADM

## 2024-10-16 RX ORDER — ROCURONIUM BROMIDE 10 MG/ML
INJECTION, SOLUTION INTRAVENOUS
Status: DISCONTINUED | OUTPATIENT
Start: 2024-10-16 | End: 2024-10-16 | Stop reason: SDUPTHER

## 2024-10-16 RX ORDER — TRANEXAMIC ACID 100 MG/ML
1000 INJECTION, SOLUTION INTRAVENOUS ONCE
Status: COMPLETED | OUTPATIENT
Start: 2024-10-16 | End: 2024-10-16

## 2024-10-16 RX ORDER — LABETALOL HYDROCHLORIDE 5 MG/ML
10 INJECTION, SOLUTION INTRAVENOUS
Status: DISCONTINUED | OUTPATIENT
Start: 2024-10-16 | End: 2024-10-16 | Stop reason: HOSPADM

## 2024-10-16 RX ADMIN — HYDROMORPHONE HYDROCHLORIDE 0.5 MG: 1 INJECTION, SOLUTION INTRAMUSCULAR; INTRAVENOUS; SUBCUTANEOUS at 16:36

## 2024-10-16 RX ADMIN — PROPOFOL 30 MG: 10 INJECTION, EMULSION INTRAVENOUS at 15:06

## 2024-10-16 RX ADMIN — HYDROMORPHONE HYDROCHLORIDE 0.25 MG: 1 INJECTION, SOLUTION INTRAMUSCULAR; INTRAVENOUS; SUBCUTANEOUS at 17:12

## 2024-10-16 RX ADMIN — BUPIVACAINE HYDROCHLORIDE AND EPINEPHRINE BITARTRATE 20 ML: 2.5; .0091 INJECTION, SOLUTION EPIDURAL; INFILTRATION; INTRACAUDAL; PERINEURAL at 10:55

## 2024-10-16 RX ADMIN — ROCURONIUM BROMIDE 50 MG: 50 INJECTION, SOLUTION INTRAVENOUS at 12:49

## 2024-10-16 RX ADMIN — ONDANSETRON 4 MG: 2 INJECTION INTRAMUSCULAR; INTRAVENOUS at 15:25

## 2024-10-16 RX ADMIN — SODIUM CHLORIDE: 9 INJECTION, SOLUTION INTRAVENOUS at 11:12

## 2024-10-16 RX ADMIN — FENTANYL CITRATE 50 MCG: 50 INJECTION, SOLUTION INTRAMUSCULAR; INTRAVENOUS at 12:49

## 2024-10-16 RX ADMIN — DEXMEDETOMIDINE HYDROCHLORIDE 5 MCG: 100 INJECTION, SOLUTION INTRAVENOUS at 13:00

## 2024-10-16 RX ADMIN — MIDAZOLAM 2 MG: 1 INJECTION INTRAMUSCULAR; INTRAVENOUS at 10:58

## 2024-10-16 RX ADMIN — PROPOFOL 150 MG: 10 INJECTION, EMULSION INTRAVENOUS at 12:49

## 2024-10-16 RX ADMIN — SUGAMMADEX 200 MG: 100 INJECTION, SOLUTION INTRAVENOUS at 15:55

## 2024-10-16 RX ADMIN — SODIUM CHLORIDE: 9 INJECTION, SOLUTION INTRAVENOUS at 12:43

## 2024-10-16 RX ADMIN — ROCURONIUM BROMIDE 20 MG: 50 INJECTION, SOLUTION INTRAVENOUS at 14:50

## 2024-10-16 RX ADMIN — EPHEDRINE SULFATE 5 MG: 50 INJECTION, SOLUTION INTRAVENOUS at 13:46

## 2024-10-16 RX ADMIN — CEFAZOLIN 2000 MG: 2 INJECTION, POWDER, FOR SOLUTION INTRAVENOUS at 15:42

## 2024-10-16 RX ADMIN — EPHEDRINE SULFATE 5 MG: 50 INJECTION, SOLUTION INTRAVENOUS at 13:18

## 2024-10-16 RX ADMIN — KETOROLAC TROMETHAMINE 15 MG: 30 INJECTION, SOLUTION INTRAMUSCULAR; INTRAVENOUS at 15:24

## 2024-10-16 RX ADMIN — EPHEDRINE SULFATE 5 MG: 50 INJECTION, SOLUTION INTRAVENOUS at 13:36

## 2024-10-16 RX ADMIN — ACETAMINOPHEN 1000 MG: 500 TABLET ORAL at 10:25

## 2024-10-16 RX ADMIN — TRANEXAMIC ACID 1000 MG: 100 INJECTION, SOLUTION INTRAVENOUS at 12:53

## 2024-10-16 RX ADMIN — LIDOCAINE HYDROCHLORIDE 60 MG: 20 INJECTION, SOLUTION INFILTRATION; PERINEURAL at 12:49

## 2024-10-16 RX ADMIN — DEXAMETHASONE SODIUM PHOSPHATE 10 MG: 10 INJECTION INTRAMUSCULAR; INTRAVENOUS at 13:00

## 2024-10-16 RX ADMIN — MELOXICAM 7.5 MG: 7.5 TABLET ORAL at 10:26

## 2024-10-16 RX ADMIN — OXYCODONE 5 MG: 5 TABLET ORAL at 16:21

## 2024-10-16 RX ADMIN — FENTANYL CITRATE 50 MCG: 50 INJECTION, SOLUTION INTRAMUSCULAR; INTRAVENOUS at 13:24

## 2024-10-16 RX ADMIN — Medication 2 AMPULE: at 10:26

## 2024-10-16 RX ADMIN — CEFAZOLIN 2000 MG: 2 INJECTION, POWDER, FOR SOLUTION INTRAVENOUS at 12:52

## 2024-10-16 ASSESSMENT — PAIN - FUNCTIONAL ASSESSMENT
PAIN_FUNCTIONAL_ASSESSMENT: INTOLERABLE, UNABLE TO DO ANY ACTIVE OR PASSIVE ACTIVITIES
PAIN_FUNCTIONAL_ASSESSMENT: 0-10
PAIN_FUNCTIONAL_ASSESSMENT: INTOLERABLE, UNABLE TO DO ANY ACTIVE OR PASSIVE ACTIVITIES
PAIN_FUNCTIONAL_ASSESSMENT: 0-10

## 2024-10-16 ASSESSMENT — PAIN SCALES - GENERAL
PAINLEVEL_OUTOF10: 4
PAINLEVEL_OUTOF10: 5
PAINLEVEL_OUTOF10: 6
PAINLEVEL_OUTOF10: 4
PAINLEVEL_OUTOF10: 7
PAINLEVEL_OUTOF10: 0
PAINLEVEL_OUTOF10: 6
PAINLEVEL_OUTOF10: 6
PAINLEVEL_OUTOF10: 4
PAINLEVEL_OUTOF10: 3
PAINLEVEL_OUTOF10: 8
PAINLEVEL_OUTOF10: 6

## 2024-10-16 ASSESSMENT — PAIN DESCRIPTION - DESCRIPTORS
DESCRIPTORS: DULL;ACHING

## 2024-10-16 ASSESSMENT — PAIN DESCRIPTION - LOCATION
LOCATION: SHOULDER

## 2024-10-16 ASSESSMENT — PAIN DESCRIPTION - ORIENTATION
ORIENTATION: LEFT

## 2024-10-16 NOTE — ANESTHESIA POSTPROCEDURE EVALUATION
Department of Anesthesiology  Postprocedure Note    Patient: Vinnie Driver  MRN: [id number]  YOB: 1955  Date of evaluation: 10/16/2024    Procedure Summary       Date: 10/16/24 Room / Location: [address]    Anesthesia Start: 1243 Anesthesia Stop: 1606    Procedure: VIDEO ARTHROSCOPY LEFT SHOULDER, ROTATOR CUFF REPAIR, SUBACROMIAL DECOMPRESSION, DISTAL CLAVICLE RESECTION, OPEN SUBPECTORAL BICEPS TENODESIS (Left: Shoulder) Diagnosis:       Rotator cuff syndrome of left shoulder      Degenerative joint disease of shoulder region      Bicipital tendinitis of left shoulder      (Rotator cuff syndrome of left shoulder [M75.102])      (Degenerative joint disease of shoulder region [M19.019])      (Bicipital tendinitis of left shoulder [M75.22])    Surgeons: Palomo Fofana MD Responsible Provider: Larry Davis MD    Anesthesia Type: general ASA Status: 2            Anesthesia Type: No value filed.    Andrew Phase I: Andrew Score: 9    Andrew Phase II:      Anesthesia Post Evaluation    Patient location during evaluation: PACU  Level of consciousness: awake  Airway patency: patent  Nausea & Vomiting: no vomiting  Cardiovascular status: blood pressure returned to baseline  Respiratory status: acceptable  Hydration status: stable  Pain management: adequate    No notable events documented.

## 2024-10-16 NOTE — DISCHARGE INSTRUCTIONS
Orthopaedic Sports Medicine  Post-Operative Discharge Instructions      Pain Medication/Management  - Narcotic electronic-scribed to pharmacy listed in EPIC (Follow prescription instructions)  - IF taking a narcotic with acetaminophen then do not take additional acetaminophen.  IF not, then Tylenol (650mg) - take 1 tab every 8 hours x 1 week  - Enteric Coated Aspirin (325mg) Over The Counter - take 1 tab daily x 3 weeks with food -  Start in AM on 10/17/24  - Colace (100mg) Over The Counter - take 1 tab twice daily as needed for a stool softener  * Wean off narcotic and start Tylenol (500mg) Over The Counter - take 1-2 tabs every 8 hours as needed for pain    Nonweightbearing left arm, use sling at all times except for hygiene.  Ice left shoulder.  Elevate left wrist above left elbow and open close hand to circulate blood.  May shower on 10/19/2024, pat wounds dry air dry and then place bandaids and/or gauze/tape to cover all wounds and then return to sling use.  Call PT in 24hrs to activate PT referral to start PT on 11/15/24.  Check all paperwork for surgeon's follow up appointment date and time. Call office [phone] with any questions or concerns      * Day of Surgery - If you had a regional nerve block (extremity was numbed by anesthesia), it will wear off in 6-16 hours after surgery.  It is recommended that you start the narcotic prescription once you get home to stay ahead of pain control even if the nerve block has not worn off yet.  This will help limit severe pain from waking you up.  It is also reasonable to set your alarm for every 6 hours so that you don't get behind in your pain control.    * Elevation and Ice - For lower extremity surgery, elevate the operative extremity with rolled towels / pillows placed under the ankle/calf as tolerated.  Move the position of towels or pillows around every so often to limit undue pressure to the back of the calf/heel.  NEVER place pillows or blankets under

## 2024-10-16 NOTE — ANESTHESIA POSTPROCEDURE EVALUATION
Department of Anesthesiology  Postprocedure Note    Patient: Vinnie Driver  MRN: [id number]  YOB: 1955  Date of evaluation: 10/16/2024    Procedure Summary       Date: 10/16/24 Room / Location: [address]    Anesthesia Start: 1243 Anesthesia Stop: 1606    Procedure: VIDEO ARTHROSCOPY LEFT SHOULDER, ROTATOR CUFF REPAIR, SUBACROMIAL DECOMPRESSION, DISTAL CLAVICLE RESECTION, OPEN SUBPECTORAL BICEPS TENODESIS (Left: Shoulder) Diagnosis:       Rotator cuff syndrome of left shoulder      Degenerative joint disease of shoulder region      Bicipital tendinitis of left shoulder      (Rotator cuff syndrome of left shoulder [M75.102])      (Degenerative joint disease of shoulder region [M19.019])      (Bicipital tendinitis of left shoulder [M75.22])    Surgeons: Palomo Fofana MD Responsible Provider: Larry Davis MD    Anesthesia Type: general ASA Status: 2            Anesthesia Type: No value filed.    Andrew Phase I: Andrew Score: 9    Andrew Phase II:      Anesthesia Post Evaluation    Patient location during evaluation: PACU  Level of consciousness: awake  Airway patency: patent  Nausea & Vomiting: no vomiting  Cardiovascular status: blood pressure returned to baseline  Respiratory status: acceptable  Hydration status: stable  Pain management: adequate    No notable events documented.

## 2024-10-16 NOTE — PROGRESS NOTES
Pt placed on the cardiac monitor, SPO2, and O2 2L.  Time out performed with Roman Corrigan RN and the patient Vinnie Driver.   1955.  Video Arthroscopy Left Shoulder, Rotator Cuff Repair With Possible Allograft Augmentation, Subacromial Decompression, Distal Clavicle Resection, Open Subpectoral Biceps Tenodesis  Note: Interscalene Single Shot Block - Left

## 2024-10-16 NOTE — PERIOP NOTE
Discharged to passenger side of car with wife and driving - sling and abductor pillow in proper placement :   Pain is a level- 4  No ponv  No adverse events with advancement of activity from lying to standing -  Gait steady

## 2024-10-16 NOTE — PERIOP NOTE
Pt stated pain is a level 5-6 requested 1/2 dose ivp dilaudid - pain improved immediately   Pain level is now a 4

## 2024-10-16 NOTE — ANESTHESIA PROCEDURE NOTES
Peripheral Block    Patient location during procedure: pre-op  Reason for block: post-op pain management and at surgeon's request  Start time: 10/16/2024 10:55 AM  End time: 10/16/2024 11:05 AM  Staffing  Performed: anesthesiologist   Anesthesiologist: Larry Davis MD  Performed by: Larry Davis MD  Authorized by: Larry Davis MD    Preanesthetic Checklist  Completed: patient identified, IV checked, site marked, risks and benefits discussed, surgical/procedural consents, equipment checked, anesthesia consent given, oxygen available and monitors applied/VS acknowledged  Peripheral Block   Patient position: sitting  Prep: ChloraPrep  Provider prep: sterile gloves  Patient monitoring: responsive to questions, oxygen, IV access, frequent blood pressure checks, continuous pulse ox and cardiac monitor  Block type: Brachial plexus  Interscalene  Laterality: left  Injection technique: single-shot  Guidance: ultrasound guided    Needle   Needle type: short-bevel   Needle gauge: 20 G  Needle localization: ultrasound guidance  Needle length: 10 cm  Assessment   Injection assessment: negative aspiration for heme, no paresthesia on injection, local visualized surrounding nerve on ultrasound and no intravascular symptoms  Paresthesia pain: none  Slow fractionated injection: yes  Hemodynamics: stable  Outcomes: patient tolerated procedure well    Medications Administered  BUPivacaine 0.25%-EPINEPHrine injection 1:200000 - Perineural   20 mL - 10/16/2024 10:55:00 AM

## 2024-10-16 NOTE — OP NOTE
repair this.   Additional small arthroscopic percutaneous incisions were made as needed for instrumentation and visualization.  The tear was evaluated and determined as to the tear morphology.  The bone bed was freshened and ultimately 2 medial row double loaded anchors were placed just off the articular margin in standard fashion.  The sutures were passed through the tissue by suture passer device.  Sutures were also incorporated to repair the far anterior and far posterior remaining supraspinatus and majority of infraspinatus into the repaired primarily supraspinatus tendon edges.  This nicely repaired the defect and repaired the tendons together.  The tissue was viable with thickness albeit was tendonotic.  A decision was made not to augment with allograft given the potential anticipated location of fibertak medial repair device placement to secure the allograft to the rotator cuff tendon would have been at the myotendinous junction and would have the risk injuring the local repair viability.  The tissue was also not grossly attenuated so it would not have added any significant structural benefit in my opinion.  Ultimately the repair was probed and found to be satisfactory. There was no gross prominence of hardware and the cuff moved as one unit without any noted significant gross gap    Attention then turned to the distal clavicle resection where using a 70° scope and working anteriorly by indirect methods, 8-10 mm was resected of the distal clavicle.     All arthroscopic portals were thoroughly irrigated and closed with interrupated 3-0 nylon.  Attention then turned to the subpectoral biceps tenodesis.  A vertical incision was made perpendicular to the inferior palpated border of the pectoralis major tendon, roughly 1cm above and 2cm below this border.  Satisfactory soft tissue planes were developed bluntly.  The subpectoral space was entered after gently dissecting the deep fascial layers by blunt finger

## 2024-10-16 NOTE — H&P
ORTHOPAEDIC SURGERY INTERVAL H&P    Vinnie Driver was seen in the preoperative area, where a history and physical examination was reviewed and the patient was examined by me today. There have been no significant clinical changes since the completion of the previous recorded history and physical as well as recent progress notes dated since Sept 2024 with preop PCP clearance note 9/30/2024.  The surgical site was confirmed by the patient and me and the surgical site was marked.     The risks, benefits, and alternatives of the proposed procedure(s) have been explained to the patient (or appropriately confirmed guardian) and understanding was verbalized. Please see outpatient notes for details.  All questions were answered and a signed documented consent has been placed in the patient's chart. The patient wishes to proceed.  On call to the OR.      Electronically signed by: Palomo Fofana MD,10/16/2024,10:11 AM         Palomo Fofana MD       Orthopaedic Surgery-Sports Medicine      Disclaimer:  This note was dictated with voice recognition software.  Though review and correction are routinely performed, please contact the office/medical records for any errors requiring correction.

## 2024-10-16 NOTE — PROGRESS NOTES
Pain is in a tolerable window -requesting to be discharged   Dr Fofana out to see pt and review activity restrictions   Pt agreeable

## 2024-10-16 NOTE — ANESTHESIA PRE PROCEDURE
Department of Anesthesiology  Preprocedure Note       Name:  Vinnie Driver   Age:  69 y.o.  :  1955                                          MRN:  [id number]         Date:  10/16/2024      Surgeon: Surgeon(s):  Palomo Fofana MD    Procedure: Procedure(s):  VIDEO ARTHROSCOPY LEFT SHOULDER, ROTATOR CUFF REPAIR WITH POSSIBLE ALLOGRAFT AUGMENTATION, SUBACROMIAL DECOMPRESSION, DISTAL CLAVICLE RESECTION, OPEN SUBPECTORAL BICEPS TENODESIS NOTE: INTERSCALENE SINGLE SHOT BLOCK    Medications prior to admission:   Prior to Admission medications    Medication Sig Start Date End Date Taking? Authorizing Provider   meloxicam (MOBIC) 15 MG tablet Take 1 tablet by mouth daily as needed for Pain Take 1 tablet by mouth daily as needed for pain 24  Yes Palomo Fofana MD   cyclobenzaprine (FLEXERIL) 5 MG tablet 1 tablet as needed   Yes ProviderJocelynn MD   atorvastatin (LIPITOR) 80 MG tablet Take 1 tablet by mouth daily For high cholesterol  Patient taking differently: Take 1 tablet by mouth nightly For high cholesterol 3/22/24  Yes Antonino Zarate APRN - CNP   ketoconazole (NIZORAL) 2 % cream Apply topically daily.  Patient taking differently: Apply topically as needed 21  Yes Hansel Gao MD   clobetasol (OLUX) 0.05 % foam as needed 17  Yes Provider, MD Jocelynn   econazole nitrate 1 % cream Apply topically as needed 17  Yes ProviderJocelynn MD   multivitamin (THERAGRAN) per tablet Take 1 tablet by mouth daily   Yes Provider, MD Jocelynn       Current medications:    Current Facility-Administered Medications   Medication Dose Route Frequency Provider Last Rate Last Admin   • lidocaine PF 1 % injection 1 mL  1 mL IntraDERmal Once PRN Pancho Flores MD       • lactated ringers IV soln infusion   IntraVENous Continuous Pancho Flores  mL/hr at 10/16/24 1025 New Bag at 10/16/24 1025   • sodium chloride flush 0.9 % injection 5-40 mL  5-40 mL

## 2024-10-16 NOTE — PERIOP NOTE
Pt is improving after ivp dilaudid : level- 6  Ice and elevation implemented   Circulation  LUE is unchanged    upper

## 2024-10-17 DIAGNOSIS — Z47.89 ORTHOPEDIC AFTERCARE: Primary | ICD-10-CM

## 2024-10-28 ENCOUNTER — OFFICE VISIT (OUTPATIENT)
Dept: INTERNAL MEDICINE CLINIC | Age: 69
End: 2024-10-28

## 2024-10-28 VITALS
SYSTOLIC BLOOD PRESSURE: 128 MMHG | DIASTOLIC BLOOD PRESSURE: 82 MMHG | BODY MASS INDEX: 30.49 KG/M2 | HEIGHT: 70 IN | OXYGEN SATURATION: 98 % | HEART RATE: 75 BPM | WEIGHT: 213 LBS

## 2024-10-28 DIAGNOSIS — S39.012A LUMBAR STRAIN, INITIAL ENCOUNTER: Primary | ICD-10-CM

## 2024-10-28 RX ORDER — CYCLOBENZAPRINE HCL 10 MG
10 TABLET ORAL 3 TIMES DAILY PRN
Qty: 15 TABLET | Refills: 0 | Status: SHIPPED | OUTPATIENT
Start: 2024-10-28 | End: 2024-11-07

## 2024-10-28 RX ORDER — MELOXICAM 15 MG/1
15 TABLET ORAL DAILY PRN
Qty: 30 TABLET | Refills: 0 | Status: SHIPPED | OUTPATIENT
Start: 2024-10-28

## 2024-10-28 SDOH — ECONOMIC STABILITY: FOOD INSECURITY: WITHIN THE PAST 12 MONTHS, YOU WORRIED THAT YOUR FOOD WOULD RUN OUT BEFORE YOU GOT MONEY TO BUY MORE.: NEVER TRUE

## 2024-10-28 SDOH — ECONOMIC STABILITY: FOOD INSECURITY: WITHIN THE PAST 12 MONTHS, THE FOOD YOU BOUGHT JUST DIDN'T LAST AND YOU DIDN'T HAVE MONEY TO GET MORE.: NEVER TRUE

## 2024-10-28 SDOH — ECONOMIC STABILITY: INCOME INSECURITY: HOW HARD IS IT FOR YOU TO PAY FOR THE VERY BASICS LIKE FOOD, HOUSING, MEDICAL CARE, AND HEATING?: NOT HARD AT ALL

## 2024-10-28 NOTE — PATIENT INSTRUCTIONS
-Can continue taking Tylenol extra strength, up to 1000 mg 3 times a day  -Start taking meloxicam 15 mg once a day  -Have also sent a short prescription of muscle relaxers (e.g. cyclobenzaprine), use these sparingly up to 3 times a day as needed only  -Can continue with heating pad if you have it  -We do not recommend bed rest, slowly increase your physical activity as tolerated but do not overtly put yourself  -Follow-up with your orthopedic surgeon tomorrow as scheduled

## 2024-10-28 NOTE — PROGRESS NOTES
[facility] Office  [address]  [address]  [place] [id number]  Tel: [phone]    Vinnie Driver  1955  male    CC:   Chief Complaint   Patient presents with    Back Pain     HPI:   Patient is a 69-year-old male who presents today for an acute visit for low back pain.    Had a left rotator cuff repair about 2 weeks ago with orthopedics.  Has been dealing with that pain, given a short prescription of oxycodone that he was saving for once physical therapy starts.  Has been taken meloxicam about once a day as well as extra strength Tylenol once or twice a day.-Got hard time with that pain, causing him disrupted sleep.  States that yesterday he went for a very nice walk in the morning around the neighborhood, took a shower and then when he was descending the stairs at home his feet slipped out and he fell down onto his buttocks.  The pain has been very hard for him, difficult time sitting down.  Denies any radiation of the pain down the legs.  No numbness or tingling.  Endorses being sensitive to palpation on the left side of his lumbar area.    Denies any bladder or bowel incontinence.  Denies any fevers chills or unintentional weight gain.  No night sweats.    Past Medical History:   Diagnosis Date    Adenomatous colon polyp 04/17/2009    re do 2014    Agitation     Claustrophobia     Erectile dysfunction     Family history of early CAD 04/2010    Coronary Ca+ Score = \" 10\"    Hx of blood clots 1989    calf post op     Hyperlipidemia     Kidney stone 03/2005    Lateral epicondylitis of left elbow 11/13/2014    Lesion of ulnar nerve 04/02/2015    Osteoarthritis of both knees 01/13/2020    Shingles        Past Surgical History:   Procedure Laterality Date    ACHILLES TENDON SURGERY  1989    rupture/repair    CERVICAL FUSION  06/2024    COLONOSCOPY  04/07/2009    Tubular Adenoma    COLONOSCOPY  01/12/2015    ELBOW SURGERY Bilateral     EXCISION OF AURAL MASS      KIDNEY STONE SURGERY

## 2024-10-29 ENCOUNTER — OFFICE VISIT (OUTPATIENT)
Dept: ORTHOPEDIC SURGERY | Age: 69
End: 2024-10-29

## 2024-10-29 VITALS — BODY MASS INDEX: 30.49 KG/M2 | WEIGHT: 213 LBS | HEIGHT: 70 IN

## 2024-10-29 DIAGNOSIS — Z47.89 ORTHOPEDIC AFTERCARE: Primary | ICD-10-CM

## 2024-10-29 PROCEDURE — 99024 POSTOP FOLLOW-UP VISIT: CPT | Performed by: ORTHOPAEDIC SURGERY

## 2024-10-29 NOTE — PROGRESS NOTES
POST OPERATIVE ORTHOPAEDIC NOTE    DOS: 10/16/2024  PROCEDURES: Left shoulder arthroscopic massive rotator cuff repair, subacromial decompression and distal clavicle resection and open subpectoral biceps tenodesis    The patient has been recovering. The patient states the pain is 2/10 pain.  The patient has not started PT. patient is accompanied by his wife.  He has been nonweightbearing in the sling    Focused pertinent physical examination of the operative extremity:  Wounds C/D/I, well healing surgical incisions  No erythema or drainage  Skin intact throughout  5/5 G IO EPL  SILT Ax, R, U, M  +2 radial pulse     Diagnosis Orders   1. Orthopedic aftercare          Assessment and plan: The patient is now 13 days status post the above listed procedure and is recovering    .    --Greater than 50% of the time (11/20 minutes) was spent coordinating care and discussing postoperative recovery course  -I had a pleasant discussion with the patient and his wife today.  I reviewed with them both that currently his clinical examination is well-appearing.  I reviewed with him intraoperative procedures as well as arthroscopic photos  -OTC Tylenol per bottle as needed discomfort  -Patient will utilize Eliquis given prior history of DVT.  This will be Eliquis 2.5 mg p.o. twice daily for 21 days in total postoperatively  -Formal physical therapy will start at the 1 month postoperative point and will follow massive rotator cuff repair protocol with biceps tenodesis protocol  -Sutures were removed and Steri-Strips were placed.  Patient was educated as to scar tissue massage  -All questions answered to the patient's satisfaction and the patient expressed understanding and agreement with the above listed treatment plan  -Follow up in 4 weeks time for routine 6-week postop visit  -Thank you for the clinical consultation and allowing me to participate in the patient's care.      Electronically signed by Palomo Fofana MD on 10/29/24

## 2024-11-14 ENCOUNTER — APPOINTMENT (OUTPATIENT)
Dept: PHYSICAL THERAPY | Age: 69
End: 2024-11-14
Payer: MEDICARE

## 2024-11-14 ENCOUNTER — HOSPITAL ENCOUNTER (OUTPATIENT)
Dept: PHYSICAL THERAPY | Age: 69
Setting detail: THERAPIES SERIES
Discharge: HOME | End: 2024-11-14
Payer: MEDICARE

## 2024-11-14 PROCEDURE — 97110 THERAPEUTIC EXERCISES: CPT | Performed by: PHYSICAL THERAPIST

## 2024-11-14 PROCEDURE — 97161 PT EVAL LOW COMPLEX 20 MIN: CPT | Performed by: PHYSICAL THERAPIST

## 2024-11-14 NOTE — PLAN OF CARE
Biceps Teno           Latex allergy:  NO  Pacemaker:    NO  Contraindications for Manipulation: None and recent surgical history (relative)  Date of Surgery: 10/16/24  Other:    Red Flags:  None    Suicide Screening:   The patient did not verbalize a primary behavioral concern, suicidal ideation, suicidal intent, or demonstrate suicidal behaviors.    Preferred Language for Healthcare:   [x] English       [] other:    SUBJECTIVE EXAMINATION     Patient stated complaint:    PREHAB:  Patient reports surgery for neck in June 2024.  Patient denies any radicular symptoms prior or after surgery.  6 weeks ago, left shoulder pain started.  Patient reports Dr. Jaylan Conti told him to see MD for shoulder.  Pain with rolling onto the left.  Pain with across the body and overhead.  Last week received cortisone injection in left which did seem to help.  Less pain with movements now.  Patient is active gym goer.  Denies any shoulder trauma or injury.  RHD.  Hx of AC joint on right shoulder several years ago.        SX:  10/16/24 for left shoulder.    Patient reports starting to wean off pain medication.  Patient reports struggling with depression early after surgery.  Patient is 4 weeks s/p.  Follows up with MD at 6 weeks s/p.  Feeling better these past 2 weeks.  Sleeping elevated in bed.  Any quick movements cause pain.  Feels minimal pain with sitting.  Compliant with sling.  Independent with dressing and showering.  Needs help with don/ doff shoes and socks.  Denies n/t into the hands only occasionally after prolonged sitting with sling on.  Resolves quickly.  Working on  strength.  Tylenol during day.  Does have some swelling in arm.  Icing PRN.        Test used Initial score  11/14/24 11/14/2024   Pain Summary VAS 2-8/10    Functional questionnaire Quick DASH 41/55= 68%    Other:              Pain:  Pain location: Bicep, anterior shoulder   Patient describes pain to be intermittent, Sharp, dull, aching, and

## 2024-11-18 ENCOUNTER — HOSPITAL ENCOUNTER (OUTPATIENT)
Dept: PHYSICAL THERAPY | Age: 69
Setting detail: THERAPIES SERIES
Discharge: HOME | End: 2024-11-18
Payer: MEDICARE

## 2024-11-18 PROCEDURE — 97016 VASOPNEUMATIC DEVICE THERAPY: CPT | Performed by: PHYSICAL THERAPIST

## 2024-11-18 PROCEDURE — 97140 MANUAL THERAPY 1/> REGIONS: CPT | Performed by: PHYSICAL THERAPIST

## 2024-11-18 PROCEDURE — 97110 THERAPEUTIC EXERCISES: CPT | Performed by: PHYSICAL THERAPIST

## 2024-11-18 NOTE — FLOWSHEET NOTE
[facility]- Outpatient Rehabilitation and Therapy  [address], [place] [address] office: [phone] fax: [phone]      Physical Therapy: TREATMENT/PROGRESS NOTE   Patient: Vinnie Driver (69 y.o. male)   Examination Date: 2024   :  1955 MRN: [id number]   Visit #: 2   Insurance Allowable Auth Needed   BMN []Yes    []No    Insurance: Payor: [facility] MEDICARE / Plan: [facility] MEDICARE ADVANTAGE / Product Type: *No Product type* /   Insurance ID: [id number] - (Medicare Managed)  Secondary Insurance (if applicable):    Treatment Diagnosis: Left shoulder pain M25.512; Left shoulder stiffness M25.612; MM weakness M62.81     Medical Diagnosis:  Orthopedic aftercare [Z47.89]   Referring Physician: Hansel Ventura PA-C  PCP: Antonino Zarate APRN - CNP     Plan of care signed (Y/N):     Date of Patient follow up with Physician:      Plan of Care Report: NO  POC update due: (10 visits /OR AUTH LIMITS, whichever is less)  2024                                             Medical History:  Comorbidities:  Prior Surgeries: Cervical surgery 2024 with disc replacement; B elbow surgery   Relevant Medical History:                                          Precautions/ Contra-indications:  Follow Massive RTC repair with Biceps Teno           Latex allergy:  NO  Pacemaker:    NO  Contraindications for Manipulation: None and recent surgical history (relative)  Date of Surgery: 10/16/24  Other:    Red Flags:  None    Suicide Screening:   The patient did not verbalize a primary behavioral concern, suicidal ideation, suicidal intent, or demonstrate suicidal behaviors.    Preferred Language for Healthcare:   [x] English       [] other:    SUBJECTIVE EXAMINATION     Patient stated complaint:  Reports pain has increased since starting exercises.  Sitting at 7/10 this morning.  Pain with HEP.  Patient does not feel overdoing exercises.  2 x day.  Patient is compliant with ice.  Waking him up

## 2024-11-20 ENCOUNTER — APPOINTMENT (OUTPATIENT)
Dept: PHYSICAL THERAPY | Age: 69
End: 2024-11-20
Payer: MEDICARE

## 2024-11-22 ENCOUNTER — HOSPITAL ENCOUNTER (OUTPATIENT)
Dept: PHYSICAL THERAPY | Age: 69
Setting detail: THERAPIES SERIES
Discharge: HOME | End: 2024-11-22
Payer: MEDICARE

## 2024-11-22 PROCEDURE — 97140 MANUAL THERAPY 1/> REGIONS: CPT | Performed by: PHYSICAL THERAPIST

## 2024-11-22 PROCEDURE — 97110 THERAPEUTIC EXERCISES: CPT | Performed by: PHYSICAL THERAPIST

## 2024-11-22 PROCEDURE — 97016 VASOPNEUMATIC DEVICE THERAPY: CPT | Performed by: PHYSICAL THERAPIST

## 2024-11-22 NOTE — FLOWSHEET NOTE
[facility]- Outpatient Rehabilitation and Therapy  [address], [place] [address] office: [phone] fax: [phone]      Physical Therapy: TREATMENT/PROGRESS NOTE   Patient: Vinnie Driver (69 y.o. male)   Examination Date: 2024   :  1955 MRN: [id number]   Visit #: 3   Insurance Allowable Auth Needed   BMN []Yes    []No    Insurance: Payor: [facility] MEDICARE / Plan: [facility] MEDICARE ADVANTAGE / Product Type: *No Product type* /   Insurance ID: [id number] - (Medicare Managed)  Secondary Insurance (if applicable):    Treatment Diagnosis: Left shoulder pain M25.512; Left shoulder stiffness M25.612; MM weakness M62.81     Medical Diagnosis:  Orthopedic aftercare [Z47.89]   Referring Physician: Hansel Ventura PA-C  PCP: Antonino Zarate APRN - CNP     Plan of care signed (Y/N):     Date of Patient follow up with Physician:      Plan of Care Report: NO  POC update due: (10 visits /OR AUTH LIMITS, whichever is less)  2024                                             Medical History:  Comorbidities:  Prior Surgeries: Cervical surgery 2024 with disc replacement; B elbow surgery   Relevant Medical History:                                          Precautions/ Contra-indications:  Follow Massive RTC repair with Biceps Teno           Latex allergy:  NO  Pacemaker:    NO  Contraindications for Manipulation: None and recent surgical history (relative)  Date of Surgery: 10/16/24  Other:    Red Flags:  None    Suicide Screening:   The patient did not verbalize a primary behavioral concern, suicidal ideation, suicidal intent, or demonstrate suicidal behaviors.    Preferred Language for Healthcare:   [x] English       [] other:    SUBJECTIVE EXAMINATION     Patient stated complaint:  Pain continues to be issue.  -7/10.  Pain is constant.  May reach out to MD in regards to pain.  5 weeks s/p.     PREHAB:  Patient reports surgery for neck in 2024.  Patient denies any radicular

## 2024-11-25 ENCOUNTER — HOSPITAL ENCOUNTER (OUTPATIENT)
Dept: PHYSICAL THERAPY | Age: 69
Setting detail: THERAPIES SERIES
Discharge: HOME | End: 2024-11-25
Payer: MEDICARE

## 2024-11-25 PROCEDURE — 97016 VASOPNEUMATIC DEVICE THERAPY: CPT | Performed by: PHYSICAL THERAPIST

## 2024-11-25 PROCEDURE — 97110 THERAPEUTIC EXERCISES: CPT | Performed by: PHYSICAL THERAPIST

## 2024-11-25 PROCEDURE — 97140 MANUAL THERAPY 1/> REGIONS: CPT | Performed by: PHYSICAL THERAPIST

## 2024-11-25 NOTE — FLOWSHEET NOTE
denies any radicular symptoms prior or after surgery.  6 weeks ago, left shoulder pain started.  Patient reports Dr. Jaylan Conti told him to see MD for shoulder.  Pain with rolling onto the left.  Pain with across the body and overhead.  Last week received cortisone injection in left which did seem to help.  Less pain with movements now.  Patient is active gym goer.  Denies any shoulder trauma or injury.  RHD.  Hx of AC joint on right shoulder several years ago.        SX:  10/16/24 for left shoulder.    Patient reports starting to wean off pain medication.  Patient reports struggling with depression early after surgery.  Patient is 4 weeks s/p.  Follows up with MD at 6 weeks s/p.  Feeling better these past 2 weeks.  Sleeping elevated in bed.  Any quick movements cause pain.  Feels minimal pain with sitting.  Compliant with sling.  Independent with dressing and showering.  Needs help with don/ doff shoes and socks.  Denies n/t into the hands only occasionally after prolonged sitting with sling on.  Resolves quickly.  Working on  strength.  Tylenol during day.  Does have some swelling in arm.  Icing PRN.        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

## 2024-11-26 ENCOUNTER — OFFICE VISIT (OUTPATIENT)
Dept: ORTHOPEDIC SURGERY | Age: 69
End: 2024-11-26

## 2024-11-26 VITALS — WEIGHT: 213 LBS | BODY MASS INDEX: 30.49 KG/M2 | HEIGHT: 70 IN

## 2024-11-26 DIAGNOSIS — M25.512 TRIGGER POINT OF LEFT SHOULDER REGION: ICD-10-CM

## 2024-11-26 DIAGNOSIS — Z47.89 ORTHOPEDIC AFTERCARE: Primary | ICD-10-CM

## 2024-11-26 PROCEDURE — 99024 POSTOP FOLLOW-UP VISIT: CPT | Performed by: ORTHOPAEDIC SURGERY

## 2024-11-26 RX ORDER — MELOXICAM 15 MG/1
15 TABLET ORAL DAILY PRN
Qty: 30 TABLET | Refills: 0 | Status: SHIPPED | OUTPATIENT
Start: 2024-11-26

## 2024-11-26 NOTE — PROGRESS NOTES
POST OPERATIVE ORTHOPAEDIC NOTE    DOS: 10/16/2024  PROCEDURES: Left shoulder arthroscopic massive rotator cuff repair, subacromial decompression and distal clavicle resection and open subpectoral biceps tenodesis    The patient has been recovering. The patient states the pain is 7/10.  The patient continued PT.  Patient has been nonweightbearing in the sling.  He just tarted therapy 2 weeks ago.  He feels the shoulder pain is more so periscapular and over the incisions    Focused pertinent physical examination of the operative extremity:  Wounds C/D/I, well healed surgical incisions  Positive scar tissue at arthroscopic portals anterior and biceps incision  90 degrees forward flexion active range of motion already  Good cosmesis on biceps firing  Nontender to palpation on collarbone joint area  Patient has positive periscapular trigger points upper and lower trapezius  Skin intact throughout  5/5 D B T G IO EPL  SILT Ax, R, U, M  +2 radial pulse     Diagnosis Orders   1. Orthopedic aftercare  meloxicam (MOBIC) 15 MG tablet      2. Trigger point of left shoulder region          Assessment and plan: The patient is now 6 weeks status post the above listed procedure and is recovering    .    --Greater than 50% of the time (11/20 minutes) was spent coordinating care and discussing postoperative recovery course  -I had a pleasant discussion with the patient today.  I did review with him that currently he is doing well from a functional recovery perspective.  I did review with him that he has good cosmesis on biceps firing.  He does have positive scar tissue at the above listed and I reviewed with him once again how to go about working on scar tissue massage  -I also reviewed with him he does have trigger points periscapular region and I reviewed with him how to go about working on trigger point management.  Formal physical therapy can also attend dry needling as needed  -Patient will remain in sling still through the

## 2024-11-27 ENCOUNTER — HOSPITAL ENCOUNTER (OUTPATIENT)
Dept: PHYSICAL THERAPY | Age: 69
Setting detail: THERAPIES SERIES
Discharge: HOME | End: 2024-11-27
Payer: MEDICARE

## 2024-11-27 PROCEDURE — 97016 VASOPNEUMATIC DEVICE THERAPY: CPT | Performed by: PHYSICAL THERAPIST

## 2024-11-27 NOTE — FLOWSHEET NOTE
[facility]- Outpatient Rehabilitation and Therapy  [address], [place] [address] office: [phone] fax: [phone]      Physical Therapy: TREATMENT/PROGRESS NOTE   Patient: Vinnie Driver (69 y.o. male)   Examination Date: 2024   :  1955 MRN: [id number]   Visit #: 8   Insurance Allowable Auth Needed   BMN []Yes    []No    Insurance: Payor: [facility] MEDICARE / Plan: [facility] MEDICARE ADVANTAGE / Product Type: *No Product type* /   Insurance ID: [id number] - (Medicare Managed)  Secondary Insurance (if applicable):    Treatment Diagnosis: Left shoulder pain M25.512; Left shoulder stiffness M25.612; MM weakness M62.81     Medical Diagnosis:  Orthopedic aftercare [Z47.89]   Referring Physician: Hansel Ventura PA-C  PCP: Antonino Zarate APRN - CNP     Plan of care signed (Y/N):     Date of Patient follow up with Physician:      Plan of Care Report: NO  POC update due: (10 visits /OR AUTH LIMITS, whichever is less)  2024                                             Medical History:  Comorbidities:  Prior Surgeries: Cervical surgery 2024 with disc replacement; B elbow surgery   Relevant Medical History:                                          Precautions/ Contra-indications:  Follow Massive RTC repair with Biceps Teno           Latex allergy:  NO  Pacemaker:    NO  Contraindications for Manipulation: None and recent surgical history (relative)  Date of Surgery: 10/16/24  Other:    Red Flags:  None    Suicide Screening:   The patient did not verbalize a primary behavioral concern, suicidal ideation, suicidal intent, or demonstrate suicidal behaviors.    Preferred Language for Healthcare:   [x] English       [] other:    SUBJECTIVE EXAMINATION     Patient stated complaint:  Patient saw MD yesterday.  Continue with sling until end of weekend and sleep in sling until .  2-5 lb weight limit.  Wants him to work on scar tissue and trigger points.  Patient reports he did

## 2024-12-02 ENCOUNTER — HOSPITAL ENCOUNTER (OUTPATIENT)
Dept: PHYSICAL THERAPY | Age: 69
Setting detail: THERAPIES SERIES
Discharge: HOME | End: 2024-12-02
Payer: MEDICARE

## 2024-12-02 PROCEDURE — 97140 MANUAL THERAPY 1/> REGIONS: CPT | Performed by: PHYSICAL THERAPIST

## 2024-12-02 PROCEDURE — 97110 THERAPEUTIC EXERCISES: CPT | Performed by: PHYSICAL THERAPIST

## 2024-12-02 NOTE — FLOWSHEET NOTE
has functional impairments and/or activity limitations and would benefit from continued outpatient therapy services to address the deficits outlined in the patients goals  The patient has a complexity identified by an ICD-10 code that has a direct and significant impact on the need for therapy.  (Significantly impacts the rate of recovery and is associated with a primary condition.)     Return to Play: Not Ready for Return to Sports    Prognosis for POC: [x] Good [] Fair  [] Poor    Patient requires continued skilled intervention: [x] Yes  [] No      CHARGE CAPTURE     PT CHARGE GRID   CPT Code (TIMED) minutes # CPT Code (UNTIMED) #     Therex (93612)  25 2  EVAL:LOW (03635 - Typically 20 minutes face-to-face)     Neuromusc. Re-ed (26414)    Re-Eval (53931)     Manual (86832) 17 1  Estim Unattended (80624)     Ther. Act (35173)    Mech. Traction (22304)     Gait (80203)    Dry Needle 1-2 muscle (21888)     Aquatic Therex (19163)    Dry Needle 3+ muscle (93439)     Iontophoresis (19351)    VASO (35688)     Ultrasound (70224)    Group Therapy (61054)     Estim Attended (61643)    Canalith Repositioning (29465)     Physical Performance Test (58189)    Custom orthotic ()     Other:    Other:c   Total Timed Code Tx Minutes 42 3       Total Treatment Minutes 42        Charge Justification:  (42177) THERAPEUTIC EXERCISE - Provided verbal/tactile cueing for activities related to strengthening, flexibility, endurance, ROM performed to prevent loss of range of motion, maintain or improve muscular strength or increase flexibility, following either an injury or surgery.   (01456) HOME EXERCISE PROGRAM - Reviewed/Progressed HEP activities related to strengthening, flexibility, endurance, ROM performed to prevent loss of range of motion, maintain or improve muscular strength or increase flexibility, following either an injury or surgery.  (79648) MANUAL THERAPY -  Manual therapy techniques, 1 or more regions, each 15 minutes

## 2024-12-05 ENCOUNTER — HOSPITAL ENCOUNTER (OUTPATIENT)
Dept: PHYSICAL THERAPY | Age: 69
Setting detail: THERAPIES SERIES
Discharge: HOME | End: 2024-12-05
Payer: MEDICARE

## 2024-12-05 PROCEDURE — 97140 MANUAL THERAPY 1/> REGIONS: CPT | Performed by: PHYSICAL THERAPIST

## 2024-12-05 PROCEDURE — 97110 THERAPEUTIC EXERCISES: CPT | Performed by: PHYSICAL THERAPIST

## 2024-12-05 NOTE — FLOWSHEET NOTE
Progression Towards Functional goals/ Treatment Progress Update:  [] Patient is progressing as expected towards functional goals listed.    [] Progression is slowed due to complexities/Impairments listed.  [] Progression has been slowed due to co-morbidities.  [x] Plan just implemented, too soon (<30days) to assess goals progression   [] Goals require adjustment due to lack of progress  [] Patient is not progressing as expected and requires additional follow up with physician  [] Other:     TREATMENT PLAN     Frequency/Duration: 1-2x/week for  16  weeks for the following treatment interventions:    Interventions:  Therapeutic Exercise (58155) including: strength training, ROM, and functional mobility  Therapeutic Activities (63870) including: functional mobility training and education.  Neuromuscular Re-education (36183) activation and proprioception, including postural re-education.    Manual Therapy (01601) as indicated to include: Passive Range of Motion, Gr I-IV mobilizations, and Soft Tissue Mobilization  Modalities as needed that may include: Cryotherapy, Electrical Stimulation, and Vasoneumatic Compression  Patient education on joint protection, postural re-education, activity modification, and progression of HEP    Plan: Cont POC- Continue emphasis/focus on exercise progression, improving proper muscle recruitment and activation/motor control patterns, modulating pain, and increasing ROM. Next visit plan to do POC      Electronically Signed by Adriana Aquino, BRICE  Date: 12/05/2024     Note: Portions of this note have been templated and/or copied from initial evaluation, reassessments and prior notes for documentation efficiency.    Note: If patient does not return for scheduled/recommended follow up visits, this note will serve as a discharge from care along with the most recent update on progress.    Ortho Evaluation

## 2024-12-09 ENCOUNTER — APPOINTMENT (OUTPATIENT)
Dept: PHYSICAL THERAPY | Age: 69
End: 2024-12-09
Payer: MEDICARE

## 2024-12-10 ENCOUNTER — HOSPITAL ENCOUNTER (OUTPATIENT)
Dept: PHYSICAL THERAPY | Age: 69
Setting detail: THERAPIES SERIES
Discharge: HOME | End: 2024-12-10
Payer: MEDICARE

## 2024-12-10 PROCEDURE — 97110 THERAPEUTIC EXERCISES: CPT | Performed by: PHYSICAL THERAPIST

## 2024-12-10 PROCEDURE — 97140 MANUAL THERAPY 1/> REGIONS: CPT | Performed by: PHYSICAL THERAPIST

## 2024-12-10 NOTE — FLOWSHEET NOTE
[facility]- Outpatient Rehabilitation and Therapy  [address]. [address], [place] [address] office: [phone] fax: [phone]      Physical Therapy: TREATMENT/PROGRESS NOTE   Patient: Vinnie Driver (69 y.o. male)   Examination Date: 12/10/2024   :  1955 MRN: [id number]   Visit #: 6   Insurance Allowable Auth Needed   BMN []Yes    []No    Insurance: Payor: [facility] MEDICARE / Plan: [facility] MEDICARE ADVANTAGE / Product Type: *No Product type* /   Insurance ID: [id number] - (Medicare Managed)  Secondary Insurance (if applicable):    Treatment Diagnosis: Left shoulder pain M25.512; Left shoulder stiffness M25.612; MM weakness M62.81  LEFT SHOULDER, ROTATOR CUFF REPAIR, SUBACROMIAL DECOMPRESSION, DISTAL CLAVICLE RESECTION, OPEN SUBPECTORAL BICEPS TENODESIS   SX 10/16/24   Medical Diagnosis:  Orthopedic aftercare [Z47.89]   Referring Physician: Hansel Ventura PA-C  PCP: Antonino Zarate APRN - CNP     Plan of care signed (Y/N):     Date of Patient follow up with Physician:      Plan of Care Report: NO  POC update due: (10 visits /OR AUTH LIMITS, whichever is less)  2024                                             Medical History:  Comorbidities:  Prior Surgeries: Cervical surgery 2024 with disc replacement; B elbow surgery   Relevant Medical History:                                          Precautions/ Contra-indications:  Follow Massive RTC repair with Biceps Teno           Latex allergy:  NO  Pacemaker:    NO  Contraindications for Manipulation: None and recent surgical history (relative)  Date of Surgery: 10/16/24  Other:    Red Flags:  None    Suicide Screening:   The patient did not verbalize a primary behavioral concern, suicidal ideation, suicidal intent, or demonstrate suicidal behaviors.    Preferred Language for Healthcare:   [x] English       [] other:    SUBJECTIVE EXAMINATION     Patient stated complaint:  8 weeks s/p.  Yesterday shoulder was good.  Has good days and bad

## 2024-12-12 ENCOUNTER — HOSPITAL ENCOUNTER (OUTPATIENT)
Dept: PHYSICAL THERAPY | Age: 69
Setting detail: THERAPIES SERIES
Discharge: HOME | End: 2024-12-12
Payer: MEDICARE

## 2024-12-12 PROCEDURE — 97110 THERAPEUTIC EXERCISES: CPT | Performed by: PHYSICAL THERAPIST

## 2024-12-12 PROCEDURE — 97140 MANUAL THERAPY 1/> REGIONS: CPT | Performed by: PHYSICAL THERAPIST

## 2024-12-12 NOTE — FLOWSHEET NOTE
activation/motor control patterns, modulating pain, and increasing ROM. Next visit plan to do POC      Electronically Signed by Adriana Aquino, PT  Date: 12/12/2024     Note: Portions of this note have been templated and/or copied from initial evaluation, reassessments and prior notes for documentation efficiency.    Note: If patient does not return for scheduled/recommended follow up visits, this note will serve as a discharge from care along with the most recent update on progress.    Ortho Evaluation

## 2024-12-16 ENCOUNTER — HOSPITAL ENCOUNTER (OUTPATIENT)
Dept: PHYSICAL THERAPY | Age: 69
Setting detail: THERAPIES SERIES
Discharge: HOME | End: 2024-12-16
Payer: MEDICARE

## 2024-12-16 PROCEDURE — 97110 THERAPEUTIC EXERCISES: CPT | Performed by: PHYSICAL THERAPIST

## 2024-12-16 PROCEDURE — 97140 MANUAL THERAPY 1/> REGIONS: CPT | Performed by: PHYSICAL THERAPIST

## 2024-12-16 NOTE — PLAN OF CARE
11/14/24  ROM/Strength:    Mvmt (norm) PROM L PROM R Notes       SHOULDER Flexion (180) Table slide 60 deg      Abduction (180)       ER -0 PROM - 10 deg      ER -90 (90)       IR -0       IR -90 (70)        ELBOW Flex/biceps (140) 120      Ext/triceps (0) - 15      Pronation (80) wnl      Supination (80) pronation         WRIST Flexion (60) wnl      Extension (60) wnl           MMT L MMT R Notes       SHOULDER Flexion Deferred secondary to surgical procedure      Abduction       ER -0       ER -90       IR -0       IR -90        ELBOW Flex/biceps       Ext/triceps       Pronation       supination                 Exercises/Interventions   Phase II:      Week 7    Discontinue sling   Maintain full range of motion in all planes   Advance PROM/AAROM in all directions as tolerated   Maintain full range of motion in all planes    Continue dynamic stabilization drills    Progressed AROM and light strengthening program with the addition of    ER/IR tubing    Lateral raises to 90° of abduction    Full can in scapular plane to 90° of elevation    Prone extension    Prone soleus punch    Elbow flexion and extension    *Must be able to elevate arm without shoulder were scapular hiking before initiating isotonic, if unable, continue dynamic rhythm stabilization glenohumeral joint exercises    Progress joint mobilizations to grades 3 and 4 to address capsular restrictions as indicated for all shoulder girdle joints      Weeks 8-9    Continue as above    Initiate light functional activities if the physician permits    In pain-free ROM, starting at waist level activities, progression to shoulder level activities, and overhead activities      Week 10    Continue with all exercises listed above    Progress the fundamental shoulder exercises    Strengthening exercises: Addition of the following    Standing lateral raise to 90°    Prone horizontal abduction-Ts'    Prone scaption - Ys    Initiate isotonic resistance (0.5kg

## 2024-12-19 ENCOUNTER — HOSPITAL ENCOUNTER (OUTPATIENT)
Dept: PHYSICAL THERAPY | Age: 69
Setting detail: THERAPIES SERIES
Discharge: HOME | End: 2024-12-19
Payer: MEDICARE

## 2024-12-19 PROCEDURE — 97110 THERAPEUTIC EXERCISES: CPT | Performed by: PHYSICAL THERAPIST

## 2024-12-19 PROCEDURE — 97140 MANUAL THERAPY 1/> REGIONS: CPT | Performed by: PHYSICAL THERAPIST

## 2024-12-19 NOTE — FLOWSHEET NOTE
prevent loss of range of motion, maintain or improve muscular strength or increase flexibility, following either an injury or surgery.   (19573) HOME EXERCISE PROGRAM - Reviewed/Progressed HEP activities related to strengthening, flexibility, endurance, ROM performed to prevent loss of range of motion, maintain or improve muscular strength or increase flexibility, following either an injury or surgery.  (01756) MANUAL THERAPY -  Manual therapy techniques, 1 or more regions, each 15 minutes (Mobilization/manipulation, manual lymphatic drainage, manual traction) for the purpose of modulating pain, promoting relaxation,  increasing ROM, reducing/eliminating soft tissue swelling/inflammation/restriction, improving soft tissue extensibility and allowing for proper ROM for normal function with self care, mobility, lifting and ambulation    GOALS     Patient stated goal: Return to PLOF  [] Progressing: [] Met: [] Not Met: [] Adjusted    Therapist goals for Patient:   Short Term Goals: To be achieved in: 2 weeks  1. Independent in HEP and progression per patient tolerance, in order to prevent re-injury.   [] Progressing: [x] Met: [] Not Met: [] Adjusted  2. Patient will have a decrease in pain to <2-3/10 to facilitate improvement in movement, function, and ADLs as indicated by Functional Deficits.  [] Progressing: [] Met: [x] Not Met: [] Adjusted      Long Term Goals: To be achieved in: 16 weeks  1. Disability index score of 30% or less for the Quick DASH to assist with reaching prior level of function with activities such as overhead activities.  [x] Progressing: [] Met: [] Not Met: [] Adjusted  2. Patient will demonstrate increased AROM of shoulder to 150 deg elevation and 75 deg PROM ER without pain to allow for proper joint functioning to enable patient to reach overhead.   [x] Progressing: [] Met: [] Not Met: [] Adjusted  3. Patient will demonstrate increased Strength of shoulder to at least 4/5 throughout without pain

## 2024-12-27 ENCOUNTER — HOSPITAL ENCOUNTER (OUTPATIENT)
Dept: PHYSICAL THERAPY | Age: 69
Setting detail: THERAPIES SERIES
Discharge: HOME | End: 2024-12-27
Payer: MEDICARE

## 2024-12-27 PROCEDURE — 97110 THERAPEUTIC EXERCISES: CPT | Performed by: PHYSICAL THERAPIST

## 2024-12-27 PROCEDURE — 97140 MANUAL THERAPY 1/> REGIONS: CPT | Performed by: PHYSICAL THERAPIST

## 2024-12-27 NOTE — FLOWSHEET NOTE
[facility]- Outpatient Rehabilitation and Therapy  [address]. [address], [place] [address] office: [phone] fax: [phone]      Physical Therapy: TREATMENT/PROGRESS NOTE   Patient: Vinnie Driver (69 y.o. male)   Examination Date: 2024   :  1955 MRN: [id number]   Visit #: 10   Insurance Allowable Auth Needed   24 until 24 []Yes    []No    Insurance: Payor: [facility] MEDICARE / Plan: [facility] MEDICARE ADVANTAGE / Product Type: *No Product type* /   Insurance ID: [id number] - (Medicare Managed)  Secondary Insurance (if applicable):    Treatment Diagnosis: Left shoulder pain M25.512; Left shoulder stiffness M25.612; MM weakness M62.81  LEFT SHOULDER, ROTATOR CUFF REPAIR, SUBACROMIAL DECOMPRESSION, DISTAL CLAVICLE RESECTION, OPEN SUBPECTORAL BICEPS TENODESIS   SX 10/16/24   Medical Diagnosis:  Orthopedic aftercare [Z47.89]   Referring Physician: Hansel Ventura PA-C  PCP: Antoinno Zarate APRN - CNP     Plan of care signed (Y/N):     Date of Patient follow up with Physician:      Plan of Care Report: NO  POC update due: (10 visits /OR AUTH LIMITS, whichever is less)  2024                                             Medical History:  Comorbidities:  Prior Surgeries: Cervical surgery 2024 with disc replacement; B elbow surgery   Relevant Medical History:                                          Precautions/ Contra-indications:  Follow Massive RTC repair with Biceps Teno           Latex allergy:  NO  Pacemaker:    NO  Contraindications for Manipulation: None and recent surgical history (relative)  Date of Surgery: 10/16/24  Other:    Red Flags:  None    Suicide Screening:   The patient did not verbalize a primary behavioral concern, suicidal ideation, suicidal intent, or demonstrate suicidal behaviors.    Preferred Language for Healthcare:   [x] English       [] other:    SUBJECTIVE EXAMINATION     Patient stated complaint:  10 weeks s/p.  Feels better when in PT and getting

## 2024-12-30 ENCOUNTER — HOSPITAL ENCOUNTER (OUTPATIENT)
Dept: PHYSICAL THERAPY | Age: 69
Setting detail: THERAPIES SERIES
Discharge: HOME | End: 2024-12-30
Payer: MEDICARE

## 2024-12-30 PROCEDURE — 97140 MANUAL THERAPY 1/> REGIONS: CPT | Performed by: PHYSICAL THERAPIST

## 2024-12-30 PROCEDURE — 97110 THERAPEUTIC EXERCISES: CPT | Performed by: PHYSICAL THERAPIST

## 2024-12-30 NOTE — FLOWSHEET NOTE
reached. Continues to display deficits in tightness, stiffness, and weakness which required ongoing skilled physical therapy and decision making.  Improved tolerance to ER strengthening.  Able to add weight to side lying exercises.     Medical Necessity Documentation:  I certify that this patient meets the below criteria necessary for medical necessity for care and/or justification of therapy services:  The patient has functional impairments and/or activity limitations and would benefit from continued outpatient therapy services to address the deficits outlined in the patients goals  The patient has a complexity identified by an ICD-10 code that has a direct and significant impact on the need for therapy.  (Significantly impacts the rate of recovery and is associated with a primary condition.)     Return to Play: Not Ready for Return to Sports    Prognosis for POC: [x] Good [] Fair  [] Poor    Patient requires continued skilled intervention: [x] Yes  [] No      CHARGE CAPTURE     PT CHARGE GRID   CPT Code (TIMED) minutes # CPT Code (UNTIMED) #     Therex (81645)  23 2  EVAL:LOW (25977 - Typically 20 minutes face-to-face)     Neuromusc. Re-ed (16078)    Re-Eval (55886)     Manual (45686) 18 1  Estim Unattended (27217)     Ther. Act (04400)    Mech. Traction (43814)     Gait (49065)    Dry Needle 1-2 muscle (05972)     Aquatic Therex (68450)    Dry Needle 3+ muscle (20561)     Iontophoresis (24213)    VASO (98142)     Ultrasound (61284)    Group Therapy (63897)     Estim Attended (42444)    Canalith Repositioning (82970)     Physical Performance Test (89229)    Custom orthotic ()     Other:    Other:cold pack x 10 min    Total Timed Code Tx Minutes 41 3       Total Treatment Minutes 41        Charge Justification:  (03857) THERAPEUTIC EXERCISE - Provided verbal/tactile cueing for activities related to strengthening, flexibility, endurance, ROM performed to prevent loss of range of motion, maintain or improve

## 2025-01-02 ENCOUNTER — APPOINTMENT (OUTPATIENT)
Dept: PHYSICAL THERAPY | Age: 70
End: 2025-01-02
Payer: MEDICARE

## 2025-01-03 ENCOUNTER — HOSPITAL ENCOUNTER (OUTPATIENT)
Dept: PHYSICAL THERAPY | Age: 70
Setting detail: THERAPIES SERIES
Discharge: HOME | End: 2025-01-03
Payer: MEDICARE

## 2025-01-03 PROCEDURE — 97140 MANUAL THERAPY 1/> REGIONS: CPT | Performed by: PHYSICAL THERAPIST

## 2025-01-03 PROCEDURE — 97110 THERAPEUTIC EXERCISES: CPT | Performed by: PHYSICAL THERAPIST

## 2025-01-03 NOTE — FLOWSHEET NOTE
[facility]- Outpatient Rehabilitation and Therapy  [address], [place] [address] office: [phone] fax: [phone]      Physical Therapy: TREATMENT/PROGRESS NOTE   Patient: Vinnie Driver (69 y.o. male)   Examination Date: 2025   :  1955 MRN: [id number]   Visit #: 14*  Insurance Allowable Auth Needed   24 until 24 []Yes    []No    Insurance: Payor: [facility] MEDICARE / Plan: [facility] MEDICARE ADVANTAGE / Product Type: *No Product type* /   Insurance ID: [id number] - (Medicare Managed)  Secondary Insurance (if applicable):    Treatment Diagnosis: Left shoulder pain M25.512; Left shoulder stiffness M25.612; MM weakness M62.81  LEFT SHOULDER, ROTATOR CUFF REPAIR, SUBACROMIAL DECOMPRESSION, DISTAL CLAVICLE RESECTION, OPEN SUBPECTORAL BICEPS TENODESIS   SX 10/16/24   Medical Diagnosis:  Orthopedic aftercare [Z47.89]   Referring Physician: Hansel Ventura PA-C  PCP: Antonino Zarate APRN - CNP     Plan of care signed (Y/N):     Date of Patient follow up with Physician:      Plan of Care Report: NO  POC update due: (10 visits /OR AUTH LIMITS, whichever is less)  2025                                             Medical History:  Comorbidities:  Prior Surgeries: Cervical surgery 2024 with disc replacement; B elbow surgery   Relevant Medical History:                                          Precautions/ Contra-indications:  Follow Massive RTC repair with Biceps Teno           Latex allergy:  NO  Pacemaker:    NO  Contraindications for Manipulation: None and recent surgical history (relative)  Date of Surgery: 10/16/24  Other:    Red Flags:  None    Suicide Screening:   The patient did not verbalize a primary behavioral concern, suicidal ideation, suicidal intent, or demonstrate suicidal behaviors.    Preferred Language for Healthcare:   [x] English       [] other:    SUBJECTIVE EXAMINATION     Patient stated complaint:  11 weeks s/p.  Shoulder has been stiff yesterday and

## 2025-01-06 ENCOUNTER — HOSPITAL ENCOUNTER (OUTPATIENT)
Dept: PHYSICAL THERAPY | Age: 70
Setting detail: THERAPIES SERIES
End: 2025-01-06
Payer: MEDICARE

## 2025-01-09 ENCOUNTER — HOSPITAL ENCOUNTER (OUTPATIENT)
Dept: PHYSICAL THERAPY | Age: 70
Setting detail: THERAPIES SERIES
Discharge: HOME | End: 2025-01-09
Payer: MEDICARE

## 2025-01-09 PROCEDURE — 97140 MANUAL THERAPY 1/> REGIONS: CPT | Performed by: PHYSICAL THERAPIST

## 2025-01-09 PROCEDURE — 97110 THERAPEUTIC EXERCISES: CPT | Performed by: PHYSICAL THERAPIST

## 2025-01-13 ENCOUNTER — HOSPITAL ENCOUNTER (OUTPATIENT)
Dept: PHYSICAL THERAPY | Age: 70
Setting detail: THERAPIES SERIES
Discharge: HOME | End: 2025-01-13
Payer: MEDICARE

## 2025-01-13 PROCEDURE — 97110 THERAPEUTIC EXERCISES: CPT | Performed by: PHYSICAL THERAPIST

## 2025-01-13 PROCEDURE — 97140 MANUAL THERAPY 1/> REGIONS: CPT | Performed by: PHYSICAL THERAPIST

## 2025-01-13 NOTE — FLOWSHEET NOTE
allow for proper joint functioning to enable patient to reach overhead.   [x] Progressing: [] Met: [] Not Met: [] Adjusted  3. Patient will demonstrate increased Strength of shoulder to at least 4/5 throughout without pain to allow for proper functional mobility to enable patient to return to lifting.   [x] Progressing: [] Met: [] Not Met: [] Adjusted  4. Patient will return to putting objects into cabinets and refrigerator without increased symptoms or restriction.   [x] Progressing: [] Met: [] Not Met: [] Adjusted  5. Patient will return to modified gym workouts keeping in safe shoulder zone.    [] Progressing: [] Met: [x] Not Met: [] Adjusted     Overall Progression Towards Functional goals/ Treatment Progress Update:  [x] Patient is progressing as expected towards functional goals listed.    [] Progression is slowed due to complexities/Impairments listed.  [] Progression has been slowed due to co-morbidities.  [] Plan just implemented, too soon (<30days) to assess goals progression   [] Goals require adjustment due to lack of progress  [] Patient is not progressing as expected and requires additional follow up with physician  [] Other:     TREATMENT PLAN     Frequency/Duration: 1-2x/week for  16  weeks for the following treatment interventions:    Interventions:  Therapeutic Exercise (39176) including: strength training, ROM, and functional mobility  Therapeutic Activities (67470) including: functional mobility training and education.  Neuromuscular Re-education (59639) activation and proprioception, including postural re-education.    Manual Therapy (50167) as indicated to include: Passive Range of Motion, Gr I-IV mobilizations, and Soft Tissue Mobilization  Modalities as needed that may include: Cryotherapy, Electrical Stimulation, and Vasoneumatic Compression  Patient education on joint protection, postural re-education, activity modification, and progression of HEP    Plan: Cont POC- Continue emphasis/focus

## 2025-01-15 ENCOUNTER — OFFICE VISIT (OUTPATIENT)
Dept: INTERNAL MEDICINE CLINIC | Age: 70
End: 2025-01-15
Payer: MEDICARE

## 2025-01-15 VITALS
BODY MASS INDEX: 31.04 KG/M2 | WEIGHT: 216.8 LBS | TEMPERATURE: 97.7 F | DIASTOLIC BLOOD PRESSURE: 78 MMHG | HEIGHT: 70 IN | SYSTOLIC BLOOD PRESSURE: 134 MMHG | HEART RATE: 122 BPM | OXYGEN SATURATION: 94 %

## 2025-01-15 DIAGNOSIS — R21 MACULOPAPULAR RASH: Primary | ICD-10-CM

## 2025-01-15 DIAGNOSIS — G70.00 MG (MYASTHENIA GRAVIS) (HCC): ICD-10-CM

## 2025-01-15 PROCEDURE — 99213 OFFICE O/P EST LOW 20 MIN: CPT | Performed by: NURSE PRACTITIONER

## 2025-01-15 PROCEDURE — 1123F ACP DISCUSS/DSCN MKR DOCD: CPT | Performed by: NURSE PRACTITIONER

## 2025-01-15 PROCEDURE — 1159F MED LIST DOCD IN RCRD: CPT | Performed by: NURSE PRACTITIONER

## 2025-01-15 RX ORDER — SILDENAFIL 50 MG/1
50 TABLET, FILM COATED ORAL DAILY PRN
Qty: 5 TABLET | Refills: 0 | Status: SHIPPED | OUTPATIENT
Start: 2025-01-15

## 2025-01-15 RX ORDER — CLOBETASOL PROPIONATE 0.5 MG/G
OINTMENT TOPICAL
Qty: 60 G | Refills: 0 | Status: SHIPPED | OUTPATIENT
Start: 2025-01-15

## 2025-01-15 SDOH — ECONOMIC STABILITY: FOOD INSECURITY: WITHIN THE PAST 12 MONTHS, THE FOOD YOU BOUGHT JUST DIDN'T LAST AND YOU DIDN'T HAVE MONEY TO GET MORE.: NEVER TRUE

## 2025-01-15 SDOH — ECONOMIC STABILITY: FOOD INSECURITY: WITHIN THE PAST 12 MONTHS, YOU WORRIED THAT YOUR FOOD WOULD RUN OUT BEFORE YOU GOT MONEY TO BUY MORE.: NEVER TRUE

## 2025-01-15 ASSESSMENT — ENCOUNTER SYMPTOMS
NAUSEA: 0
SINUS PAIN: 0
COUGH: 0
VOMITING: 0
SHORTNESS OF BREATH: 0
CONSTIPATION: 0
CHEST TIGHTNESS: 0
DIARRHEA: 0
SORE THROAT: 0
COLOR CHANGE: 0

## 2025-01-15 ASSESSMENT — PATIENT HEALTH QUESTIONNAIRE - PHQ9
SUM OF ALL RESPONSES TO PHQ QUESTIONS 1-9: 0
SUM OF ALL RESPONSES TO PHQ9 QUESTIONS 1 & 2: 0
SUM OF ALL RESPONSES TO PHQ QUESTIONS 1-9: 0
SUM OF ALL RESPONSES TO PHQ QUESTIONS 1-9: 0
1. LITTLE INTEREST OR PLEASURE IN DOING THINGS: NOT AT ALL
SUM OF ALL RESPONSES TO PHQ QUESTIONS 1-9: 0
2. FEELING DOWN, DEPRESSED OR HOPELESS: NOT AT ALL

## 2025-01-15 NOTE — PROGRESS NOTES
sildenafil (VIAGRA) 50 MG tablet; Take 1 tablet by mouth daily as needed for Erectile Dysfunction        Return if symptoms worsen or fail to improve.      Patientshould call the office immediately with new or ongoing signs or symptoms or worsening, or proceed to the emergency room.  If you are on medications which could impair your senses, you are at risk of weakness, falls,dizziness, or drowsiness.  You should be careful during activities which could place you at risk of harm, such as climbing, using stairs, operating machinery, or driving vehicles.  If you feel you cannot safely do theseactivities, you should request others to help you, or avoid the activities altogether. If you are drowsy for any other reason, you should use the same precautions as listed above.    Call if pattern of symptoms change or persists for an extended time.      Antonino Zraate,TAMIP-C

## 2025-01-16 ENCOUNTER — HOSPITAL ENCOUNTER (OUTPATIENT)
Dept: PHYSICAL THERAPY | Age: 70
Setting detail: THERAPIES SERIES
Discharge: HOME | End: 2025-01-16
Payer: MEDICARE

## 2025-01-16 ENCOUNTER — OFFICE VISIT (OUTPATIENT)
Dept: ORTHOPEDIC SURGERY | Age: 70
End: 2025-01-16

## 2025-01-16 VITALS — WEIGHT: 216 LBS | HEIGHT: 70 IN | BODY MASS INDEX: 30.92 KG/M2

## 2025-01-16 DIAGNOSIS — Z47.89 ORTHOPEDIC AFTERCARE: Primary | ICD-10-CM

## 2025-01-16 PROCEDURE — 97110 THERAPEUTIC EXERCISES: CPT | Performed by: PHYSICAL THERAPIST

## 2025-01-16 PROCEDURE — 99024 POSTOP FOLLOW-UP VISIT: CPT | Performed by: ORTHOPAEDIC SURGERY

## 2025-01-16 PROCEDURE — 97140 MANUAL THERAPY 1/> REGIONS: CPT | Performed by: PHYSICAL THERAPIST

## 2025-01-16 NOTE — PROGRESS NOTES
POST OPERATIVE ORTHOPAEDIC NOTE    DOS: 10/16/2024  PROCEDURES: Left shoulder arthroscopic massive rotator cuff repair, subacromial decompression and distal clavicle resection and open subpectoral biceps tenodesis    The patient has been recovering. The patient states the pain is 0-1/10 pain on intake form.  The patient has continued PT. he is pleased with the results thus far and is continue to improve his range of motion.  Degrees forward flexion    Focused pertinent physical examination of the operative extremity:  Wounds C/D/I, well healed surgical incisions  170 degrees forward flexion abduction, 30 degrees external Tatian, internal Tatian L5  5/5 supraspinatus infraspinatus and subscapularis  Good cosmesis on biceps firing  Tender to palpation collarbone joint area  Skin intact throughout  5/5 D B T G IO EPL  SILT Ax, R, U, M  +2 radial pulse     Diagnosis Orders   1. Orthopedic aftercare          Assessment and plan: The patient is now 3 months status post the above listed procedure and is recovering    .    --Greater than 50% of the time (11/20 minutes) was spent coordinating care and discussing postoperative recovery course  -I had a pleasant discussion with the patient today.  I reviewed with him that currently his examination is quite well-appearing and he is pleased with the results of the procedures thus far as am I.  He has good strength on his rotator cuff testing with functional range of motion returning.  -Formal physical therapy will continue 1-2 times a week with continued reconditioning of the shoulder and following massive rotator cuff repair protocol with biceps tenodesis protocol  -OTC Tylenol/Aleve per bottle as needed discomfort  -Activity modifications and he was instructed on how not to go about overdoing it  -All questions answered to the patient's satisfaction and the patient expressed understanding and agreement with the above listed treatment plan  -Follow up in 3 months for 6 month 
[employer]

## 2025-01-16 NOTE — FLOWSHEET NOTE
interventions for exercise progression, improving proper muscle recruitment and activation/motor control patterns, and increasing ROM.Patient will continue to benefit from ongoing evaluation and advanced clinical decision from a Physical Therapist to address and improve ROM, muscle strength, functional mobility, and ADL status to safely return to PLOF without symptoms or restrictions. Tightness present this date.  Pain with end range mobility.  Slow progression with IR.  Fatigued greatly with wall strengthening exercises.  Continue to make slow small progressions as patient tolerates.     Medical Necessity Documentation:  I certify that this patient meets the below criteria necessary for medical necessity for care and/or justification of therapy services:  The patient has functional impairments and/or activity limitations and would benefit from continued outpatient therapy services to address the deficits outlined in the patients goals  The patient has a complexity identified by an ICD-10 code that has a direct and significant impact on the need for therapy.  (Significantly impacts the rate of recovery and is associated with a primary condition.)     Return to Play: Not Ready for Return to Sports    Prognosis for POC: [x] Good [] Fair  [] Poor    Patient requires continued skilled intervention: [x] Yes  [] No      CHARGE CAPTURE     PT CHARGE GRID   CPT Code (TIMED) minutes # CPT Code (UNTIMED) #     Therex (22268)  22 2  EVAL:LOW (87805 - Typically 20 minutes face-to-face)     Neuromusc. Re-ed (03809)    Re-Eval (98947)     Manual (76441) 20 1  Estim Unattended (12507)     Ther. Act (42037)    [facility]. Traction (43972)     Gait (62531)    Dry Needle 1-2 muscle (20560)     Aquatic Therex (62812)    Dry Needle 3+ muscle (20561)     Iontophoresis (94731)    VASO (38775)     Ultrasound (25936)    Group Therapy (37706)     Estim Attended (63962)    Canalith Repositioning (72960)     Physical Performance Test (49467)

## 2025-01-20 ENCOUNTER — HOSPITAL ENCOUNTER (OUTPATIENT)
Dept: PHYSICAL THERAPY | Age: 70
Setting detail: THERAPIES SERIES
Discharge: HOME | End: 2025-01-20
Payer: MEDICARE

## 2025-01-20 PROCEDURE — 97140 MANUAL THERAPY 1/> REGIONS: CPT | Performed by: PHYSICAL THERAPIST

## 2025-01-20 PROCEDURE — 97110 THERAPEUTIC EXERCISES: CPT | Performed by: PHYSICAL THERAPIST

## 2025-01-20 NOTE — FLOWSHEET NOTE
[facility]- Outpatient Rehabilitation and Therapy  [address], [place] [address] office: [phone] fax: [phone]      Physical Therapy: TREATMENT/PROGRESS NOTE   Patient: Vinnie Driver (70 y.o. male)   Examination Date: 2025   :  1955 MRN: [id number]   Visit #: 17*  3/8 visits  Insurance Allowable Auth Needed   24 until 25; 8 visits until 25 [x]Yes    []No    Insurance: Payor: [facility] MEDICARE / Plan: [facility] MEDICARE ADVANTAGE / Product Type: *No Product type* /   Insurance ID: [id number] - (Medicare Managed)  Secondary Insurance (if applicable):    Treatment Diagnosis: Left shoulder pain M25.512; Left shoulder stiffness M25.612; MM weakness M62.81  LEFT SHOULDER, ROTATOR CUFF REPAIR, SUBACROMIAL DECOMPRESSION, DISTAL CLAVICLE RESECTION, OPEN SUBPECTORAL BICEPS TENODESIS   SX 10/16/24   Medical Diagnosis:  Orthopedic aftercare [Z47.89]   Referring Physician: Hansel Ventura PA-C  PCP: Antonino Zarate APRN - CNP     Plan of care signed (Y/N):     Date of Patient follow up with Physician:      Plan of Care Report: NO  POC update due: (10 visits /OR AUTH LIMITS, whichever is less)  2025                                             Medical History:  Comorbidities:  Prior Surgeries: Cervical surgery 2024 with disc replacement; B elbow surgery   Relevant Medical History:                                          Precautions/ Contra-indications:  Follow Massive RTC repair with Biceps Teno           Latex allergy:  NO  Pacemaker:    NO  Contraindications for Manipulation: None and recent surgical history (relative)  Date of Surgery: 10/16/24  Other:    Red Flags:  None    Suicide Screening:   The patient did not verbalize a primary behavioral concern, suicidal ideation, suicidal intent, or demonstrate suicidal behaviors.    Preferred Language for Healthcare:   [x] English       [] other:    SUBJECTIVE EXAMINATION     Patient stated complaint:  Fatigued with

## 2025-01-23 ENCOUNTER — HOSPITAL ENCOUNTER (OUTPATIENT)
Dept: PHYSICAL THERAPY | Age: 70
Setting detail: THERAPIES SERIES
Discharge: HOME | End: 2025-01-23
Payer: MEDICARE

## 2025-01-23 PROCEDURE — 97110 THERAPEUTIC EXERCISES: CPT | Performed by: PHYSICAL THERAPIST

## 2025-01-23 PROCEDURE — 97140 MANUAL THERAPY 1/> REGIONS: CPT | Performed by: PHYSICAL THERAPIST

## 2025-01-23 NOTE — FLOWSHEET NOTE
Treatment Minutes 38        Charge Justification:  (32017) THERAPEUTIC EXERCISE - Provided verbal/tactile cueing for activities related to strengthening, flexibility, endurance, ROM performed to prevent loss of range of motion, maintain or improve muscular strength or increase flexibility, following either an injury or surgery.   (64129) HOME EXERCISE PROGRAM - Reviewed/Progressed HEP activities related to strengthening, flexibility, endurance, ROM performed to prevent loss of range of motion, maintain or improve muscular strength or increase flexibility, following either an injury or surgery.  (56907) MANUAL THERAPY -  Manual therapy techniques, 1 or more regions, each 15 minutes (Mobilization/manipulation, manual lymphatic drainage, manual traction) for the purpose of modulating pain, promoting relaxation,  increasing ROM, reducing/eliminating soft tissue swelling/inflammation/restriction, improving soft tissue extensibility and allowing for proper ROM for normal function with self care, mobility, lifting and ambulation    GOALS     Patient stated goal: Return to PLOF  [] Progressing: [] Met: [] Not Met: [] Adjusted    Therapist goals for Patient:   Short Term Goals: To be achieved in: 2 weeks  1. Independent in HEP and progression per patient tolerance, in order to prevent re-injury.   [] Progressing: [x] Met: [] Not Met: [] Adjusted  2. Patient will have a decrease in pain to <2-3/10 to facilitate improvement in movement, function, and ADLs as indicated by Functional Deficits.  [] Progressing: [] Met: [x] Not Met: [] Adjusted      Long Term Goals: To be achieved in: 16 weeks  1. Disability index score of 30% or less for the Quick DASH to assist with reaching prior level of function with activities such as overhead activities.  [x] Progressing: [] Met: [] Not Met: [] Adjusted  2. Patient will demonstrate increased AROM of shoulder to 150 deg elevation and 75 deg PROM ER without pain to allow for proper joint

## 2025-01-27 ENCOUNTER — HOSPITAL ENCOUNTER (OUTPATIENT)
Dept: PHYSICAL THERAPY | Age: 70
Setting detail: THERAPIES SERIES
Discharge: HOME | End: 2025-01-27
Payer: MEDICARE

## 2025-01-27 PROCEDURE — 97140 MANUAL THERAPY 1/> REGIONS: CPT | Performed by: PHYSICAL THERAPIST

## 2025-01-27 PROCEDURE — 97110 THERAPEUTIC EXERCISES: CPT | Performed by: PHYSICAL THERAPIST

## 2025-01-27 NOTE — FLOWSHEET NOTE
[facility]- Outpatient Rehabilitation and Therapy  [address], [place] [address] office: [phone] fax: [phone]      Physical Therapy: TREATMENT/PROGRESS NOTE   Patient: Vinnie Driver (70 y.o. male)   Examination Date: 2025   :  1955 MRN: [id number]   Visit #: 19*  5/8 visits  Insurance Allowable Auth Needed   24 until 25; 8 visits until 25 [x]Yes    []No    Insurance: Payor: [facility] MEDICARE / Plan: [facility] MEDICARE ADVANTAGE / Product Type: *No Product type* /   Insurance ID: [id number] - (Medicare Managed)  Secondary Insurance (if applicable):    Treatment Diagnosis: Left shoulder pain M25.512; Left shoulder stiffness M25.612; MM weakness M62.81  LEFT SHOULDER, ROTATOR CUFF REPAIR, SUBACROMIAL DECOMPRESSION, DISTAL CLAVICLE RESECTION, OPEN SUBPECTORAL BICEPS TENODESIS   SX 10/16/24   Medical Diagnosis:  Orthopedic aftercare [Z47.89]   Referring Physician: Hansel Ventura PA-C  PCP: Antonino Zarate APRN - CNP     Plan of care signed (Y/N):     Date of Patient follow up with Physician:      Plan of Care Report: NO  POC update due: (10 visits /OR AUTH LIMITS, whichever is less)  2025                                             Medical History:  Comorbidities:  Prior Surgeries: Cervical surgery 2024 with disc replacement; B elbow surgery   Relevant Medical History:                                          Precautions/ Contra-indications:  Follow Massive RTC repair with Biceps Teno           Latex allergy:  NO  Pacemaker:    NO  Contraindications for Manipulation: None and recent surgical history (relative)  Date of Surgery: 10/16/24  Other:    Red Flags:  None    Suicide Screening:   The patient did not verbalize a primary behavioral concern, suicidal ideation, suicidal intent, or demonstrate suicidal behaviors.    Preferred Language for Healthcare:   [x] English       [] other:    SUBJECTIVE EXAMINATION     Patient stated complaint:  Working hard on

## 2025-01-30 ENCOUNTER — HOSPITAL ENCOUNTER (OUTPATIENT)
Dept: PHYSICAL THERAPY | Age: 70
Setting detail: THERAPIES SERIES
Discharge: HOME | End: 2025-01-30
Payer: MEDICARE

## 2025-01-30 PROCEDURE — 97140 MANUAL THERAPY 1/> REGIONS: CPT | Performed by: PHYSICAL THERAPIST

## 2025-01-30 PROCEDURE — 97110 THERAPEUTIC EXERCISES: CPT | Performed by: PHYSICAL THERAPIST

## 2025-01-30 NOTE — FLOWSHEET NOTE
[facility]- Outpatient Rehabilitation and Therapy  [address], [place] [address] office: [phone] fax: [phone]      Physical Therapy: TREATMENT/PROGRESS NOTE   Patient: Vinnie Driver (70 y.o. male)   Examination Date: 2025   :  1955 MRN: [id number]   Visit #: 21*  6/8 visits  Insurance Allowable Auth Needed   24 until 25; 8 visits until 25 [x]Yes    []No    Insurance: Payor: [facility] MEDICARE / Plan: [facility] MEDICARE ADVANTAGE / Product Type: *No Product type* /   Insurance ID: [id number] - (Medicare Managed)  Secondary Insurance (if applicable):    Treatment Diagnosis: Left shoulder pain M25.512; Left shoulder stiffness M25.612; MM weakness M62.81  LEFT SHOULDER, ROTATOR CUFF REPAIR, SUBACROMIAL DECOMPRESSION, DISTAL CLAVICLE RESECTION, OPEN SUBPECTORAL BICEPS TENODESIS   SX 10/16/24   Medical Diagnosis:  Orthopedic aftercare [Z47.89]   Referring Physician: Hansel Ventura PA-C  PCP: Antonino Zarate APRN - CNP     Plan of care signed (Y/N):     Date of Patient follow up with Physician:      Plan of Care Report: NO  POC update due: (10 visits /OR AUTH LIMITS, whichever is less)  2025                                             Medical History:  Comorbidities:  Prior Surgeries: Cervical surgery 2024 with disc replacement; B elbow surgery   Relevant Medical History:                                          Precautions/ Contra-indications:  Follow Massive RTC repair with Biceps Teno           Latex allergy:  NO  Pacemaker:    NO  Contraindications for Manipulation: None and recent surgical history (relative)  Date of Surgery: 10/16/24  Other:    Red Flags:  None    Suicide Screening:   The patient did not verbalize a primary behavioral concern, suicidal ideation, suicidal intent, or demonstrate suicidal behaviors.    Preferred Language for Healthcare:   [x] English       [] other:    SUBJECTIVE EXAMINATION     Patient stated complaint:  Feels functioning

## 2025-01-31 ENCOUNTER — TELEPHONE (OUTPATIENT)
Dept: INTERNAL MEDICINE CLINIC | Age: 70
End: 2025-01-31

## 2025-01-31 DIAGNOSIS — M54.2 NECK PAIN: ICD-10-CM

## 2025-01-31 RX ORDER — METHYLPREDNISOLONE 4 MG/1
TABLET ORAL
Qty: 1 KIT | Refills: 0 | Status: SHIPPED | OUTPATIENT
Start: 2025-01-31 | End: 2025-02-06

## 2025-01-31 NOTE — TELEPHONE ENCOUNTER
Patient called stating when he has back issues you give him a steroid pack and it helps really good. He stated he would like to see if you could send him another steroid pack to his pharmacy [facility]. Call back number [phone]

## 2025-02-03 ENCOUNTER — HOSPITAL ENCOUNTER (OUTPATIENT)
Dept: PHYSICAL THERAPY | Age: 70
Setting detail: THERAPIES SERIES
Discharge: HOME | End: 2025-02-03
Payer: MEDICARE

## 2025-02-03 PROCEDURE — 97110 THERAPEUTIC EXERCISES: CPT | Performed by: PHYSICAL THERAPIST

## 2025-02-03 PROCEDURE — 97140 MANUAL THERAPY 1/> REGIONS: CPT | Performed by: PHYSICAL THERAPIST

## 2025-02-03 NOTE — PLAN OF CARE
[facility]- Outpatient Rehabilitation and Therapy  [address], [place] [address] office: [phone] fax: [phone]    Physical Therapy Re-Certification Plan of Care    Dear Ct,     We had the pleasure of treating the following patient for physical therapy services at [facility] Outpatient Physical Therapy. A summary of our findings can be found in the updated assessment below.  This includes our plan of care.  If you have any questions or concerns regarding these findings, please do not hesitate to contact me at the office phone number checked above.  Thank you for the referral.     Physician Signature:________________________________Date:__________________  By signing above (or electronic signature), therapist's plan is approved by physician      Total Visits: 18     Overall Response to Treatment:  Patient is responding well to treatment and improvement is noted with regards to goals    Recommendation:    [x] Continue PT 2x / wk for 6 weeks.   [] Hold PT, pending MD visit   [] Discharge to [facility]. Follow up with PT or MD PRN.     Physical Therapy: TREATMENT/PROGRESS NOTE   Patient: Vinnie Driver (70 y.o. male)   Examination Date: 2025   :  1955 MRN: [id number]   Visit #: 22*  7/8 visits  Insurance Allowable Auth Needed   24 until 25; 8 visits until 25 [x]Yes    []No    Insurance: Payor: [facility] MEDICARE / Plan: [facility] MEDICARE ADVANTAGE / Product Type: *No Product type* /   Insurance ID: [id number] - (Medicare Managed)  Secondary Insurance (if applicable):    Treatment Diagnosis: Left shoulder pain M25.512; Left shoulder stiffness M25.612; MM weakness M62.81  LEFT SHOULDER, ROTATOR CUFF REPAIR, SUBACROMIAL DECOMPRESSION, DISTAL CLAVICLE RESECTION, OPEN SUBPECTORAL BICEPS TENODESIS   SX 10/16/24   Medical Diagnosis:  Orthopedic aftercare [Z47.89]   Referring Physician: Hansel Ventura, AIRAM  PCP: Antonino Zarate, NIC - CNP     Plan of care signed (Y/N):

## 2025-02-06 ENCOUNTER — APPOINTMENT (OUTPATIENT)
Dept: PHYSICAL THERAPY | Age: 70
End: 2025-02-06
Payer: MEDICARE

## 2025-02-07 ENCOUNTER — HOSPITAL ENCOUNTER (OUTPATIENT)
Dept: PHYSICAL THERAPY | Age: 70
Setting detail: THERAPIES SERIES
Discharge: HOME | End: 2025-02-07
Payer: MEDICARE

## 2025-02-07 PROCEDURE — 97110 THERAPEUTIC EXERCISES: CPT | Performed by: PHYSICAL THERAPIST

## 2025-02-07 PROCEDURE — 97140 MANUAL THERAPY 1/> REGIONS: CPT | Performed by: PHYSICAL THERAPIST

## 2025-02-07 NOTE — FLOWSHEET NOTE
[facility]- Outpatient Rehabilitation and Therapy  [address], [place] [address] office: [phone] fax: [phone]        Physical Therapy: TREATMENT/PROGRESS NOTE   Patient: Vinnie Driver (70 y.o. male)   Examination Date: 2025   :  1955 MRN: [id number]   Visit #: 23*  8/8 visits  Insurance Allowable Auth Needed   24 until 25; 8 visits until 25  + 8 visits until 3/10/25 [x]Yes    []No    Insurance: Payor: [facility] MEDICARE / Plan: [facility] MEDICARE ADVANTAGE / Product Type: *No Product type* /   Insurance ID: [id number] - (Medicare Managed)  Secondary Insurance (if applicable):    Treatment Diagnosis: Left shoulder pain M25.512; Left shoulder stiffness M25.612; MM weakness M62.81  LEFT SHOULDER, ROTATOR CUFF REPAIR, SUBACROMIAL DECOMPRESSION, DISTAL CLAVICLE RESECTION, OPEN SUBPECTORAL BICEPS TENODESIS   SX 10/16/24   Medical Diagnosis:  Orthopedic aftercare [Z47.89]   Referring Physician: Hansel Ventura PA-C  PCP: Antonino Zarate APRN - CNP     Plan of care signed (Y/N):     Date of Patient follow up with Physician:      Plan of Care Report: NO  POC update due: (10 visits /OR AUTH LIMITS, whichever is less)  2025                                             Medical History:  Comorbidities:  Prior Surgeries: Cervical surgery 2024 with disc replacement; B elbow surgery   Relevant Medical History:                                          Precautions/ Contra-indications:  Follow Massive RTC repair with Biceps Teno           Latex allergy:  NO  Pacemaker:    NO  Contraindications for Manipulation: None and recent surgical history (relative)  Date of Surgery: 10/16/24  Other:    Red Flags:  None    Suicide Screening:   The patient did not verbalize a primary behavioral concern, suicidal ideation, suicidal intent, or demonstrate suicidal behaviors.    Preferred Language for Healthcare:   [x] English       [] other:    SUBJECTIVE EXAMINATION     Patient stated

## 2025-02-08 ENCOUNTER — APPOINTMENT (OUTPATIENT)
Dept: CT IMAGING | Age: 70
End: 2025-02-08
Payer: MEDICARE

## 2025-02-08 ENCOUNTER — HOSPITAL ENCOUNTER (EMERGENCY)
Age: 70
Discharge: HOME | End: 2025-02-08
Attending: EMERGENCY MEDICINE
Payer: MEDICARE

## 2025-02-08 VITALS
HEART RATE: 73 BPM | BODY MASS INDEX: 31.14 KG/M2 | DIASTOLIC BLOOD PRESSURE: 80 MMHG | SYSTOLIC BLOOD PRESSURE: 120 MMHG | RESPIRATION RATE: 17 BRPM | TEMPERATURE: 97.8 F | WEIGHT: 217.5 LBS | HEIGHT: 70 IN | OXYGEN SATURATION: 94 %

## 2025-02-08 DIAGNOSIS — R10.9 ACUTE ABDOMINAL PAIN IN RIGHT FLANK: Primary | ICD-10-CM

## 2025-02-08 DIAGNOSIS — R03.0 ELEVATED BLOOD PRESSURE READING: ICD-10-CM

## 2025-02-08 DIAGNOSIS — R11.0 NAUSEA: ICD-10-CM

## 2025-02-08 DIAGNOSIS — N20.0 BILATERAL KIDNEY STONES: ICD-10-CM

## 2025-02-08 LAB
ALBUMIN SERPL-MCNC: 4.2 G/DL (ref 3.4–5)
ALBUMIN/GLOB SERPL: 1.5 {RATIO} (ref 1.1–2.2)
ALP SERPL-CCNC: 93 U/L (ref 40–129)
ALT SERPL-CCNC: 40 U/L (ref 10–40)
ANION GAP SERPL CALCULATED.3IONS-SCNC: 12 MMOL/L (ref 3–16)
AST SERPL-CCNC: 31 U/L (ref 15–37)
BASOPHILS # BLD: 0 K/UL (ref 0–0.2)
BASOPHILS NFR BLD: 0.4 %
BILIRUB SERPL-MCNC: 0.5 MG/DL (ref 0–1)
BILIRUB UR QL STRIP.AUTO: NEGATIVE
BUN SERPL-MCNC: 12 MG/DL (ref 7–20)
CALCIUM SERPL-MCNC: 9.3 MG/DL (ref 8.3–10.6)
CHLORIDE SERPL-SCNC: 106 MMOL/L (ref 99–110)
CLARITY UR: CLEAR
CO2 SERPL-SCNC: 22 MMOL/L (ref 21–32)
COLOR UR: YELLOW
CREAT SERPL-MCNC: 0.9 MG/DL (ref 0.8–1.3)
DEPRECATED RDW RBC AUTO: 14.3 % (ref 12.4–15.4)
EKG ATRIAL RATE: 71 BPM
EKG DIAGNOSIS: NORMAL
EKG P AXIS: 19 DEGREES
EKG P-R INTERVAL: 118 MS
EKG Q-T INTERVAL: 392 MS
EKG QRS DURATION: 94 MS
EKG QTC CALCULATION (BAZETT): 425 MS
EKG R AXIS: 22 DEGREES
EKG T AXIS: 40 DEGREES
EKG VENTRICULAR RATE: 71 BPM
EOSINOPHIL # BLD: 0.2 K/UL (ref 0–0.6)
EOSINOPHIL NFR BLD: 2.3 %
GFR SERPLBLD CREATININE-BSD FMLA CKD-EPI: >90 ML/MIN/{1.73_M2}
GLUCOSE SERPL-MCNC: 148 MG/DL (ref 70–99)
GLUCOSE UR STRIP.AUTO-MCNC: NEGATIVE MG/DL
HCT VFR BLD AUTO: 45.5 % (ref 40.5–52.5)
HGB BLD-MCNC: 15.7 G/DL (ref 13.5–17.5)
HGB UR QL STRIP.AUTO: NEGATIVE
KETONES UR STRIP.AUTO-MCNC: NEGATIVE MG/DL
LEUKOCYTE ESTERASE UR QL STRIP.AUTO: NEGATIVE
LIPASE SERPL-CCNC: 38 U/L (ref 13–60)
LYMPHOCYTES # BLD: 3.3 K/UL (ref 1–5.1)
LYMPHOCYTES NFR BLD: 31 %
MCH RBC QN AUTO: 29.9 PG (ref 26–34)
MCHC RBC AUTO-ENTMCNC: 34.4 G/DL (ref 31–36)
MCV RBC AUTO: 87 FL (ref 80–100)
MONOCYTES # BLD: 0.8 K/UL (ref 0–1.3)
MONOCYTES NFR BLD: 7.9 %
NEUTROPHILS # BLD: 6.2 K/UL (ref 1.7–7.7)
NEUTROPHILS NFR BLD: 58.4 %
NITRITE UR QL STRIP.AUTO: NEGATIVE
PH UR STRIP.AUTO: 6 [PH] (ref 5–8)
PLATELET # BLD AUTO: 266 K/UL (ref 135–450)
PMV BLD AUTO: 7.9 FL (ref 5–10.5)
POTASSIUM SERPL-SCNC: 4.2 MMOL/L (ref 3.5–5.1)
PROT SERPL-MCNC: 7 G/DL (ref 6.4–8.2)
PROT UR STRIP.AUTO-MCNC: NEGATIVE MG/DL
RBC # BLD AUTO: 5.24 M/UL (ref 4.2–5.9)
SODIUM SERPL-SCNC: 140 MMOL/L (ref 136–145)
SP GR UR STRIP.AUTO: 1.01 (ref 1–1.03)
TROPONIN, HIGH SENSITIVITY: 28 NG/L (ref 0–22)
TROPONIN, HIGH SENSITIVITY: 29 NG/L (ref 0–22)
UA COMPLETE W REFLEX CULTURE PNL UR: NORMAL
UA DIPSTICK W REFLEX MICRO PNL UR: NORMAL
URN SPEC COLLECT METH UR: NORMAL
UROBILINOGEN UR STRIP-ACNC: 0.2 E.U./DL
WBC # BLD AUTO: 10.6 K/UL (ref 4–11)

## 2025-02-08 PROCEDURE — 99284 EMERGENCY DEPT VISIT MOD MDM: CPT

## 2025-02-08 PROCEDURE — 83690 ASSAY OF LIPASE: CPT

## 2025-02-08 PROCEDURE — 6360000002 HC RX W HCPCS: Performed by: EMERGENCY MEDICINE

## 2025-02-08 PROCEDURE — 85025 COMPLETE CBC W/AUTO DIFF WBC: CPT

## 2025-02-08 PROCEDURE — 96375 TX/PRO/DX INJ NEW DRUG ADDON: CPT

## 2025-02-08 PROCEDURE — 93010 ELECTROCARDIOGRAM REPORT: CPT | Performed by: INTERNAL MEDICINE

## 2025-02-08 PROCEDURE — 74176 CT ABD & PELVIS W/O CONTRAST: CPT

## 2025-02-08 PROCEDURE — 84484 ASSAY OF TROPONIN QUANT: CPT

## 2025-02-08 PROCEDURE — 81003 URINALYSIS AUTO W/O SCOPE: CPT

## 2025-02-08 PROCEDURE — 96376 TX/PRO/DX INJ SAME DRUG ADON: CPT

## 2025-02-08 PROCEDURE — 36415 COLL VENOUS BLD VENIPUNCTURE: CPT

## 2025-02-08 PROCEDURE — 93005 ELECTROCARDIOGRAM TRACING: CPT | Performed by: EMERGENCY MEDICINE

## 2025-02-08 PROCEDURE — 2580000003 HC RX 258: Performed by: EMERGENCY MEDICINE

## 2025-02-08 PROCEDURE — 96374 THER/PROPH/DIAG INJ IV PUSH: CPT

## 2025-02-08 PROCEDURE — 6370000000 HC RX 637 (ALT 250 FOR IP): Performed by: EMERGENCY MEDICINE

## 2025-02-08 PROCEDURE — 80053 COMPREHEN METABOLIC PANEL: CPT

## 2025-02-08 RX ORDER — ONDANSETRON 4 MG/1
4 TABLET, ORALLY DISINTEGRATING ORAL 3 TIMES DAILY PRN
Qty: 21 TABLET | Refills: 0 | Status: SHIPPED | OUTPATIENT
Start: 2025-02-08 | End: 2025-02-08

## 2025-02-08 RX ORDER — IBUPROFEN 600 MG/1
600 TABLET, FILM COATED ORAL EVERY 6 HOURS PRN
Qty: 40 TABLET | Refills: 0 | Status: SHIPPED | OUTPATIENT
Start: 2025-02-08

## 2025-02-08 RX ORDER — 0.9 % SODIUM CHLORIDE 0.9 %
500 INTRAVENOUS SOLUTION INTRAVENOUS ONCE
Status: COMPLETED | OUTPATIENT
Start: 2025-02-08 | End: 2025-02-08

## 2025-02-08 RX ORDER — OXYCODONE HYDROCHLORIDE 5 MG/1
5 TABLET ORAL ONCE
Status: COMPLETED | OUTPATIENT
Start: 2025-02-08 | End: 2025-02-08

## 2025-02-08 RX ORDER — HYDROCODONE BITARTRATE AND ACETAMINOPHEN 5; 325 MG/1; MG/1
1 TABLET ORAL EVERY 8 HOURS PRN
Qty: 5 TABLET | Refills: 0 | Status: SHIPPED | OUTPATIENT
Start: 2025-02-08 | End: 2025-02-10

## 2025-02-08 RX ORDER — ONDANSETRON 4 MG/1
4 TABLET, ORALLY DISINTEGRATING ORAL 3 TIMES DAILY PRN
Qty: 21 TABLET | Refills: 0 | Status: SHIPPED | OUTPATIENT
Start: 2025-02-08

## 2025-02-08 RX ORDER — METHOCARBAMOL 750 MG/1
1500 TABLET, FILM COATED ORAL ONCE
Status: COMPLETED | OUTPATIENT
Start: 2025-02-08 | End: 2025-02-08

## 2025-02-08 RX ORDER — HYDROCODONE BITARTRATE AND ACETAMINOPHEN 5; 325 MG/1; MG/1
1 TABLET ORAL EVERY 8 HOURS PRN
Qty: 5 TABLET | Refills: 0 | Status: SHIPPED | OUTPATIENT
Start: 2025-02-08 | End: 2025-02-08

## 2025-02-08 RX ORDER — LIDOCAINE 4 G/G
1 PATCH TOPICAL ONCE
Status: DISCONTINUED | OUTPATIENT
Start: 2025-02-08 | End: 2025-02-08 | Stop reason: HOSPADM

## 2025-02-08 RX ORDER — KETOROLAC TROMETHAMINE 30 MG/ML
15 INJECTION, SOLUTION INTRAMUSCULAR; INTRAVENOUS ONCE
Status: COMPLETED | OUTPATIENT
Start: 2025-02-08 | End: 2025-02-08

## 2025-02-08 RX ORDER — ONDANSETRON 2 MG/ML
4 INJECTION INTRAMUSCULAR; INTRAVENOUS ONCE
Status: COMPLETED | OUTPATIENT
Start: 2025-02-08 | End: 2025-02-08

## 2025-02-08 RX ADMIN — METHOCARBAMOL 1500 MG: 750 TABLET ORAL at 06:58

## 2025-02-08 RX ADMIN — OXYCODONE 5 MG: 5 TABLET ORAL at 06:58

## 2025-02-08 RX ADMIN — HYDROMORPHONE HYDROCHLORIDE 0.5 MG: 1 INJECTION, SOLUTION INTRAMUSCULAR; INTRAVENOUS; SUBCUTANEOUS at 06:58

## 2025-02-08 RX ADMIN — SODIUM CHLORIDE 500 ML: 0.9 INJECTION, SOLUTION INTRAVENOUS at 04:33

## 2025-02-08 RX ADMIN — KETOROLAC TROMETHAMINE 15 MG: 30 INJECTION, SOLUTION INTRAMUSCULAR at 04:33

## 2025-02-08 RX ADMIN — HYDROMORPHONE HYDROCHLORIDE 0.5 MG: 1 INJECTION, SOLUTION INTRAMUSCULAR; INTRAVENOUS; SUBCUTANEOUS at 05:27

## 2025-02-08 RX ADMIN — ONDANSETRON 4 MG: 2 INJECTION, SOLUTION INTRAMUSCULAR; INTRAVENOUS at 04:33

## 2025-02-08 ASSESSMENT — ENCOUNTER SYMPTOMS
BACK PAIN: 1
NAUSEA: 1
VOMITING: 0
SHORTNESS OF BREATH: 0
ABDOMINAL PAIN: 1
DIARRHEA: 0
CHEST TIGHTNESS: 0

## 2025-02-08 ASSESSMENT — PAIN SCALES - GENERAL
PAINLEVEL_OUTOF10: 8
PAINLEVEL_OUTOF10: 2

## 2025-02-08 ASSESSMENT — PAIN - FUNCTIONAL ASSESSMENT
PAIN_FUNCTIONAL_ASSESSMENT: 0-10
PAIN_FUNCTIONAL_ASSESSMENT: 0-10

## 2025-02-08 ASSESSMENT — PAIN DESCRIPTION - ORIENTATION: ORIENTATION: RIGHT;LOWER

## 2025-02-08 ASSESSMENT — PAIN DESCRIPTION - LOCATION: LOCATION: FLANK;BACK

## 2025-02-08 NOTE — ED PROVIDER NOTES
Sign out:   Patient signed out to me from Dr. Jose L Garg at approximately 6 AM.  At time of signout reevaluation and repeat troponins pending.      Brief HPI: Patient presents to the emergency department complaining of right lower flank pain.  Started this morning with nausea/sweating.  Patient reports that he knows the pain and that he has had it multiple times in the past relating it to previous kidney stones.  Patient states that pain is significantly relieved at this time and that nausea has also improved.  Current pain minimal.    Exam:  General: Resting comfortably.  No acute distress.  Head: Normocephalic/atraumatic.  Heart: Rhythm rhythm.  Normal S1-S2.  Lungs: Clear to auscultation bilaterally.  No wheezing, rales, rhonchi.  Abdomen: Soft.  Nontender.  Nondistended.  No rebound, or guarding.  No CVA tenderness.  Normal bowel sounds.            Repeat EKG:   Normal sinus rhythm with a rate of 71.  Normal axis.  Normal intervals and durations.  No ST or T wave changes appreciated.      Labs:  CBC with normal white count hemoglobin.  CMP stable with normal electrolytes and renal function.  No evidence of hepatitis/pancreatitis.  Troponin mildly elevated but stable x 2.  Urine is without evidence of UTI or hematuria.  CT abdomen/pelvis with noted bilateral nephrolithiasis.  No evidence of hydronephrosis.  Spinal degenerative changes noted.      MDM: I estimate the patient is at low risk of bowel obstruction, appendicitis, pancreatitis, or cholecystitis.  I further estimate the patient is at low risk of ACS.      1. Acute abdominal pain in right flank    2. Elevated blood pressure reading    3. Nausea    4. Bilateral kidney stones        Follow-up with PCP/urology recommended.  Medication sent for Zofran, hydrocodone, and Motrin.       Anthony Stoner II, DO  02/08/25 9903

## 2025-02-08 NOTE — DISCHARGE INSTRUCTIONS
Take Tylenol or ibuprofen as needed for pain.  Take Norco for breakthrough pain but do not drive with this and understand it can be addictive.  Do not take more than 3 g of Tylenol per day.  Take Zofran as needed for nausea.  Stay hydrated.    Follow-up with urology over the next 3 to 5 days for any other concerns.  Call your primary doctor for any other issues.    Return to the emergency department over the next 6 to 24 hours for any worsening pain with new onset chest pain with shortness of breath, new numbness or weakness in the legs, new testicle pain, persistent vomiting, or any other concerns.

## 2025-02-08 NOTE — ED PROVIDER NOTES
[facility] EMERGENCY DEPARTMENT  EMERGENCY DEPARTMENT ENCOUNTER        Pt Name: Vinnie Driver  MRN: [id number]  Birthdate 1955  Date of evaluation: 2/8/2025  Provider: Jose L Garg MD  PCP: Antonino Zarate, APRN - CNP      CHIEF COMPLAINT       Chief Complaint   Patient presents with    Flank Pain     Right sided flank and lower back pain started approx 3 hrs ago. Hx of kidney stones states it feels like one       Nausea       HISTORY OFPRESENT ILLNESS   (Location/Symptom, Timing/Onset, Context/Setting, Quality, Duration, Modifying Factors,Severity)  Note limiting factors.     Vinnie Driver is a 70 y.o. male presenting today due to concern for developing significant right sided flank pain radiating towards the right abdomen starting about 3 hours prior to arrival along with significant nausea.  He has a history of kidney stones and states this feels just like prior episodes with kidney stones.  He denies any chest pain or shortness of breath or pain with breathing.  He denies any radiation of the pain down the legs.  No testicular pain.  He denies any actual vomiting.  No fever.  He felt fine when he went to bed.  He is on Eliquis.  No reported falls or trauma.  No numbness or weakness.  Due to persistent pain and not feeling well, he came to the emergency department with his wife for further evaluation.  He did need surgery for a kidney stone years ago but nothing recently.  Again, this feels just like prior history of kidney stones.        REVIEW OF SYSTEMS    (2-9 systems for level 4, 10 or more for level 5)     Review of Systems   Constitutional:  Negative for chills, fatigue and fever.   Respiratory:  Negative for chest tightness and shortness of breath.    Cardiovascular:  Negative for chest pain.   Gastrointestinal:  Positive for abdominal pain (mainly in his right flank, though) and nausea. Negative for diarrhea and vomiting.   Genitourinary:  Positive for flank pain (right).

## 2025-02-12 ENCOUNTER — HOSPITAL ENCOUNTER (OUTPATIENT)
Dept: PHYSICAL THERAPY | Age: 70
Setting detail: THERAPIES SERIES
End: 2025-02-12
Payer: MEDICARE

## 2025-02-14 ENCOUNTER — HOSPITAL ENCOUNTER (OUTPATIENT)
Dept: PHYSICAL THERAPY | Age: 70
Setting detail: THERAPIES SERIES
Discharge: HOME | End: 2025-02-14
Payer: MEDICARE

## 2025-02-14 PROCEDURE — 97140 MANUAL THERAPY 1/> REGIONS: CPT | Performed by: PHYSICAL THERAPIST

## 2025-02-14 PROCEDURE — 97110 THERAPEUTIC EXERCISES: CPT | Performed by: PHYSICAL THERAPIST

## 2025-02-14 NOTE — FLOWSHEET NOTE
[facility]- Outpatient Rehabilitation and Therapy  [address], [place] [address] office: [phone] fax: [phone]        Physical Therapy: TREATMENT/PROGRESS NOTE   Patient: Vinnie Driver (70 y.o. male)   Examination Date: 2025   :  1955 MRN: [id number]   Visit #: 24*  8/8 visits +1/8  Insurance Allowable Auth Needed   24 until 25; 8 visits until 25  + 8 visits until 3/10/25 [x]Yes    []No    Insurance: Payor: [facility] MEDICARE / Plan: [facility] MEDICARE ADVANTAGE / Product Type: *No Product type* /   Insurance ID: [id number] - (Medicare Managed)  Secondary Insurance (if applicable):    Treatment Diagnosis: Left shoulder pain M25.512; Left shoulder stiffness M25.612; MM weakness M62.81  LEFT SHOULDER, ROTATOR CUFF REPAIR, SUBACROMIAL DECOMPRESSION, DISTAL CLAVICLE RESECTION, OPEN SUBPECTORAL BICEPS TENODESIS   SX 10/16/24   Medical Diagnosis:  Orthopedic aftercare [Z47.89]   Referring Physician: Hansel Ventura PA-C  PCP: Antonino Zarate APRN - CNP     Plan of care signed (Y/N):     Date of Patient follow up with Physician:      Plan of Care Report: NO  POC update due: (10 visits /OR AUTH LIMITS, whichever is less)  2025                                             Medical History:  Comorbidities:  Prior Surgeries: Cervical surgery 2024 with disc replacement; B elbow surgery   Relevant Medical History:                                          Precautions/ Contra-indications:  Follow Massive RTC repair with Biceps Teno           Latex allergy:  NO  Pacemaker:    NO  Contraindications for Manipulation: None and recent surgical history (relative)  Date of Surgery: 10/16/24  Other:    Red Flags:  None    Suicide Screening:   The patient did not verbalize a primary behavioral concern, suicidal ideation, suicidal intent, or demonstrate suicidal behaviors.    Preferred Language for Healthcare:   [x] English       [] other:    SUBJECTIVE EXAMINATION     Patient  Patient is returning call, please see message below.     Callback Number:     Best Availability: anytime   Can A Detailed Message Be Left? yes  Did you confirm the message with the caller?: yes    Thank you,  Mary Carey

## 2025-02-17 ENCOUNTER — TELEPHONE (OUTPATIENT)
Dept: INTERNAL MEDICINE CLINIC | Age: 70
End: 2025-02-17

## 2025-02-17 RX ORDER — HYDROXYZINE HYDROCHLORIDE 25 MG/1
25 TABLET, FILM COATED ORAL 2 TIMES DAILY PRN
Qty: 8 TABLET | Refills: 0 | Status: SHIPPED | OUTPATIENT
Start: 2025-02-17 | End: 2025-02-21

## 2025-02-17 NOTE — TELEPHONE ENCOUNTER
Patient called to report that he is flying out of town on this Friday 2/21.  Due to severe fear of flying he is requesting a medication for the flight out and then for the return flight.    He states this was done for him once before.    Pharmacy is [facility]

## 2025-02-18 DIAGNOSIS — Z00.00 WELL ADULT EXAM: ICD-10-CM

## 2025-02-18 LAB
25(OH)D3 SERPL-MCNC: 26.3 NG/ML
CHOLEST SERPL-MCNC: 153 MG/DL (ref 0–199)
EST. AVERAGE GLUCOSE BLD GHB EST-MCNC: 139.9 MG/DL
HBA1C MFR BLD: 6.5 %
HDLC SERPL-MCNC: 51 MG/DL (ref 40–60)
LDLC SERPL CALC-MCNC: 69 MG/DL
PSA SERPL DL<=0.01 NG/ML-MCNC: 5.1 NG/ML (ref 0–4)
TRIGL SERPL-MCNC: 166 MG/DL (ref 0–150)
VLDLC SERPL CALC-MCNC: 33 MG/DL

## 2025-02-19 ENCOUNTER — HOSPITAL ENCOUNTER (OUTPATIENT)
Dept: PHYSICAL THERAPY | Age: 70
Setting detail: THERAPIES SERIES
Discharge: HOME | End: 2025-02-19
Payer: MEDICARE

## 2025-02-19 PROCEDURE — 97140 MANUAL THERAPY 1/> REGIONS: CPT | Performed by: PHYSICAL THERAPIST

## 2025-02-19 NOTE — FLOWSHEET NOTE
[facility]- Outpatient Rehabilitation and Therapy  [address], [place] [address] office: [phone] fax: [phone]        Physical Therapy: TREATMENT/PROGRESS NOTE   Patient: Vinnie rDiver (70 y.o. male)   Examination Date: 2025   :  1955 MRN: [id number]   Visit #: 25*  8/8 visits +2/8  Insurance Allowable Auth Needed   24 until 25; 8 visits until 25  + 8 visits until 3/10/25 [x]Yes    []No    Insurance: Payor: [facility] MEDICARE / Plan: [facility] MEDICARE ADVANTAGE / Product Type: *No Product type* /   Insurance ID: [id number] - (Medicare Managed)  Secondary Insurance (if applicable):    Treatment Diagnosis: Left shoulder pain M25.512; Left shoulder stiffness M25.612; MM weakness M62.81  LEFT SHOULDER, ROTATOR CUFF REPAIR, SUBACROMIAL DECOMPRESSION, DISTAL CLAVICLE RESECTION, OPEN SUBPECTORAL BICEPS TENODESIS   SX 10/16/24   Medical Diagnosis:  Orthopedic aftercare [Z47.89]   Referring Physician: Hansel Ventura PA-C  PCP: Antonino Zarate APRN - CNP     Plan of care signed (Y/N):     Date of Patient follow up with Physician:      Plan of Care Report: NO  POC update due: (10 visits /OR AUTH LIMITS, whichever is less)  2025                                             Medical History:  Comorbidities:  Prior Surgeries: Cervical surgery 2024 with disc replacement; B elbow surgery   Relevant Medical History:                                          Precautions/ Contra-indications:  Follow Massive RTC repair with Biceps Teno           Latex allergy:  NO  Pacemaker:    NO  Contraindications for Manipulation: None and recent surgical history (relative)  Date of Surgery: 10/16/24  Other:    Red Flags:  None    Suicide Screening:   The patient did not verbalize a primary behavioral concern, suicidal ideation, suicidal intent, or demonstrate suicidal behaviors.    Preferred Language for Healthcare:   [x] English       [] other:    SUBJECTIVE EXAMINATION     Patient

## 2025-02-21 ENCOUNTER — APPOINTMENT (OUTPATIENT)
Dept: PHYSICAL THERAPY | Age: 70
End: 2025-02-21
Payer: MEDICARE

## 2025-02-24 ENCOUNTER — HOSPITAL ENCOUNTER (OUTPATIENT)
Dept: PHYSICAL THERAPY | Age: 70
Setting detail: THERAPIES SERIES
Discharge: HOME | End: 2025-02-24
Payer: MEDICARE

## 2025-02-24 PROCEDURE — 97110 THERAPEUTIC EXERCISES: CPT | Performed by: PHYSICAL THERAPIST

## 2025-02-24 PROCEDURE — 97112 NEUROMUSCULAR REEDUCATION: CPT | Performed by: PHYSICAL THERAPIST

## 2025-02-24 PROCEDURE — 97140 MANUAL THERAPY 1/> REGIONS: CPT | Performed by: PHYSICAL THERAPIST

## 2025-02-24 NOTE — FLOWSHEET NOTE
endurance, ROM performed to prevent loss of range of motion, maintain or improve muscular strength or increase flexibility, following either an injury or surgery.   (20835) NEUROMUSCULAR RE-EDUCATION - Provided therapeutic procedure on activities related to neuromuscular reeducation of movement, balance, coordination, kinesthetic sense, posture, and/or proprioception for sitting and/or standing activities. Provided HEP review and/or progression.  (06348) MANUAL THERAPY -  Manual therapy techniques, 1 or more regions, each 15 minutes (Mobilization/manipulation, manual lymphatic drainage, manual traction) for the purpose of modulating pain, promoting relaxation,  increasing ROM, reducing/eliminating soft tissue swelling/inflammation/restriction, improving soft tissue extensibility and allowing for proper ROM for normal function with self care, mobility, lifting and ambulation    GOALS     Patient stated goal: Return to PLOF  [x] Progressing: [] Met: [] Not Met: [] Adjusted    Therapist goals for Patient:   Short Term Goals: To be achieved in: 2 weeks  1. Independent in HEP and progression per patient tolerance, in order to prevent re-injury.   [] Progressing: [x] Met: [] Not Met: [] Adjusted  2. Patient will have a decrease in pain to <2-3/10 to facilitate improvement in movement, function, and ADLs as indicated by Functional Deficits.  [] Progressing: [x] Met: [] Not Met: [] Adjusted      Long Term Goals: To be achieved in: 16 weeks  1. Disability index score of 30% or less for the Quick DASH to assist with reaching prior level of function with activities such as overhead activities.  [x] Progressing: [] Met: [] Not Met: [] Adjusted - @ 41% disability   2. Patient will demonstrate increased AROM of shoulder to 150 deg elevation and 75 deg PROM ER without pain to allow for proper joint functioning to enable patient to reach overhead.   [x] Progressing: [] Met: [] Not Met: [] Adjusted - @ 145 deg active flexion, 70 deg

## 2025-02-27 ENCOUNTER — HOSPITAL ENCOUNTER (OUTPATIENT)
Dept: PHYSICAL THERAPY | Age: 70
Setting detail: THERAPIES SERIES
Discharge: HOME | End: 2025-02-27
Payer: MEDICARE

## 2025-02-27 PROCEDURE — 97110 THERAPEUTIC EXERCISES: CPT | Performed by: PHYSICAL THERAPIST

## 2025-02-27 PROCEDURE — 97140 MANUAL THERAPY 1/> REGIONS: CPT | Performed by: PHYSICAL THERAPIST

## 2025-02-27 PROCEDURE — 97112 NEUROMUSCULAR REEDUCATION: CPT | Performed by: PHYSICAL THERAPIST

## 2025-02-27 NOTE — FLOWSHEET NOTE
[facility]- Outpatient Rehabilitation and Therapy  [address], [place] [address] office: [phone] fax: [phone]        Physical Therapy: TREATMENT/PROGRESS NOTE   Patient: Vinnie Driver (70 y.o. male)   Examination Date: 2025   :  1955 MRN: [id number]   Visit #: 27*  8/8 visits +4/8  Insurance Allowable Auth Needed   24 until 25; 8 visits until 25  + 8 visits until 3/10/25 [x]Yes    []No    Insurance: Payor: [facility] MEDICARE / Plan: [facility] MEDICARE ADVANTAGE / Product Type: *No Product type* /   Insurance ID: [id number] - (Medicare Managed)  Secondary Insurance (if applicable):    Treatment Diagnosis: Left shoulder pain M25.512; Left shoulder stiffness M25.612; MM weakness M62.81  LEFT SHOULDER, ROTATOR CUFF REPAIR, SUBACROMIAL DECOMPRESSION, DISTAL CLAVICLE RESECTION, OPEN SUBPECTORAL BICEPS TENODESIS   SX 10/16/24   Medical Diagnosis:  Orthopedic aftercare [Z47.89]   Referring Physician: Hansel Ventura PA-C  PCP: Antonino Zarate APRN - CNP     Plan of care signed (Y/N):     Date of Patient follow up with Physician:      Plan of Care Report: NO  POC update due: (10 visits /OR AUTH LIMITS, whichever is less)  2025                                             Medical History:  Comorbidities:  Prior Surgeries: Cervical surgery 2024 with disc replacement; B elbow surgery   Relevant Medical History:                                          Precautions/ Contra-indications:  Follow Massive RTC repair with Biceps Teno           Latex allergy:  NO  Pacemaker:    NO  Contraindications for Manipulation: None and recent surgical history (relative)  Date of Surgery: 10/16/24  Other:    Red Flags:  None    Suicide Screening:   The patient did not verbalize a primary behavioral concern, suicidal ideation, suicidal intent, or demonstrate suicidal behaviors.    Preferred Language for Healthcare:   [x] English       [] other:    SUBJECTIVE EXAMINATION     Patient

## 2025-03-03 ENCOUNTER — HOSPITAL ENCOUNTER (OUTPATIENT)
Dept: PHYSICAL THERAPY | Age: 70
Setting detail: THERAPIES SERIES
Discharge: HOME | End: 2025-03-03
Payer: MEDICARE

## 2025-03-03 PROCEDURE — 97112 NEUROMUSCULAR REEDUCATION: CPT | Performed by: PHYSICAL THERAPIST

## 2025-03-03 PROCEDURE — 97110 THERAPEUTIC EXERCISES: CPT | Performed by: PHYSICAL THERAPIST

## 2025-03-03 PROCEDURE — 97140 MANUAL THERAPY 1/> REGIONS: CPT | Performed by: PHYSICAL THERAPIST

## 2025-03-03 NOTE — FLOWSHEET NOTE
[facility]- Outpatient Rehabilitation and Therapy  [address], [place] [address] office: [phone] fax: [phone]        Physical Therapy: TREATMENT/PROGRESS NOTE   Patient: Vinnie Driver (70 y.o. male)   Examination Date: 2025   :  1955 MRN: [id number]   Visit #: 28*  8/8 visits +5/8  Insurance Allowable Auth Needed   24 until 25; 8 visits until 25  + 8 visits until 3/10/25 [x]Yes    []No    Insurance: Payor: [facility] MEDICARE / Plan: [facility] MEDICARE ADVANTAGE / Product Type: *No Product type* /   Insurance ID: [id number] - (Medicare Managed)  Secondary Insurance (if applicable):    Treatment Diagnosis: Left shoulder pain M25.512; Left shoulder stiffness M25.612; MM weakness M62.81  LEFT SHOULDER, ROTATOR CUFF REPAIR, SUBACROMIAL DECOMPRESSION, DISTAL CLAVICLE RESECTION, OPEN SUBPECTORAL BICEPS TENODESIS   SX 10/16/24   Medical Diagnosis:  Orthopedic aftercare [Z47.89]   Referring Physician: Hansel Ventura PA-C  PCP: Antonino Zarate APRN - CNP     Plan of care signed (Y/N):     Date of Patient follow up with Physician:      Plan of Care Report: NO  POC update due: (10 visits /OR AUTH LIMITS, whichever is less)  2025                                             Medical History:  Comorbidities:  Prior Surgeries: Cervical surgery 2024 with disc replacement; B elbow surgery   Relevant Medical History:                                          Precautions/ Contra-indications:  Follow Massive RTC repair with Biceps Teno           Latex allergy:  NO  Pacemaker:    NO  Contraindications for Manipulation: None and recent surgical history (relative)  Date of Surgery: 10/16/24  Other:    Red Flags:  None    Suicide Screening:   The patient did not verbalize a primary behavioral concern, suicidal ideation, suicidal intent, or demonstrate suicidal behaviors.    Preferred Language for Healthcare:   [x] English       [] other:    SUBJECTIVE EXAMINATION     Patient

## 2025-03-06 ENCOUNTER — HOSPITAL ENCOUNTER (OUTPATIENT)
Dept: PHYSICAL THERAPY | Age: 70
Setting detail: THERAPIES SERIES
Discharge: HOME | End: 2025-03-06
Payer: MEDICARE

## 2025-03-06 PROCEDURE — 97140 MANUAL THERAPY 1/> REGIONS: CPT | Performed by: PHYSICAL THERAPIST

## 2025-03-06 PROCEDURE — 97112 NEUROMUSCULAR REEDUCATION: CPT | Performed by: PHYSICAL THERAPIST

## 2025-03-06 PROCEDURE — 97110 THERAPEUTIC EXERCISES: CPT | Performed by: PHYSICAL THERAPIST

## 2025-03-06 NOTE — FLOWSHEET NOTE
enable patient to reach overhead.   [x] Progressing: [] Met: [] Not Met: [] Adjusted - @ 145 deg active flexion, 70 deg PROM ER  3. Patient will demonstrate increased Strength of shoulder to at least 4/5 throughout without pain to allow for proper functional mobility to enable patient to return to lifting.   [x] Progressing: [] Met: [] Not Met: [] Adjusted - functional strength improving as able to reach overhead without load at this time  4. Patient will return to putting objects into cabinets and refrigerator without increased symptoms or restriction.   [x] Progressing: [] Met: [] Not Met: [] Adjusted - Able to reach overhead to style hair and into cabinets but able to perform with weight greater than couple pounds  5. Patient will return to modified gym workouts keeping in safe shoulder zone.    [x] Progressing: [] Met: [] Not Met: [] Adjusted - starting to incorporate light gym strengthening for UE    Overall Progression Towards Functional goals/ Treatment Progress Update:  [x] Patient is progressing as expected towards functional goals listed.    [] Progression is slowed due to complexities/Impairments listed.  [] Progression has been slowed due to co-morbidities.  [] Plan just implemented, too soon (<30days) to assess goals progression   [] Goals require adjustment due to lack of progress  [] Patient is not progressing as expected and requires additional follow up with physician  [] Other:     TREATMENT PLAN     Frequency/Duration: 1-2x/week for  16  weeks for the following treatment interventions:    Interventions:  Therapeutic Exercise (07812) including: strength training, ROM, and functional mobility  Therapeutic Activities (78118) including: functional mobility training and education.  Neuromuscular Re-education (20569) activation and proprioception, including postural re-education.    Manual Therapy (82711) as indicated to include: Passive Range of Motion, Gr I-IV mobilizations, and Soft Tissue

## 2025-03-10 ENCOUNTER — HOSPITAL ENCOUNTER (OUTPATIENT)
Dept: PHYSICAL THERAPY | Age: 70
Setting detail: THERAPIES SERIES
Discharge: HOME | End: 2025-03-10
Payer: MEDICARE

## 2025-03-10 PROCEDURE — 97140 MANUAL THERAPY 1/> REGIONS: CPT | Performed by: PHYSICAL THERAPIST

## 2025-03-10 PROCEDURE — 97110 THERAPEUTIC EXERCISES: CPT | Performed by: PHYSICAL THERAPIST

## 2025-03-10 NOTE — FLOWSHEET NOTE
[facility]- Outpatient Rehabilitation and Therapy  [address], [place] [address] office: [phone] fax: [phone]    Physical Therapy Re-Certification Plan of Care    Dear Dr. Fofana,    We had the pleasure of treating the following patient for physical therapy services at [facility]. A summary of our findings can be found in the updated assessment below.  This includes our plan of care.  If you have any questions or concerns regarding these findings, please do not hesitate to contact me at the office phone number checked above.  Thank you for the referral.     Physician Signature:________________________________Date:__________________  By signing above (or electronic signature), therapist's plan is approved by physician      Total Visits: 26     Overall Response to Treatment:  Patient is responding well to treatment and improvement is noted with regards to goals    Recommendation:    [x] Continue PT 1x / wk for 6 weeks.   [] Hold PT, pending MD visit   [] Discharge to [facility]. Follow up with PT or MD PRN.       Physical Therapy: TREATMENT/PROGRESS NOTE   Patient: Vinnie Driver (70 y.o. male)   Examination Date: 03/10/2025   :  1955 MRN: [id number]   Visit #: 29*  8/8 visits +7/8  Insurance Allowable Auth Needed   24 until 25; 8 visits until 25  + 8 visits until 3/10/25 [x]Yes    []No    Insurance: Payor: [facility] MEDICARE / Plan: [facility] MEDICARE ADVANTAGE / Product Type: *No Product type* /   Insurance ID: [id number] - (Medicare Managed)  Secondary Insurance (if applicable):    Treatment Diagnosis: Left shoulder pain M25.512; Left shoulder stiffness M25.612; MM weakness M62.81  LEFT SHOULDER, ROTATOR CUFF REPAIR, SUBACROMIAL DECOMPRESSION, DISTAL CLAVICLE RESECTION, OPEN SUBPECTORAL BICEPS TENODESIS   SX 10/16/24   Medical Diagnosis:  Orthopedic aftercare [Z47.89]   Referring Physician: Ct PCP: Antonino Zarate, APRN - CNP     Plan of

## 2025-03-17 ENCOUNTER — HOSPITAL ENCOUNTER (OUTPATIENT)
Dept: PHYSICAL THERAPY | Age: 70
Setting detail: THERAPIES SERIES
Discharge: HOME | End: 2025-03-17
Payer: MEDICARE

## 2025-03-17 PROCEDURE — 97110 THERAPEUTIC EXERCISES: CPT | Performed by: PHYSICAL THERAPIST

## 2025-03-17 PROCEDURE — 97140 MANUAL THERAPY 1/> REGIONS: CPT | Performed by: PHYSICAL THERAPIST

## 2025-03-17 NOTE — FLOWSHEET NOTE
[facility]- Outpatient Rehabilitation and Therapy  [address], [place] [address] office: [phone] fax: [phone]      Physical Therapy: TREATMENT/PROGRESS NOTE   Patient: Vinnie Driver (70 y.o. male)   Examination Date: 2025   :  1955 MRN: [id number]   Visit #: 30*  8/8 visits +7/8 + 1/6  Insurance Allowable Auth Needed   24 until 25; 8 visits until 25  + 8 visits until 3/10/25+ 6 visits until 25 [x]Yes    []No    Insurance: Payor: [facility] MEDICARE / Plan: [facility] MEDICARE ADVANTAGE / Product Type: *No Product type* /   Insurance ID: [id number] - (Medicare Managed)  Secondary Insurance (if applicable):    Treatment Diagnosis: Left shoulder pain M25.512; Left shoulder stiffness M25.612; MM weakness M62.81  LEFT SHOULDER, ROTATOR CUFF REPAIR, SUBACROMIAL DECOMPRESSION, DISTAL CLAVICLE RESECTION, OPEN SUBPECTORAL BICEPS TENODESIS   SX 10/16/24   Medical Diagnosis:  Orthopedic aftercare [Z47.89]   Referring Physician: Ct PCP: Antonino Zarate APRN - CNP     Plan of care signed (Y/N):     Date of Patient follow up with Physician:      Plan of Care Report: NO  POC update due: (10 visits /OR AUTH LIMITS, whichever is less)  2025                                             Medical History:  Comorbidities:  Prior Surgeries: Cervical surgery 2024 with disc replacement; B elbow surgery   Relevant Medical History:                                          Precautions/ Contra-indications:  Follow Massive RTC repair with Biceps Teno           Latex allergy:  NO  Pacemaker:    NO  Contraindications for Manipulation: None and recent surgical history (relative)  Date of Surgery: 10/16/24  Other:    Red Flags:  None    Suicide Screening:   The patient did not verbalize a primary behavioral concern, suicidal ideation, suicidal intent, or demonstrate suicidal behaviors.    Preferred Language for Healthcare:   [x] English       [] other:    SUBJECTIVE EXAMINATION

## 2025-03-20 ENCOUNTER — APPOINTMENT (OUTPATIENT)
Dept: PHYSICAL THERAPY | Age: 70
End: 2025-03-20
Payer: MEDICARE

## 2025-03-25 DIAGNOSIS — E78.5 DYSLIPIDEMIA: ICD-10-CM

## 2025-03-25 NOTE — TELEPHONE ENCOUNTER
Refill request for ATORVASTATIN 80 MG TABLET medication.     Name of Pharmacy- [facility]      Last visit - 1-     Pending visit - N/A    Last refill - 12-      Medication Contract signed -   Last Oarrs ran-         Additional Comments

## 2025-03-26 ENCOUNTER — HOSPITAL ENCOUNTER (OUTPATIENT)
Dept: PHYSICAL THERAPY | Age: 70
Setting detail: THERAPIES SERIES
Discharge: HOME | End: 2025-03-26
Payer: MEDICARE

## 2025-03-26 PROCEDURE — 97140 MANUAL THERAPY 1/> REGIONS: CPT | Performed by: PHYSICAL THERAPIST

## 2025-03-26 PROCEDURE — 97110 THERAPEUTIC EXERCISES: CPT | Performed by: PHYSICAL THERAPIST

## 2025-03-26 RX ORDER — ATORVASTATIN CALCIUM 80 MG/1
80 TABLET, FILM COATED ORAL
Qty: 90 TABLET | Refills: 3 | Status: SHIPPED | OUTPATIENT
Start: 2025-03-26

## 2025-04-04 ENCOUNTER — HOSPITAL ENCOUNTER (OUTPATIENT)
Dept: PHYSICAL THERAPY | Age: 70
Setting detail: THERAPIES SERIES
Discharge: HOME | End: 2025-04-04
Payer: MEDICARE

## 2025-04-04 PROCEDURE — 97110 THERAPEUTIC EXERCISES: CPT | Performed by: PHYSICAL THERAPIST

## 2025-04-04 PROCEDURE — 97140 MANUAL THERAPY 1/> REGIONS: CPT | Performed by: PHYSICAL THERAPIST

## 2025-04-04 NOTE — FLOWSHEET NOTE
goal: Return to PLOF  [x] Progressing: [] Met: [] Not Met: [] Adjusted    Therapist goals for Patient:   Short Term Goals: To be achieved in: 2 weeks  1. Independent in HEP and progression per patient tolerance, in order to prevent re-injury.   [] Progressing: [x] Met: [] Not Met: [] Adjusted  2. Patient will have a decrease in pain to <2-3/10 to facilitate improvement in movement, function, and ADLs as indicated by Functional Deficits.  [] Progressing: [x] Met: [] Not Met: [] Adjusted      Long Term Goals: To be achieved in: 16 weeks  1. Disability index score of 30% or less for the Quick DASH to assist with reaching prior level of function with activities such as overhead activities.  [x] Progressing: [] Met: [] Not Met: [] Adjusted - @ 36% disability   2. Patient will demonstrate increased AROM of shoulder to 150 deg elevation and 75 deg PROM ER without pain to allow for proper joint functioning to enable patient to reach overhead.   [] Progressing: [x] Met: [] Not Met: [] Adjusted - @ 155 deg active flexion, 75 deg PROM ER  3. Patient will demonstrate increased Strength of shoulder to at least 4/5 throughout without pain to allow for proper functional mobility to enable patient to return to lifting.   [x] Progressing: [] Met: [] Not Met: [] Adjusted - functional strength improving as able to reach overhead with great motion.  Still lacks full strength to be able to lift heavier objects.   4. Patient will return to putting objects into cabinets and refrigerator without increased symptoms or restriction.   [] Progressing: [x] Met: [] Not Met: [] Adjusted     5. Patient will return to modified gym workouts keeping in safe shoulder zone.    [] Progressing: [x] Met: [] Not Met: [] Adjusted    Overall Progression Towards Functional goals/ Treatment Progress Update:  [x] Patient is progressing as expected towards functional goals listed.    [] Progression is slowed due to complexities/Impairments listed.  []

## 2025-04-09 ENCOUNTER — HOSPITAL ENCOUNTER (OUTPATIENT)
Dept: PHYSICAL THERAPY | Age: 70
Setting detail: THERAPIES SERIES
Discharge: HOME | End: 2025-04-09
Payer: MEDICARE

## 2025-04-09 PROCEDURE — 97140 MANUAL THERAPY 1/> REGIONS: CPT | Performed by: PHYSICAL THERAPIST

## 2025-04-09 NOTE — FLOWSHEET NOTE
[facility]- Outpatient Rehabilitation and Therapy  [address], [place] [address] office: [phone] fax: [phone]      Physical Therapy: TREATMENT/PROGRESS NOTE   Patient: Vinnie Driver (70 y.o. male)   Examination Date: 2025   :  1955 MRN: [id number]   Visit #: 32*  8/8 visits +7/8 + 4/6  Insurance Allowable Auth Needed   24 until 25; 8 visits until 25  + 8 visits until 3/10/25+ 6 visits until 25 [x]Yes    []No    Insurance: Payor: [facility] MEDICARE / Plan: [facility] MEDICARE ADVANTAGE / Product Type: *No Product type* /   Insurance ID: [id number] - (Medicare Managed)  Secondary Insurance (if applicable):    Treatment Diagnosis: Left shoulder pain M25.512; Left shoulder stiffness M25.612; MM weakness M62.81  LEFT SHOULDER, ROTATOR CUFF REPAIR, SUBACROMIAL DECOMPRESSION, DISTAL CLAVICLE RESECTION, OPEN SUBPECTORAL BICEPS TENODESIS   SX 10/16/24   Medical Diagnosis:  Orthopedic aftercare [Z47.89]   Referring Physician: Ct PCP: Antonino Zarate APRN - CNP     Plan of care signed (Y/N):     Date of Patient follow up with Physician:      Plan of Care Report: NO  POC update due: (10 visits /OR AUTH LIMITS, whichever is less)  2025                                             Medical History:  Comorbidities:  Prior Surgeries: Cervical surgery 2024 with disc replacement; B elbow surgery   Relevant Medical History:                                          Precautions/ Contra-indications:  Follow Massive RTC repair with Biceps Teno           Latex allergy:  NO  Pacemaker:    NO  Contraindications for Manipulation: None and recent surgical history (relative)  Date of Surgery: 10/16/24  Other:    Red Flags:  None    Suicide Screening:   The patient did not verbalize a primary behavioral concern, suicidal ideation, suicidal intent, or demonstrate suicidal behaviors.    Preferred Language for Healthcare:   [x] English       [] other:    SUBJECTIVE EXAMINATION

## 2025-04-17 ENCOUNTER — OFFICE VISIT (OUTPATIENT)
Dept: ORTHOPEDIC SURGERY | Age: 70
End: 2025-04-17
Payer: MEDICARE

## 2025-04-17 VITALS — WEIGHT: 217 LBS | HEIGHT: 70 IN | BODY MASS INDEX: 31.07 KG/M2

## 2025-04-17 DIAGNOSIS — Z98.890 S/P SHOULDER SURGERY: Primary | ICD-10-CM

## 2025-04-17 PROCEDURE — 1159F MED LIST DOCD IN RCRD: CPT | Performed by: ORTHOPAEDIC SURGERY

## 2025-04-17 PROCEDURE — 1123F ACP DISCUSS/DSCN MKR DOCD: CPT | Performed by: ORTHOPAEDIC SURGERY

## 2025-04-17 PROCEDURE — 99213 OFFICE O/P EST LOW 20 MIN: CPT | Performed by: ORTHOPAEDIC SURGERY

## 2025-04-17 NOTE — PROGRESS NOTES
POST OPERATIVE ORTHOPAEDIC NOTE    DOS: 10/16/2024  PROCEDURES: Left shoulder arthroscopic massive rotator cuff repair, subacromial decompression and distal clavicle resection and open subpectoral biceps tenodesis    The patient has been recovering. The patient states the pain is 0/10 pain however with certain rotations 4-5/10 discomfort.  The patient has continued PT. patient states with reaching behind his back he is noticed still some decreased range of motion.  He is pleased with the results of the procedure in terms of restoring function and he denies pain with it waking him up at night.  He states that he even sleeps on that shoulder without discomfort    Focused pertinent physical examination of the operative extremity:  Wounds C/D/I, well healing surgical incisions  170 degrees forward flexion, 30 degrees external rotation, internal rotation L5  5/5 rotator cuff testing throughout  Nontender to palpation over the collarbone joint area  Good cosmesis on biceps firing  Neurovascular exam unchanged     Diagnosis Orders   1. S/P shoulder surgery          Assessment and plan: The patient is now 6 months status post the above listed procedure and is recovering    .    --Greater than 50% of the time (11/20 minutes) was spent coordinating care and discussing postoperative recovery course  - I had a pleasant discussion with the patient today.  I reviewed with him that he has done quite well with his overall functional return and recovery.  He is pleased with the results thus far as am I given the severity of issue and injury that he had  - I do suspect that some of the discomfort he has is with some of the internal rotation where he points to his discomfort.  He has great strength on rotator cuff testing and I suspect that this could be just some inherent limitations as he continues to recover  - OTC Tylenol per bottle as needed discomfort to include OTC Aleve per bottle as needed discomfort  -I reviewed with him

## 2025-04-21 ENCOUNTER — HOSPITAL ENCOUNTER (OUTPATIENT)
Dept: PHYSICAL THERAPY | Age: 70
Setting detail: THERAPIES SERIES
Discharge: HOME | End: 2025-04-21
Payer: MEDICARE

## 2025-04-21 PROCEDURE — 97110 THERAPEUTIC EXERCISES: CPT | Performed by: PHYSICAL THERAPIST

## 2025-04-21 PROCEDURE — 97140 MANUAL THERAPY 1/> REGIONS: CPT | Performed by: PHYSICAL THERAPIST

## 2025-04-21 NOTE — FLOWSHEET NOTE
and/or justification of therapy services:  The patient has functional impairments and/or activity limitations and would benefit from continued outpatient therapy services to address the deficits outlined in the patients goals  The patient has a complexity identified by an ICD-10 code that has a direct and significant impact on the need for therapy.  (Significantly impacts the rate of recovery and is associated with a primary condition.)     Return to Play: Stage 1: Intro to Strength    Prognosis for POC: [x] Good [] Fair  [] Poor    Patient requires continued skilled intervention: [x] Yes  [] No      CHARGE CAPTURE     PT CHARGE GRID   CPT Code (TIMED) minutes # CPT Code (UNTIMED) #     Therex (98573)  12 1  EVAL:LOW (33264 - Typically 20 minutes face-to-face)     Neuromusc. Re-ed (59889)    Re-Eval (03571)     Manual (10022) 21 1  Estim Unattended (52871)     Ther. Act (12325)    Mech. Traction (63370)     Gait (27697)    Dry Needle 1-2 muscle (51000)     Aquatic Therex (81720)    Dry Needle 3+ muscle (20561)     Iontophoresis (26681)    VASO (57878)     Ultrasound (10770)    Group Therapy (03004)     Estim Attended (18416)    Canalith Repositioning (84676)     Physical Performance Test (85407)    Custom orthotic ()     Other:    Other:cold pack x 10 min    Total Timed Code Tx Minutes 33 2       Total Treatment Minutes 33        Charge Justification:  (12337) THERAPEUTIC EXERCISE - Provided verbal/tactile cueing for HEP and/or activities related to strengthening, flexibility, endurance, ROM performed to prevent loss of range of motion, maintain or improve muscular strength or increase flexibility, following either an injury or surgery.   (40657) MANUAL THERAPY -  Manual therapy techniques, 1 or more regions, each 15 minutes (Mobilization/manipulation, manual lymphatic drainage, manual traction) for the purpose of modulating pain, promoting relaxation,  increasing ROM, reducing/eliminating soft tissue

## 2025-06-09 ENCOUNTER — OFFICE VISIT (OUTPATIENT)
Dept: INTERNAL MEDICINE CLINIC | Age: 70
End: 2025-06-09
Payer: MEDICARE

## 2025-06-09 VITALS
HEART RATE: 80 BPM | HEIGHT: 70 IN | OXYGEN SATURATION: 95 % | WEIGHT: 215.2 LBS | SYSTOLIC BLOOD PRESSURE: 108 MMHG | DIASTOLIC BLOOD PRESSURE: 70 MMHG | BODY MASS INDEX: 30.81 KG/M2 | TEMPERATURE: 98.4 F

## 2025-06-09 DIAGNOSIS — G62.9 PERIPHERAL POLYNEUROPATHY: ICD-10-CM

## 2025-06-09 DIAGNOSIS — M76.60 ACHILLES TENDON PAIN: Primary | ICD-10-CM

## 2025-06-09 PROCEDURE — 99214 OFFICE O/P EST MOD 30 MIN: CPT | Performed by: NURSE PRACTITIONER

## 2025-06-09 PROCEDURE — 1123F ACP DISCUSS/DSCN MKR DOCD: CPT | Performed by: NURSE PRACTITIONER

## 2025-06-09 PROCEDURE — 1159F MED LIST DOCD IN RCRD: CPT | Performed by: NURSE PRACTITIONER

## 2025-06-09 RX ORDER — DULOXETIN HYDROCHLORIDE 30 MG/1
30 CAPSULE, DELAYED RELEASE ORAL DAILY
Qty: 30 CAPSULE | Refills: 0 | Status: SHIPPED | OUTPATIENT
Start: 2025-06-09

## 2025-06-09 RX ORDER — SILDENAFIL 50 MG/1
50 TABLET, FILM COATED ORAL DAILY PRN
Qty: 5 TABLET | Refills: 0 | Status: SHIPPED | OUTPATIENT
Start: 2025-06-09

## 2025-06-09 ASSESSMENT — ENCOUNTER SYMPTOMS
COUGH: 0
SORE THROAT: 0
CHEST TIGHTNESS: 0
CONSTIPATION: 0
SINUS PAIN: 0
VOMITING: 0
SHORTNESS OF BREATH: 0
DIARRHEA: 0
NAUSEA: 0

## 2025-06-09 NOTE — PROGRESS NOTES
Vinnie Driver (:  1955) is a 70 y.o. male, here for evaluation of the following chief complaint(s):  Other (R foot Achilles Tendon pain  X 2 weeks )       Assessment & Plan  1. Neuropathy.  - The neuropathy is likely due to chronic insult to the nerve roots emanating from the lumbar vertebrae, as evidenced by previous MRIs.  - It is not related to diabetes or vascular issues.  - A prescription for duloxetine will be provided to manage the neuropathic pain.  - He has been advised to avoid further strain on his feet and to ensure he wears appropriate footwear. If there is no improvement in his condition, he should contact the office for a potential referral.    2. Achilles tendinitis.  - He has been advised to reduce the duration of his elliptical workouts to 15 minutes and to incorporate Achilles stretches into his routine.  - Plantar fascial exercises have also been recommended to help alleviate heel discomfort.  - Physical examination revealed a subtle amount of swelling around the ankle.  - He should avoid any major hyperextension movements.    3. Elevated PSA.  - His PSA levels will be rechecked to ensure stability.  - Previous elevated PSA noted.    4. Elevated blood sugar.  - His blood sugar levels will be rechecked to monitor for any potential issues.  - Previous elevated blood sugar noted.    5. Erectile dysfunction.  - A refill for his erectile dysfunction medication will be provided.  - Medication sent to pharmacy.    Results  Labs   - Blood sugar: Slightly elevated   - PSA: Slightly elevated  1. Achilles tendon pain  2. Peripheral polyneuropathy    No follow-ups on file.  Subjective   History of Present Illness  The patient presents for evaluation of neuropathy and Achilles tendinitis.    He reports persistent neuropathy in his feet, which he has been unable to alleviate. Tingling and pain are present upon waking, and his feet feel unsteady until they have fully awakened. He has sought

## (undated) DEVICE — 3M™ STERI-DRAPE™ U-DRAPE, LONG 1019: Brand: STERI-DRAPE™

## (undated) DEVICE — SUTURE MONOCRYL SZ 3-0 L27IN ABSRB UD PS-2 3/8 CIR REV CUT NDL MCP427H

## (undated) DEVICE — GAUZE,SPONGE,4"X4",16PLY,STRL,LF,10/TRAY: Brand: MEDLINE

## (undated) DEVICE — SUTURE TAPE 2 40 INX1.3 MM X PAT BLK/WHT ORDER MULT OF 12 EA

## (undated) DEVICE — DYONICS 4.0 MM ELITE                                    ACROMIOBLASTER STRAIGHT DISPOSABLE                                    BURRS, SAGE GREEN, 10000 MAXIMUM                                    RPM, PACKAGED 6 PER BOX, STERILE

## (undated) DEVICE — GLOVE,SURG,SENSICARE,ALOE,LF,PF,6: Brand: MEDLINE

## (undated) DEVICE — GLOVE SURG SZ 8 L12IN FNGR THK94MIL STD WHT LTX FREE

## (undated) DEVICE — SWITCH DRAPE TENET 7633

## (undated) DEVICE — DRAPE BEACH CHAIR SHOULDER W/ POUCH 172X100 IN

## (undated) DEVICE — SOLUTION IRRIG 5L LAC R BG

## (undated) DEVICE — TOWEL,OR,DSP,ST,BLUE,STD,8/PK,10PK/CS: Brand: MEDLINE

## (undated) DEVICE — SUTURE NONABSORBABLE MONOFILAMENT 3-0 PS-1 18 IN BLK ETHILON 1663H

## (undated) DEVICE — CANNULA ARTHSCP L4CM DIA8MM PASSPRT BTTN

## (undated) DEVICE — CHLORAPREP 26ML ORANGE

## (undated) DEVICE — SUTURE PDS + SZ 0 L27IN ABSRB VLT L26MM CT 2 1 2 CIR PDP334H

## (undated) DEVICE — STERILE POLYISOPRENE POWDER-FREE SURGICAL GLOVES: Brand: PROTEXIS

## (undated) DEVICE — ELECTRODE PT RET AD L9FT HI MOIST COND ADH HYDRGEL CORDED

## (undated) DEVICE — TOWEL OR BLUEE 16X26IN ST 8 PACK ORB08 16X26ORTWL

## (undated) DEVICE — SYRINGE MED 50ML LUERLOCK TIP

## (undated) DEVICE — SHOULDER STABILIZATION KIT,                                    DISPOSABLE 12 PER BOX

## (undated) DEVICE — SUTURE SUTTAPE L40IN DIA1.3MM NONABSORBABLE WHT BLU L26.5MM AR7500

## (undated) DEVICE — 5.5 MM ELITE ACROMIOBLASTER                                    STRAIGHT DISPOSABLE BURRS, BRICK                                    RED, 10000 MAXIMUM RPM, PACKAGED 6                                    PER BOX, STERILE

## (undated) DEVICE — Device

## (undated) DEVICE — SUTURE FIBERWIRE SZ 2 W/ TAPERED NEEDLE BLUE L38IN NONABSORB BLU L26.5MM 1/2 CIRCLE AR7200

## (undated) DEVICE — 4.5 MM FULL RADIUS STRAIGHT                                    BLADES, POWER/EP-1, YELLOW, PACKAGED                                    6 PER BOX, STERILE: Brand: DYONICS

## (undated) DEVICE — 3M™ IOBAN™ 2 ANTIMICROBIAL INCISE DRAPE 6650EZ: Brand: IOBAN™ 2

## (undated) DEVICE — SUTURE MONOCRYL STRATAFIX SPRL SZ 3-0 L12IN ABSRB UD FS-1 L30X30CM SXMP2B410

## (undated) DEVICE — BLADE ES ELASTOMERIC COAT INSUL DURABLE BEND UPTO 90DEG

## (undated) DEVICE — APPLICATOR MEDICATED 10.5 CC SOLUTION HI LT ORNG CHLORAPREP

## (undated) DEVICE — PENCIL SMK EVAC ALL IN 1 DSGN ENH VISIBILITY IMPROVED AIR

## (undated) DEVICE — SYRINGE MED 10ML LUERLOCK TIP W/O SFTY DISP

## (undated) DEVICE — 4.0MM PLATINUM FLYER BLADE: Brand: DYONICS

## (undated) DEVICE — GOWN,SIRUS,NON REINFRCD,LARGE,SET IN SL: Brand: MEDLINE

## (undated) DEVICE — NEEDLE SUT PASS FOR ROT CUF LABRAL REP MULTFI SCORPION

## (undated) DEVICE — DRAPE,U/ SHT,SPLIT,PLAS,STERIL: Brand: MEDLINE

## (undated) DEVICE — UNIVERSAL BLOCK TRAY: Brand: MEDLINE INDUSTRIES, INC.

## (undated) DEVICE — NEEDLE HYPO 18GA L1.5IN PNK POLYPR HUB S STL REG BVL STR

## (undated) DEVICE — GLOVE SURG SZ 8 L12IN FNGR THK79MIL GRN LTX FREE

## (undated) DEVICE — GLOVE SURG SZ 7 L12IN FNGR THK79MIL GRN LTX FREE

## (undated) DEVICE — TUBE IRRIG L8IN LNG PT W/ CONN FOR PMP SYS REDEUCE

## (undated) DEVICE — GLOVE SURG SZ 65 L12IN FNGR THK94MIL STD WHT LTX FREE

## (undated) DEVICE — BLADE,STAINLESS-STEEL,15,STRL,DISPOSABLE: Brand: MEDLINE

## (undated) DEVICE — 3M™ STERI-STRIP™ REINFORCED ADHESIVE SKIN CLOSURES, R1547, 1/2 IN X 4 IN (12 MM X 100 MM), 6 STRIPS/ENVELOPE: Brand: 3M™ STERI-STRIP™

## (undated) DEVICE — GLOVE ORANGE PI 7   MSG9070

## (undated) DEVICE — 40763 BEACH CHAIR HEAD RESTRAINT: Brand: 40763 BEACH CHAIR HEAD RESTRAINT

## (undated) DEVICE — WEREWOLF FLOW 90 COBLATION WAND: Brand: COBLATION

## (undated) DEVICE — ALCOHOL RUBBING 16OZ 70% ISO

## (undated) DEVICE — DRESSING,GAUZE,XEROFORM,CURAD,1"X8",ST: Brand: CURAD

## (undated) DEVICE — PAD,ABDOMINAL,8"X10",ST,LF: Brand: MEDLINE

## (undated) DEVICE — ABLATOR ENDOSCOPIC ELECTROCAUTERY 90 DEG RF ASPIRATING STERILE DISPOSABLE APOLLORF I90